# Patient Record
Sex: MALE | Race: WHITE | NOT HISPANIC OR LATINO
[De-identification: names, ages, dates, MRNs, and addresses within clinical notes are randomized per-mention and may not be internally consistent; named-entity substitution may affect disease eponyms.]

---

## 2020-02-28 ENCOUNTER — APPOINTMENT (OUTPATIENT)
Dept: NEUROLOGY | Facility: CLINIC | Age: 70
End: 2020-02-28
Payer: MEDICARE

## 2020-02-28 VITALS
SYSTOLIC BLOOD PRESSURE: 115 MMHG | DIASTOLIC BLOOD PRESSURE: 75 MMHG | BODY MASS INDEX: 35 KG/M2 | HEIGHT: 71 IN | HEART RATE: 81 BPM | WEIGHT: 250 LBS

## 2020-02-28 DIAGNOSIS — Z86.718 PERSONAL HISTORY OF OTHER VENOUS THROMBOSIS AND EMBOLISM: ICD-10-CM

## 2020-02-28 DIAGNOSIS — E11.9 TYPE 2 DIABETES MELLITUS W/OUT COMPLICATIONS: ICD-10-CM

## 2020-02-28 DIAGNOSIS — Z86.711 PERSONAL HISTORY OF PULMONARY EMBOLISM: ICD-10-CM

## 2020-02-28 DIAGNOSIS — E23.2 DIABETES INSIPIDUS: ICD-10-CM

## 2020-02-28 DIAGNOSIS — E03.9 HYPOTHYROIDISM, UNSPECIFIED: ICD-10-CM

## 2020-02-28 DIAGNOSIS — G47.30 SLEEP APNEA, UNSPECIFIED: ICD-10-CM

## 2020-02-28 DIAGNOSIS — Z78.9 OTHER SPECIFIED HEALTH STATUS: ICD-10-CM

## 2020-02-28 DIAGNOSIS — Z80.1 FAMILY HISTORY OF MALIGNANT NEOPLASM OF TRACHEA, BRONCHUS AND LUNG: ICD-10-CM

## 2020-02-28 DIAGNOSIS — E78.5 HYPERLIPIDEMIA, UNSPECIFIED: ICD-10-CM

## 2020-02-28 PROCEDURE — 99204 OFFICE O/P NEW MOD 45 MIN: CPT

## 2020-02-28 RX ORDER — CHLORHEXIDINE GLUCONATE 4 %
325 (65 FE) LIQUID (ML) TOPICAL
Refills: 0 | Status: ACTIVE | COMMUNITY

## 2020-02-28 RX ORDER — ASPIRIN 81 MG
81 TABLET, DELAYED RELEASE (ENTERIC COATED) ORAL
Refills: 0 | Status: ACTIVE | COMMUNITY

## 2020-02-28 RX ORDER — CHROMIUM 200 MCG
TABLET ORAL
Refills: 0 | Status: ACTIVE | COMMUNITY

## 2020-02-28 RX ORDER — TAMSULOSIN HYDROCHLORIDE 0.4 MG/1
0.4 CAPSULE ORAL
Refills: 0 | Status: ACTIVE | COMMUNITY

## 2020-02-28 RX ORDER — HYDROCORTISONE 5 MG/1
TABLET ORAL
Refills: 0 | Status: ACTIVE | COMMUNITY

## 2020-02-28 RX ORDER — DULAGLUTIDE 4.5 MG/.5ML
INJECTION, SOLUTION SUBCUTANEOUS
Refills: 0 | Status: ACTIVE | COMMUNITY

## 2020-02-28 RX ORDER — TESTOSTERONE 50 MG/5G
GEL TRANSDERMAL
Refills: 0 | Status: ACTIVE | COMMUNITY

## 2020-02-28 RX ORDER — ASCORBIC ACID 500 MG
TABLET ORAL
Refills: 0 | Status: ACTIVE | COMMUNITY

## 2020-02-28 RX ORDER — DESMOPRESSIN ACETATE 0.1 MG/ML
SPRAY NASAL
Refills: 0 | Status: ACTIVE | COMMUNITY

## 2020-02-28 RX ORDER — VENLAFAXINE HYDROCHLORIDE 150 MG/1
150 TABLET, EXTENDED RELEASE ORAL
Refills: 0 | Status: ACTIVE | COMMUNITY

## 2020-02-28 RX ORDER — B2/MAGNESIUM CIT,OXID/FEVERFEW 200-180-50
200-180-50 TABLET ORAL
Qty: 90 | Refills: 1 | Status: ACTIVE | COMMUNITY
Start: 2020-02-28 | End: 1900-01-01

## 2020-02-28 RX ORDER — RIVAROXABAN 20 MG/1
20 TABLET, FILM COATED ORAL
Refills: 0 | Status: ACTIVE | COMMUNITY

## 2020-02-28 RX ORDER — METFORMIN HYDROCHLORIDE 1000 MG/1
1000 TABLET, COATED ORAL
Refills: 0 | Status: ACTIVE | COMMUNITY

## 2020-02-28 RX ORDER — PANTOPRAZOLE 40 MG/1
40 TABLET, DELAYED RELEASE ORAL
Refills: 0 | Status: ACTIVE | COMMUNITY

## 2020-02-28 RX ORDER — ATORVASTATIN CALCIUM 40 MG/1
40 TABLET, FILM COATED ORAL
Refills: 0 | Status: ACTIVE | COMMUNITY

## 2020-02-28 NOTE — DATA REVIEWED
[de-identified] : 2/10/20: CT head: Status post right craniotomy with underlying due to of thickening without significant change from prior study, no evidence of acute intracranial hemorrhage

## 2020-02-28 NOTE — PHYSICAL EXAM
[General Appearance - Alert] : alert [General Appearance - In No Acute Distress] : in no acute distress [Oriented To Time, Place, And Person] : oriented to person, place, and time [Impaired Insight] : insight and judgment were intact [Affect] : the affect was normal [Person] : oriented to person [Place] : oriented to place [Time] : oriented to time [Concentration Intact] : normal concentrating ability [Visual Intact] : visual attention was ~T not ~L decreased [Repeating Phrases] : no difficulty repeating a phrase [Writing A Sentence] : no difficulty writing a sentence [Naming Objects] : no difficulty naming common objects [Fluency] : fluency intact [Comprehension] : comprehension intact [Past History] : adequate knowledge of personal past history [Reading] : reading intact [Cranial Nerves Optic (II)] : visual acuity intact bilaterally,  visual fields full to confrontation, pupils equal round and reactive to light [Cranial Nerves Oculomotor (III)] : extraocular motion intact [Cranial Nerves Trigeminal (V)] : facial sensation intact symmetrically [Cranial Nerves Facial (VII)] : face symmetrical [Cranial Nerves Vestibulocochlear (VIII)] : hearing was intact bilaterally [Cranial Nerves Hypoglossal (XII)] : there was no tongue deviation with protrusion [Cranial Nerves Accessory (XI - Cranial And Spinal)] : head turning and shoulder shrug symmetric [Cranial Nerves Glossopharyngeal (IX)] : tongue and palate midline [Motor Tone] : muscle tone was normal in all four extremities [Motor Strength] : muscle strength was normal in all four extremities [Proprioception] : proprioception was intact [No Muscle Atrophy] : normal bulk in all four extremities [Sensation Tactile Decrease] : light touch was intact [Vibration Decrease Leg / Foot Both Ankles] : decreased at both ankles [Vibration Decrease Leg / Foot Toes Both Feet] : decreased at the toes of both feet  [Abnormal Walk] : normal gait [Limited Balance] : the patient's balance was impaired [Tremor] : a tremor present [2+] : Patella left 2+ [1+] : Ankle jerk left 1+ [PERRL With Normal Accommodation] : pupils were equal in size, round, reactive to light, with normal accommodation [Extraocular Movements] : extraocular movements were intact [Full Visual Field] : full visual field [Neck Cervical Mass (___cm)] : no neck mass was observed [Auscultation Breath Sounds / Voice Sounds] : lungs were clear to auscultation bilaterally [Heart Rate And Rhythm] : heart rate was normal and rhythm regular [Heart Sounds] : normal S1 and S2 [No Spinal Tenderness] : no spinal tenderness [Musculoskeletal - Swelling] : no joint swelling seen [] : no rash [Short Term Intact] : short term memory impaired [Past-pointing] : there was no past-pointing [Dysdiadochokinesia Bilaterally] : not present [Coordination - Dysmetria Impaired Finger-to-Nose Bilateral] : not present [Plantar Reflex Right Only] : normal on the right [Plantar Reflex Left Only] : normal on the left [Papilledema Of Both Eyes] : no papilledema

## 2020-02-28 NOTE — HISTORY OF PRESENT ILLNESS
[FreeTextEntry1] : 69 year old RH male with PMHx of DM, HLD, hypothyroidism, DVT/PE, Craniopharyngioma. status-post craniotomy with resection in 2014, followed by recurrence with TS resection in 2015, follows up with neurosurgeon periodically at [facility] in [facility], has had number of hospital admission over 5 years for \par DI / metabolic syndrome; his last admission was in early Feb 2020 for PNA/Bronchitis. \par \par Patient started having headaches with visual blurring prior to diagnosis of Craniopharyngioma, after resection, he noted resolution of visual blurring but headaches persisted, he had had almost daily headaches since past 5 years, the headaches are frontal or temporal in location, usually left sided, lately right sided, moderate in intensity, occasionally severe, not associated with N, V, diplopia, tinnitus, vertigo, ataxia, paresthesias; occasionally associated with dizziness or visual blurring. Pt was evaluated and f/u with Dr. Padilla, the neurologist, has been on Topiramate 50 mg bid, he has not followed up with him in more than a year.

## 2020-02-28 NOTE — PHYSICAL EXAM
[General Appearance - Alert] : alert [General Appearance - In No Acute Distress] : in no acute distress [Oriented To Time, Place, And Person] : oriented to person, place, and time [Affect] : the affect was normal [Impaired Insight] : insight and judgment were intact [Person] : oriented to person [Place] : oriented to place [Time] : oriented to time [Concentration Intact] : normal concentrating ability [Visual Intact] : visual attention was ~T not ~L decreased [Writing A Sentence] : no difficulty writing a sentence [Naming Objects] : no difficulty naming common objects [Repeating Phrases] : no difficulty repeating a phrase [Comprehension] : comprehension intact [Fluency] : fluency intact [Reading] : reading intact [Past History] : adequate knowledge of personal past history [Cranial Nerves Optic (II)] : visual acuity intact bilaterally,  visual fields full to confrontation, pupils equal round and reactive to light [Cranial Nerves Oculomotor (III)] : extraocular motion intact [Cranial Nerves Trigeminal (V)] : facial sensation intact symmetrically [Cranial Nerves Vestibulocochlear (VIII)] : hearing was intact bilaterally [Cranial Nerves Facial (VII)] : face symmetrical [Cranial Nerves Hypoglossal (XII)] : there was no tongue deviation with protrusion [Cranial Nerves Glossopharyngeal (IX)] : tongue and palate midline [Cranial Nerves Accessory (XI - Cranial And Spinal)] : head turning and shoulder shrug symmetric [Motor Tone] : muscle tone was normal in all four extremities [Motor Strength] : muscle strength was normal in all four extremities [No Muscle Atrophy] : normal bulk in all four extremities [Proprioception] : proprioception was intact [Sensation Tactile Decrease] : light touch was intact [Vibration Decrease Leg / Foot Both Ankles] : decreased at both ankles [Vibration Decrease Leg / Foot Toes Both Feet] : decreased at the toes of both feet  [Abnormal Walk] : normal gait [Limited Balance] : the patient's balance was impaired [Tremor] : a tremor present [2+] : Patella left 2+ [1+] : Ankle jerk left 1+ [PERRL With Normal Accommodation] : pupils were equal in size, round, reactive to light, with normal accommodation [Extraocular Movements] : extraocular movements were intact [Full Visual Field] : full visual field [Neck Cervical Mass (___cm)] : no neck mass was observed [Auscultation Breath Sounds / Voice Sounds] : lungs were clear to auscultation bilaterally [Heart Rate And Rhythm] : heart rate was normal and rhythm regular [Heart Sounds] : normal S1 and S2 [No Spinal Tenderness] : no spinal tenderness [Musculoskeletal - Swelling] : no joint swelling seen [] : no rash [Past-pointing] : there was no past-pointing [Short Term Intact] : short term memory impaired [Dysdiadochokinesia Bilaterally] : not present [Coordination - Dysmetria Impaired Finger-to-Nose Bilateral] : not present [Plantar Reflex Right Only] : normal on the right [Plantar Reflex Left Only] : normal on the left [Papilledema Of Both Eyes] : no papilledema

## 2020-02-28 NOTE — DATA REVIEWED
[de-identified] : 2/10/20: CT head: Status post right craniotomy with underlying due to of thickening without significant change from prior study, no evidence of acute intracranial hemorrhage

## 2020-02-28 NOTE — HISTORY OF PRESENT ILLNESS
[FreeTextEntry1] : 69 year old RH male with PMHx of DM, HLD, hypothyroidism, DVT/PE, Craniopharyngioma. status-post craniotomy with resection in 2014, followed by recurrence with TS resection in 2015, follows up with neurosurgeon periodically at [facility] in [facility], has had number of hospital admission over 5 years for \par DI / metabolic syndrome; his last admission was in early Feb 2020 for PNA/Bronchitis. \par \par Patient started having headaches with visual blurring prior to diagnosis of Craniopharyngioma, after resection, he noted resolution of visual blurring but headaches persisted, he had had almost daily headaches since past 5 years, the headaches are frontal or temporal in location, usually left sided, lately right sided, moderate in intensity, occasionally severe, not associated with N, V, diplopia, tinnitus, vertigo, ataxia, paresthesias; occasionally associated with dizziness or visual blurring. Pt was evaluated and f/u with Dr. Padilla, the neurologist, has been on Topiramate 50 mg bid, he has not followed up with him in more than a year.

## 2020-02-28 NOTE — REVIEW OF SYSTEMS
[Feeling Tired] : feeling tired [Sleep Disturbances] : sleep disturbances [As Noted in HPI] : as noted in HPI [Memory Lapses or Loss] : memory loss [Decr. Concentrating Ability] : decreased concentrating ability [Poor Coordination] : poor coordination [Tension Headache] : tension-type headaches [Negative] : Heme/Lymph [FreeTextEntry7] : nausea

## 2020-02-28 NOTE — REASON FOR VISIT
[Consultation] : a consultation visit [Spouse] : spouse [FreeTextEntry1] : for evaluation of daily headaches

## 2020-02-28 NOTE — REVIEW OF SYSTEMS
[Feeling Tired] : feeling tired [As Noted in HPI] : as noted in HPI [Sleep Disturbances] : sleep disturbances [Memory Lapses or Loss] : memory loss [Decr. Concentrating Ability] : decreased concentrating ability [Poor Coordination] : poor coordination [Tension Headache] : tension-type headaches [Negative] : Heme/Lymph [FreeTextEntry7] : nausea

## 2020-02-28 NOTE — DISCUSSION/SUMMARY
[FreeTextEntry1] : 69 year old male with PMHx of DM, DI, HLD, DVT/PE, Craniopharyngioma. status-post crani with resection in 2014 and 2015; has had headaches with visual blurring prior to diagnosis of Craniopharyngioma, after resection, he noted resolution of visual blurring, the headaches however persisted, he has almost daily headaches since past 5 years, has been on Topiramate 50 mg bid.\par \par # Cephalgia; chronic daily Headaches, no signs of raised ICP\par \par - Have recommended continuing Topiramate 50 mg bid\par - Start Migrelief 1 capsule daily\par - Will consider changing prophylactic medication if no improvement with Topiramate in 10-12 weeks\par - F/U MRI brain results scheduled with NS

## 2020-05-22 ENCOUNTER — APPOINTMENT (OUTPATIENT)
Dept: NEUROLOGY | Facility: CLINIC | Age: 70
End: 2020-05-22
Payer: MEDICARE

## 2020-05-22 PROCEDURE — 99213 OFFICE O/P EST LOW 20 MIN: CPT | Mod: 95

## 2020-05-22 NOTE — HISTORY OF PRESENT ILLNESS
[Home] : at home, [unfilled] , at the time of the visit. [Medical Office: ([facility])___] : at the medical office located in  [Verbal consent obtained from patient] : the patient, [unfilled] [FreeTextEntry1] : Obtained consent for telehealth followup visit from the patient. Patient was last seen on 2/28/20.\par \par Patient reports he has noted remarkable improvement of daily headaches after starting Migrelief one capsule daily, in addition, he takes Topiramate 50 mg b.i.d. he reports in the last 2 months he has had only few headaches per month, these were mild. \par \par Patient denies visual blurring, worsening gait or balance, nausea, vomiting or sleep disturbunce. He is scheduled to followup with his neurosurgeon and followup MRI for craniopharyngioma in August 2020.\par \par Patient has no other complaints.

## 2020-05-22 NOTE — REVIEW OF SYSTEMS
[As Noted in HPI] : as noted in HPI [Memory Lapses or Loss] : memory loss [Decr. Concentrating Ability] : decreased concentrating ability [Poor Coordination] : poor coordination [Tension Headache] : tension-type headaches [Negative] : Heme/Lymph

## 2020-05-22 NOTE — DATA REVIEWED
[de-identified] : 2/10/20: CT head: Status post right craniotomy with underlying due to of thickening without significant change from prior study, no evidence of acute intracranial hemorrhage

## 2020-05-22 NOTE — DISCUSSION/SUMMARY
[FreeTextEntry1] : 70 year old male with PMHx of DM, DI, HLD, DVT/PE, Craniopharyngioma. status-post crani with resection in 2014 and 2015; has had headaches with visual blurring prior to diagnosis of Craniopharyngioma, after resection, he noted resolution of visual blurring, the headaches however persisted, he has almost daily headaches since past 5 years, has been on Topiramate 50 mg bid.\par \par # Cephalgia; chronic daily Headaches, remarkable improvement of HA from daily to few / month\par \par - continue Topiramate 50 mg bid\par - continue Migrelief 1 capsule daily\par - F/U with MRI brain results scheduled with NS in 8/2020

## 2020-05-22 NOTE — PHYSICAL EXAM
[General Appearance - Well-Appearing] : healthy appearing [General Appearance - Alert] : alert [Mood] : the mood was normal [Place] : oriented to place [Person] : oriented to person [Time] : oriented to time [Registration Intact] : recent registration memory intact [Repeating Phrases] : no difficulty repeating a phrase [Naming Objects] : no difficulty naming common objects [Fluency] : fluency intact [Comprehension] : comprehension intact [Cranial Nerves Optic (II)] : visual acuity intact bilaterally,  visual fields full to confrontation, pupils equal round and reactive to light [Cranial Nerves Oculomotor (III)] : extraocular motion intact [Cranial Nerves Hypoglossal (XII)] : there was no tongue deviation with protrusion [Cranial Nerves Facial (VII)] : face symmetrical [Short Term Intact] : short term memory impaired [FreeTextEntry6] : Limited virtual motor examination: no pronator drift, moves all 4 extremities antigravity, no finger-nose-finger ataxia [FreeTextEntry8] : Difficulty walking on heels and toes,

## 2020-11-27 ENCOUNTER — RX RENEWAL (OUTPATIENT)
Age: 70
End: 2020-11-27

## 2021-01-20 ENCOUNTER — INPATIENT (INPATIENT)
Facility: HOSPITAL | Age: 71
LOS: 3 days | Discharge: HOME HEALTH | DRG: 53 | End: 2021-01-24
Attending: FAMILY MEDICINE | Admitting: INTERNAL MEDICINE
Payer: COMMERCIAL

## 2021-01-20 VITALS
TEMPERATURE: 98 F | HEART RATE: 84 BPM | SYSTOLIC BLOOD PRESSURE: 131 MMHG | OXYGEN SATURATION: 99 % | WEIGHT: 268.08 LBS | RESPIRATION RATE: 16 BRPM | HEIGHT: 71 IN | DIASTOLIC BLOOD PRESSURE: 74 MMHG

## 2021-01-20 DIAGNOSIS — E23.2 DIABETES INSIPIDUS: ICD-10-CM

## 2021-01-20 DIAGNOSIS — E11.65 TYPE 2 DIABETES MELLITUS WITH HYPERGLYCEMIA: ICD-10-CM

## 2021-01-20 DIAGNOSIS — Z20.822 CONTACT WITH AND (SUSPECTED) EXPOSURE TO COVID-19: ICD-10-CM

## 2021-01-20 DIAGNOSIS — R55 SYNCOPE AND COLLAPSE: ICD-10-CM

## 2021-01-20 LAB
ADD ON TEST-SPECIMEN IN LAB: SIGNIFICANT CHANGE UP
ALBUMIN SERPL ELPH-MCNC: 3.3 G/DL — SIGNIFICANT CHANGE UP (ref 3.3–5)
ALP SERPL-CCNC: 99 U/L — SIGNIFICANT CHANGE UP (ref 40–120)
ALT FLD-CCNC: 76 U/L — SIGNIFICANT CHANGE UP (ref 12–78)
ANION GAP SERPL CALC-SCNC: 7 MMOL/L — SIGNIFICANT CHANGE UP (ref 5–17)
APPEARANCE UR: CLEAR — SIGNIFICANT CHANGE UP
APTT BLD: 41.4 SEC — HIGH (ref 27.5–35.5)
AST SERPL-CCNC: 29 U/L — SIGNIFICANT CHANGE UP (ref 15–37)
BASE EXCESS BLDV CALC-SCNC: -4.4 MMOL/L — LOW (ref -2–2)
BASOPHILS # BLD AUTO: 0.04 K/UL — SIGNIFICANT CHANGE UP (ref 0–0.2)
BASOPHILS NFR BLD AUTO: 0.7 % — SIGNIFICANT CHANGE UP (ref 0–2)
BILIRUB SERPL-MCNC: 0.4 MG/DL — SIGNIFICANT CHANGE UP (ref 0.2–1.2)
BILIRUB UR-MCNC: NEGATIVE — SIGNIFICANT CHANGE UP
BUN SERPL-MCNC: 19 MG/DL — SIGNIFICANT CHANGE UP (ref 7–23)
CALCIUM SERPL-MCNC: 8.7 MG/DL — SIGNIFICANT CHANGE UP (ref 8.5–10.1)
CHLORIDE SERPL-SCNC: 106 MMOL/L — SIGNIFICANT CHANGE UP (ref 96–108)
CO2 SERPL-SCNC: 24 MMOL/L — SIGNIFICANT CHANGE UP (ref 22–31)
COLOR SPEC: YELLOW — SIGNIFICANT CHANGE UP
CREAT SERPL-MCNC: 1.5 MG/DL — HIGH (ref 0.5–1.3)
DIFF PNL FLD: NEGATIVE — SIGNIFICANT CHANGE UP
EOSINOPHIL # BLD AUTO: 0.16 K/UL — SIGNIFICANT CHANGE UP (ref 0–0.5)
EOSINOPHIL NFR BLD AUTO: 2.9 % — SIGNIFICANT CHANGE UP (ref 0–6)
ETHANOL SERPL-MCNC: <10 MG/DL — SIGNIFICANT CHANGE UP (ref 0–10)
GLUCOSE SERPL-MCNC: 331 MG/DL — HIGH (ref 70–99)
GLUCOSE UR QL: 1000 MG/DL
HCO3 BLDV-SCNC: 23 MMOL/L — SIGNIFICANT CHANGE UP (ref 21–29)
HCT VFR BLD CALC: 42.2 % — SIGNIFICANT CHANGE UP (ref 39–50)
HGB BLD-MCNC: 14.4 G/DL — SIGNIFICANT CHANGE UP (ref 13–17)
IMM GRANULOCYTES NFR BLD AUTO: 1.5 % — SIGNIFICANT CHANGE UP (ref 0–1.5)
INR BLD: 1.04 RATIO — SIGNIFICANT CHANGE UP (ref 0.88–1.16)
KETONES UR-MCNC: ABNORMAL
LACTATE SERPL-SCNC: 3.5 MMOL/L — HIGH (ref 0.7–2)
LEUKOCYTE ESTERASE UR-ACNC: NEGATIVE — SIGNIFICANT CHANGE UP
LIDOCAIN IGE QN: 147 U/L — SIGNIFICANT CHANGE UP (ref 73–393)
LYMPHOCYTES # BLD AUTO: 1.49 K/UL — SIGNIFICANT CHANGE UP (ref 1–3.3)
LYMPHOCYTES # BLD AUTO: 27.1 % — SIGNIFICANT CHANGE UP (ref 13–44)
MCHC RBC-ENTMCNC: 33 PG — SIGNIFICANT CHANGE UP (ref 27–34)
MCHC RBC-ENTMCNC: 34.1 GM/DL — SIGNIFICANT CHANGE UP (ref 32–36)
MCV RBC AUTO: 96.6 FL — SIGNIFICANT CHANGE UP (ref 80–100)
MONOCYTES # BLD AUTO: 0.45 K/UL — SIGNIFICANT CHANGE UP (ref 0–0.9)
MONOCYTES NFR BLD AUTO: 8.2 % — SIGNIFICANT CHANGE UP (ref 2–14)
NEUTROPHILS # BLD AUTO: 3.28 K/UL — SIGNIFICANT CHANGE UP (ref 1.8–7.4)
NEUTROPHILS NFR BLD AUTO: 59.6 % — SIGNIFICANT CHANGE UP (ref 43–77)
NITRITE UR-MCNC: NEGATIVE — SIGNIFICANT CHANGE UP
PCO2 BLDV: 51 MMHG — HIGH (ref 35–50)
PH BLDV: 7.27 — LOW (ref 7.35–7.45)
PH UR: 6 — SIGNIFICANT CHANGE UP (ref 5–8)
PLATELET # BLD AUTO: 156 K/UL — SIGNIFICANT CHANGE UP (ref 150–400)
PO2 BLDV: 38 MMHG — SIGNIFICANT CHANGE UP (ref 25–45)
POTASSIUM SERPL-MCNC: 4.8 MMOL/L — SIGNIFICANT CHANGE UP (ref 3.5–5.3)
POTASSIUM SERPL-SCNC: 4.8 MMOL/L — SIGNIFICANT CHANGE UP (ref 3.5–5.3)
PROT SERPL-MCNC: 6.7 GM/DL — SIGNIFICANT CHANGE UP (ref 6–8.3)
PROT UR-MCNC: 15 MG/DL
PROTHROM AB SERPL-ACNC: 12 SEC — SIGNIFICANT CHANGE UP (ref 10.6–13.6)
RBC # BLD: 4.37 M/UL — SIGNIFICANT CHANGE UP (ref 4.2–5.8)
RBC # FLD: 13 % — SIGNIFICANT CHANGE UP (ref 10.3–14.5)
SAO2 % BLDV: 65 % — LOW (ref 67–88)
SARS-COV-2 RNA SPEC QL NAA+PROBE: SIGNIFICANT CHANGE UP
SODIUM SERPL-SCNC: 137 MMOL/L — SIGNIFICANT CHANGE UP (ref 135–145)
SP GR SPEC: 1.01 — SIGNIFICANT CHANGE UP (ref 1.01–1.02)
TROPONIN I SERPL-MCNC: <0.015 NG/ML — SIGNIFICANT CHANGE UP (ref 0.01–0.04)
TROPONIN I SERPL-MCNC: <0.015 NG/ML — SIGNIFICANT CHANGE UP (ref 0.01–0.04)
UROBILINOGEN FLD QL: NEGATIVE MG/DL — SIGNIFICANT CHANGE UP
WBC # BLD: 5.5 K/UL — SIGNIFICANT CHANGE UP (ref 3.8–10.5)
WBC # FLD AUTO: 5.5 K/UL — SIGNIFICANT CHANGE UP (ref 3.8–10.5)

## 2021-01-20 PROCEDURE — 70450 CT HEAD/BRAIN W/O DYE: CPT | Mod: 26,77

## 2021-01-20 PROCEDURE — 83605 ASSAY OF LACTIC ACID: CPT

## 2021-01-20 PROCEDURE — 86803 HEPATITIS C AB TEST: CPT

## 2021-01-20 PROCEDURE — 82962 GLUCOSE BLOOD TEST: CPT

## 2021-01-20 PROCEDURE — 97116 GAIT TRAINING THERAPY: CPT | Mod: GP

## 2021-01-20 PROCEDURE — 36415 COLL VENOUS BLD VENIPUNCTURE: CPT

## 2021-01-20 PROCEDURE — 84484 ASSAY OF TROPONIN QUANT: CPT

## 2021-01-20 PROCEDURE — 83036 HEMOGLOBIN GLYCOSYLATED A1C: CPT

## 2021-01-20 PROCEDURE — 70450 CT HEAD/BRAIN W/O DYE: CPT | Mod: 26

## 2021-01-20 PROCEDURE — 99242 OFF/OP CONSLTJ NEW/EST SF 20: CPT

## 2021-01-20 PROCEDURE — 97162 PT EVAL MOD COMPLEX 30 MIN: CPT | Mod: GP

## 2021-01-20 PROCEDURE — 93306 TTE W/DOPPLER COMPLETE: CPT

## 2021-01-20 PROCEDURE — 95700 EEG CONT REC W/VID EEG TECH: CPT

## 2021-01-20 PROCEDURE — 99223 1ST HOSP IP/OBS HIGH 75: CPT

## 2021-01-20 PROCEDURE — 73610 X-RAY EXAM OF ANKLE: CPT | Mod: 26,LT

## 2021-01-20 PROCEDURE — 80048 BASIC METABOLIC PNL TOTAL CA: CPT

## 2021-01-20 PROCEDURE — 93010 ELECTROCARDIOGRAM REPORT: CPT

## 2021-01-20 PROCEDURE — 84439 ASSAY OF FREE THYROXINE: CPT

## 2021-01-20 PROCEDURE — 97530 THERAPEUTIC ACTIVITIES: CPT | Mod: GP

## 2021-01-20 PROCEDURE — 76376 3D RENDER W/INTRP POSTPROCES: CPT | Mod: 26

## 2021-01-20 PROCEDURE — 86769 SARS-COV-2 COVID-19 ANTIBODY: CPT

## 2021-01-20 PROCEDURE — 71260 CT THORAX DX C+: CPT | Mod: 26

## 2021-01-20 PROCEDURE — 72125 CT NECK SPINE W/O DYE: CPT | Mod: 26

## 2021-01-20 PROCEDURE — 73562 X-RAY EXAM OF KNEE 3: CPT | Mod: 26,LT

## 2021-01-20 PROCEDURE — 74177 CT ABD & PELVIS W/CONTRAST: CPT | Mod: 26

## 2021-01-20 PROCEDURE — 85027 COMPLETE CBC AUTOMATED: CPT

## 2021-01-20 PROCEDURE — 70486 CT MAXILLOFACIAL W/O DYE: CPT | Mod: 26

## 2021-01-20 PROCEDURE — 84480 ASSAY TRIIODOTHYRONINE (T3): CPT

## 2021-01-20 PROCEDURE — 84443 ASSAY THYROID STIM HORMONE: CPT

## 2021-01-20 PROCEDURE — 73630 X-RAY EXAM OF FOOT: CPT | Mod: LT

## 2021-01-20 PROCEDURE — 95714 VEEG EA 12-26 HR UNMNTR: CPT

## 2021-01-20 PROCEDURE — 70450 CT HEAD/BRAIN W/O DYE: CPT

## 2021-01-20 RX ORDER — INSULIN GLARGINE 100 [IU]/ML
10 INJECTION, SOLUTION SUBCUTANEOUS AT BEDTIME
Refills: 0 | Status: DISCONTINUED | OUTPATIENT
Start: 2021-01-20 | End: 2021-01-23

## 2021-01-20 RX ORDER — METOCLOPRAMIDE HCL 10 MG
10 TABLET ORAL DAILY
Refills: 0 | Status: DISCONTINUED | OUTPATIENT
Start: 2021-01-20 | End: 2021-01-24

## 2021-01-20 RX ORDER — DEXTROSE 50 % IN WATER 50 %
15 SYRINGE (ML) INTRAVENOUS ONCE
Refills: 0 | Status: DISCONTINUED | OUTPATIENT
Start: 2021-01-20 | End: 2021-01-24

## 2021-01-20 RX ORDER — DESMOPRESSIN ACETATE 0.1 MG/1
1 TABLET ORAL
Qty: 0 | Refills: 0 | DISCHARGE

## 2021-01-20 RX ORDER — ATORVASTATIN CALCIUM 80 MG/1
40 TABLET, FILM COATED ORAL AT BEDTIME
Refills: 0 | Status: DISCONTINUED | OUTPATIENT
Start: 2021-01-20 | End: 2021-01-24

## 2021-01-20 RX ORDER — SODIUM CHLORIDE 9 MG/ML
1000 INJECTION, SOLUTION INTRAVENOUS
Refills: 0 | Status: DISCONTINUED | OUTPATIENT
Start: 2021-01-20 | End: 2021-01-24

## 2021-01-20 RX ORDER — DEXTROSE 50 % IN WATER 50 %
25 SYRINGE (ML) INTRAVENOUS ONCE
Refills: 0 | Status: DISCONTINUED | OUTPATIENT
Start: 2021-01-20 | End: 2021-01-24

## 2021-01-20 RX ORDER — TETANUS TOXOID, REDUCED DIPHTHERIA TOXOID AND ACELLULAR PERTUSSIS VACCINE, ADSORBED 5; 2.5; 8; 8; 2.5 [IU]/.5ML; [IU]/.5ML; UG/.5ML; UG/.5ML; UG/.5ML
0.5 SUSPENSION INTRAMUSCULAR ONCE
Refills: 0 | Status: COMPLETED | OUTPATIENT
Start: 2021-01-20 | End: 2021-01-20

## 2021-01-20 RX ORDER — SODIUM CHLORIDE 9 MG/ML
2000 INJECTION INTRAMUSCULAR; INTRAVENOUS; SUBCUTANEOUS ONCE
Refills: 0 | Status: COMPLETED | OUTPATIENT
Start: 2021-01-20 | End: 2021-01-20

## 2021-01-20 RX ORDER — DEXTROSE 50 % IN WATER 50 %
12.5 SYRINGE (ML) INTRAVENOUS ONCE
Refills: 0 | Status: DISCONTINUED | OUTPATIENT
Start: 2021-01-20 | End: 2021-01-24

## 2021-01-20 RX ORDER — RIVAROXABAN 15 MG-20MG
10 KIT ORAL
Refills: 0 | Status: DISCONTINUED | OUTPATIENT
Start: 2021-01-20 | End: 2021-01-24

## 2021-01-20 RX ORDER — DESMOPRESSIN ACETATE 0.1 MG/1
0.1 TABLET ORAL DAILY
Refills: 0 | Status: DISCONTINUED | OUTPATIENT
Start: 2021-01-20 | End: 2021-01-20

## 2021-01-20 RX ORDER — LEVOTHYROXINE SODIUM 125 MCG
137 TABLET ORAL DAILY
Refills: 0 | Status: DISCONTINUED | OUTPATIENT
Start: 2021-01-20 | End: 2021-01-24

## 2021-01-20 RX ORDER — TOPIRAMATE 25 MG
100 TABLET ORAL AT BEDTIME
Refills: 0 | Status: DISCONTINUED | OUTPATIENT
Start: 2021-01-20 | End: 2021-01-24

## 2021-01-20 RX ORDER — LEVETIRACETAM 250 MG/1
500 TABLET, FILM COATED ORAL AT BEDTIME
Refills: 0 | Status: DISCONTINUED | OUTPATIENT
Start: 2021-01-20 | End: 2021-01-24

## 2021-01-20 RX ORDER — ASPIRIN/CALCIUM CARB/MAGNESIUM 324 MG
81 TABLET ORAL DAILY
Refills: 0 | Status: DISCONTINUED | OUTPATIENT
Start: 2021-01-20 | End: 2021-01-24

## 2021-01-20 RX ORDER — HYDROCORTISONE 20 MG
5 TABLET ORAL AT BEDTIME
Refills: 0 | Status: DISCONTINUED | OUTPATIENT
Start: 2021-01-20 | End: 2021-01-24

## 2021-01-20 RX ORDER — ACETAMINOPHEN 500 MG
650 TABLET ORAL ONCE
Refills: 0 | Status: COMPLETED | OUTPATIENT
Start: 2021-01-20 | End: 2021-01-20

## 2021-01-20 RX ORDER — GLUCAGON INJECTION, SOLUTION 0.5 MG/.1ML
1 INJECTION, SOLUTION SUBCUTANEOUS ONCE
Refills: 0 | Status: DISCONTINUED | OUTPATIENT
Start: 2021-01-20 | End: 2021-01-24

## 2021-01-20 RX ORDER — INSULIN LISPRO 100/ML
VIAL (ML) SUBCUTANEOUS
Refills: 0 | Status: DISCONTINUED | OUTPATIENT
Start: 2021-01-20 | End: 2021-01-24

## 2021-01-20 RX ORDER — FLUORESCEIN SODIUM 9 MG
1 STRIP OPHTHALMIC (EYE) ONCE
Refills: 0 | Status: COMPLETED | OUTPATIENT
Start: 2021-01-20 | End: 2021-01-20

## 2021-01-20 RX ORDER — HYDROCORTISONE 20 MG
15 TABLET ORAL DAILY
Refills: 0 | Status: DISCONTINUED | OUTPATIENT
Start: 2021-01-20 | End: 2021-01-24

## 2021-01-20 RX ORDER — DESMOPRESSIN ACETATE 0.1 MG/1
0.1 TABLET ORAL AT BEDTIME
Refills: 0 | Status: DISCONTINUED | OUTPATIENT
Start: 2021-01-20 | End: 2021-01-24

## 2021-01-20 RX ORDER — TOPIRAMATE 25 MG
50 TABLET ORAL DAILY
Refills: 0 | Status: DISCONTINUED | OUTPATIENT
Start: 2021-01-20 | End: 2021-01-24

## 2021-01-20 RX ORDER — VENLAFAXINE HCL 75 MG
150 CAPSULE, EXT RELEASE 24 HR ORAL DAILY
Refills: 0 | Status: DISCONTINUED | OUTPATIENT
Start: 2021-01-20 | End: 2021-01-24

## 2021-01-20 RX ORDER — TAMSULOSIN HYDROCHLORIDE 0.4 MG/1
0.8 CAPSULE ORAL AT BEDTIME
Refills: 0 | Status: DISCONTINUED | OUTPATIENT
Start: 2021-01-20 | End: 2021-01-24

## 2021-01-20 RX ADMIN — Medication 100 MILLIGRAM(S): at 22:10

## 2021-01-20 RX ADMIN — Medication 1: at 19:10

## 2021-01-20 RX ADMIN — SODIUM CHLORIDE 2000 MILLILITER(S): 9 INJECTION INTRAMUSCULAR; INTRAVENOUS; SUBCUTANEOUS at 15:00

## 2021-01-20 RX ADMIN — TETANUS TOXOID, REDUCED DIPHTHERIA TOXOID AND ACELLULAR PERTUSSIS VACCINE, ADSORBED 0.5 MILLILITER(S): 5; 2.5; 8; 8; 2.5 SUSPENSION INTRAMUSCULAR at 15:25

## 2021-01-20 RX ADMIN — INSULIN GLARGINE 10 UNIT(S): 100 INJECTION, SOLUTION SUBCUTANEOUS at 22:46

## 2021-01-20 RX ADMIN — ATORVASTATIN CALCIUM 40 MILLIGRAM(S): 80 TABLET, FILM COATED ORAL at 22:05

## 2021-01-20 RX ADMIN — DESMOPRESSIN ACETATE 0.1 MILLIGRAM(S): 0.1 TABLET ORAL at 22:06

## 2021-01-20 RX ADMIN — Medication 1 DROP(S): at 15:56

## 2021-01-20 RX ADMIN — Medication 650 MILLIGRAM(S): at 15:25

## 2021-01-20 RX ADMIN — Medication 5 MILLIGRAM(S): at 22:05

## 2021-01-20 RX ADMIN — Medication 1 APPLICATION(S): at 15:56

## 2021-01-20 RX ADMIN — LEVETIRACETAM 500 MILLIGRAM(S): 250 TABLET, FILM COATED ORAL at 22:06

## 2021-01-20 RX ADMIN — TAMSULOSIN HYDROCHLORIDE 0.8 MILLIGRAM(S): 0.4 CAPSULE ORAL at 22:05
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
## 2021-01-20 NOTE — H&P ADULT - NSHPPHYSICALEXAM_GEN_ALL_CORE
· CONSTITUTIONAL: Well appearing, awake, alert, oriented to person, place, time/situation and in no apparent distress.  · ENMT: Airway patent, Nasal mucosa clear. Mouth with normal mucosa. Throat has no vesicles, no oropharyngeal exudates and uvula is midline.  · EYES: Clear bilaterally, pupils equal, round and reactive to light.  · CARDIAC: Normal rate, regular rhythm.  Heart sounds S1, S2.  No murmurs, rubs or gallops.  · RESPIRATORY: Breath sounds clear and equal bilaterally.  · GASTROINTESTINAL: Abdomen soft, non-tender, no guarding.  · MUSCULOSKELETAL: Spine appears normal, range of motion is not limited, no muscle or joint tenderness  · NEUROLOGICAL: Alert and oriented, no focal deficits, no motor or sensory deficits.  · SKIN: +L cheek laceration

## 2021-01-20 NOTE — H&P ADULT - NSHPLABSRESULTS_GEN_ALL_CORE
14.4   5.50  )-----------( 156      ( 20 Jan 2021 14:08 )             42.2   01-20    137  |  106  |  19  ----------------------------<  331<H>  4.8   |  24  |  1.50<H>    Ca    8.7      20 Jan 2021 14:08    TPro  6.7  /  Alb  3.3  /  TBili  0.4  /  DBili  x   /  AST  29  /  ALT  76  /  AlkPhos  99  01-20 14.4   5.50  )-----------( 156      ( 20 Jan 2021 14:08 )             42.2   01-20    137  |  106  |  19  ----------------------------<  331<H>  4.8   |  24  |  1.50<H>    Ca    8.7      20 Jan 2021 14:08    TPro  6.7  /  Alb  3.3  /  TBili  0.4  /  DBili  x   /  AST  29  /  ALT  76  /  AlkPhos  99  01-20    CTB:  There is no evidence of skull fracture, mass effect or acute lobar infarction.  Hyperdensity subjacent to the right frontoparietal and temporal craniotomy, may represent dural thickening however a small subdural hematoma cannot be entirely excluded. Also note that CT maxillofacial was obtained after intravenous contrast administration for CT of the chest and abdomen/pelvis and the hyperdensity appears to be uniformly enhancing, felt to be dural thickening rather than hematoma. However, attention on follow-up head CT is recommended.    There is no facial bone fracture. Foci of air and soft tissue stranding in the left premalar/ buccal region may represent the site of injury. Hyperdense focus deep in the buccal soft tissues may represent foreign object. Direct inspection is recommended.
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
## 2021-01-20 NOTE — ED PROVIDER NOTE - PMH
Brain tumor    Diabetes insipidus    DM (diabetes mellitus)    DVT (deep venous thrombosis)    HLD (hyperlipidemia)    Hypothyroid    Intractable tension-type headache    THOAMS (obstructive sleep apnea)    PE (pulmonary thromboembolism)

## 2021-01-21 LAB
A1C WITH ESTIMATED AVERAGE GLUCOSE RESULT: 8.5 % — HIGH (ref 4–5.6)
ANION GAP SERPL CALC-SCNC: 4 MMOL/L — LOW (ref 5–17)
BUN SERPL-MCNC: 19 MG/DL — SIGNIFICANT CHANGE UP (ref 7–23)
CALCIUM SERPL-MCNC: 8.7 MG/DL — SIGNIFICANT CHANGE UP (ref 8.5–10.1)
CHLORIDE SERPL-SCNC: 111 MMOL/L — HIGH (ref 96–108)
CO2 SERPL-SCNC: 26 MMOL/L — SIGNIFICANT CHANGE UP (ref 22–31)
CREAT SERPL-MCNC: 1.33 MG/DL — HIGH (ref 0.5–1.3)
ESTIMATED AVERAGE GLUCOSE: 197 MG/DL — HIGH (ref 68–114)
GLUCOSE SERPL-MCNC: 142 MG/DL — HIGH (ref 70–99)
HCT VFR BLD CALC: 41.4 % — SIGNIFICANT CHANGE UP (ref 39–50)
HCV AB S/CO SERPL IA: 0.06 S/CO — SIGNIFICANT CHANGE UP (ref 0–0.99)
HCV AB SERPL-IMP: SIGNIFICANT CHANGE UP
HGB BLD-MCNC: 13.8 G/DL — SIGNIFICANT CHANGE UP (ref 13–17)
MCHC RBC-ENTMCNC: 32.3 PG — SIGNIFICANT CHANGE UP (ref 27–34)
MCHC RBC-ENTMCNC: 33.3 GM/DL — SIGNIFICANT CHANGE UP (ref 32–36)
MCV RBC AUTO: 97 FL — SIGNIFICANT CHANGE UP (ref 80–100)
PLATELET # BLD AUTO: 133 K/UL — LOW (ref 150–400)
POTASSIUM SERPL-MCNC: 4.1 MMOL/L — SIGNIFICANT CHANGE UP (ref 3.5–5.3)
POTASSIUM SERPL-SCNC: 4.1 MMOL/L — SIGNIFICANT CHANGE UP (ref 3.5–5.3)
RBC # BLD: 4.27 M/UL — SIGNIFICANT CHANGE UP (ref 4.2–5.8)
RBC # FLD: 13 % — SIGNIFICANT CHANGE UP (ref 10.3–14.5)
SARS-COV-2 IGG SERPL QL IA: NEGATIVE — SIGNIFICANT CHANGE UP
SARS-COV-2 IGM SERPL IA-ACNC: 0.08 INDEX — SIGNIFICANT CHANGE UP
SODIUM SERPL-SCNC: 141 MMOL/L — SIGNIFICANT CHANGE UP (ref 135–145)
TROPONIN I SERPL-MCNC: <0.015 NG/ML — SIGNIFICANT CHANGE UP (ref 0.01–0.04)
WBC # BLD: 5.98 K/UL — SIGNIFICANT CHANGE UP (ref 3.8–10.5)
WBC # FLD AUTO: 5.98 K/UL — SIGNIFICANT CHANGE UP (ref 3.8–10.5)

## 2021-01-21 PROCEDURE — 73630 X-RAY EXAM OF FOOT: CPT | Mod: 26,LT

## 2021-01-21 PROCEDURE — 99223 1ST HOSP IP/OBS HIGH 75: CPT

## 2021-01-21 PROCEDURE — 99233 SBSQ HOSP IP/OBS HIGH 50: CPT

## 2021-01-21 PROCEDURE — 99231 SBSQ HOSP IP/OBS SF/LOW 25: CPT

## 2021-01-21 RX ORDER — INFLUENZA VIRUS VACCINE 15; 15; 15; 15 UG/.5ML; UG/.5ML; UG/.5ML; UG/.5ML
0.5 SUSPENSION INTRAMUSCULAR ONCE
Refills: 0 | Status: DISCONTINUED | OUTPATIENT
Start: 2021-01-21 | End: 2021-01-24

## 2021-01-21 RX ORDER — ACETAMINOPHEN 500 MG
650 TABLET ORAL EVERY 6 HOURS
Refills: 0 | Status: DISCONTINUED | OUTPATIENT
Start: 2021-01-21 | End: 2021-01-24

## 2021-01-21 RX ADMIN — Medication 10 MILLIGRAM(S): at 11:05

## 2021-01-21 RX ADMIN — Medication 150 MILLIGRAM(S): at 11:05

## 2021-01-21 RX ADMIN — Medication 5 MILLIGRAM(S): at 21:16

## 2021-01-21 RX ADMIN — LEVETIRACETAM 500 MILLIGRAM(S): 250 TABLET, FILM COATED ORAL at 21:15

## 2021-01-21 RX ADMIN — Medication 137 MICROGRAM(S): at 04:34

## 2021-01-21 RX ADMIN — DESMOPRESSIN ACETATE 0.1 MILLIGRAM(S): 0.1 TABLET ORAL at 21:16

## 2021-01-21 RX ADMIN — Medication 1: at 13:48

## 2021-01-21 RX ADMIN — INSULIN GLARGINE 10 UNIT(S): 100 INJECTION, SOLUTION SUBCUTANEOUS at 21:28

## 2021-01-21 RX ADMIN — Medication 3: at 18:23

## 2021-01-21 RX ADMIN — Medication 650 MILLIGRAM(S): at 04:34

## 2021-01-21 RX ADMIN — ATORVASTATIN CALCIUM 40 MILLIGRAM(S): 80 TABLET, FILM COATED ORAL at 21:15

## 2021-01-21 RX ADMIN — Medication 100 MILLIGRAM(S): at 21:16

## 2021-01-21 RX ADMIN — RIVAROXABAN 10 MILLIGRAM(S): KIT at 18:25

## 2021-01-21 RX ADMIN — Medication 650 MILLIGRAM(S): at 18:25

## 2021-01-21 RX ADMIN — TAMSULOSIN HYDROCHLORIDE 0.8 MILLIGRAM(S): 0.4 CAPSULE ORAL at 21:16

## 2021-01-21 RX ADMIN — Medication 15 MILLIGRAM(S): at 11:04

## 2021-01-21 RX ADMIN — Medication 81 MILLIGRAM(S): at 11:05

## 2021-01-21 RX ADMIN — Medication 50 MILLIGRAM(S): at 11:04
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
## 2021-01-21 NOTE — PHYSICAL THERAPY INITIAL EVALUATION ADULT - PHYSICAL ASSIST/NONPHYSICAL ASSIST: SIT/SUPINE, REHAB EVAL
Controlled.   Continue Dialudid PCA 0.5 mg/h, 0.5 mg q 15' DD, and 1 mg q 1 CB  Holistic Nurse referral done verbal cues/1 person assist
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
## 2021-01-21 NOTE — PHYSICAL THERAPY INITIAL EVALUATION ADULT - WEIGHT-BEARING RESTRICTIONS: SIT/STAND, REHAB EVAL
Left foot pain on wt bearing so pt instructed to offload wt on left forefoot/weight-bearing as tolerated
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
## 2021-01-21 NOTE — PHYSICAL THERAPY INITIAL EVALUATION ADULT - GENERAL OBSERVATIONS, REHAB EVAL
Pt was found lying in bed with tele on, Pt on COVID precs as PUI, Pt c/o left forefoot pain, pt is willing to participate in PT
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
## 2021-01-22 LAB
ANION GAP SERPL CALC-SCNC: 4 MMOL/L — LOW (ref 5–17)
BUN SERPL-MCNC: 19 MG/DL — SIGNIFICANT CHANGE UP (ref 7–23)
CALCIUM SERPL-MCNC: 8.2 MG/DL — LOW (ref 8.5–10.1)
CHLORIDE SERPL-SCNC: 108 MMOL/L — SIGNIFICANT CHANGE UP (ref 96–108)
CO2 SERPL-SCNC: 25 MMOL/L — SIGNIFICANT CHANGE UP (ref 22–31)
CREAT SERPL-MCNC: 1.4 MG/DL — HIGH (ref 0.5–1.3)
GLUCOSE SERPL-MCNC: 281 MG/DL — HIGH (ref 70–99)
HCT VFR BLD CALC: 39.7 % — SIGNIFICANT CHANGE UP (ref 39–50)
HGB BLD-MCNC: 13.7 G/DL — SIGNIFICANT CHANGE UP (ref 13–17)
MCHC RBC-ENTMCNC: 32.5 PG — SIGNIFICANT CHANGE UP (ref 27–34)
MCHC RBC-ENTMCNC: 34.5 GM/DL — SIGNIFICANT CHANGE UP (ref 32–36)
MCV RBC AUTO: 94.3 FL — SIGNIFICANT CHANGE UP (ref 80–100)
PLATELET # BLD AUTO: 143 K/UL — LOW (ref 150–400)
POTASSIUM SERPL-MCNC: 3.6 MMOL/L — SIGNIFICANT CHANGE UP (ref 3.5–5.3)
POTASSIUM SERPL-SCNC: 3.6 MMOL/L — SIGNIFICANT CHANGE UP (ref 3.5–5.3)
RBC # BLD: 4.21 M/UL — SIGNIFICANT CHANGE UP (ref 4.2–5.8)
RBC # FLD: 12.9 % — SIGNIFICANT CHANGE UP (ref 10.3–14.5)
SODIUM SERPL-SCNC: 137 MMOL/L — SIGNIFICANT CHANGE UP (ref 135–145)
TSH SERPL-MCNC: <0.01 UU/ML — LOW (ref 0.34–4.82)
WBC # BLD: 6.08 K/UL — SIGNIFICANT CHANGE UP (ref 3.8–10.5)
WBC # FLD AUTO: 6.08 K/UL — SIGNIFICANT CHANGE UP (ref 3.8–10.5)

## 2021-01-22 PROCEDURE — 99232 SBSQ HOSP IP/OBS MODERATE 35: CPT

## 2021-01-22 PROCEDURE — 95720 EEG PHY/QHP EA INCR W/VEEG: CPT

## 2021-01-22 RX ORDER — INSULIN LISPRO 100/ML
VIAL (ML) SUBCUTANEOUS AT BEDTIME
Refills: 0 | Status: DISCONTINUED | OUTPATIENT
Start: 2021-01-22 | End: 2021-01-24

## 2021-01-22 RX ADMIN — ATORVASTATIN CALCIUM 40 MILLIGRAM(S): 80 TABLET, FILM COATED ORAL at 20:30

## 2021-01-22 RX ADMIN — INSULIN GLARGINE 10 UNIT(S): 100 INJECTION, SOLUTION SUBCUTANEOUS at 20:31

## 2021-01-22 RX ADMIN — Medication 4: at 20:39

## 2021-01-22 RX ADMIN — Medication 3: at 17:09

## 2021-01-22 RX ADMIN — Medication 10 MILLIGRAM(S): at 08:32

## 2021-01-22 RX ADMIN — Medication 2: at 08:30

## 2021-01-22 RX ADMIN — Medication 5 MILLIGRAM(S): at 20:30

## 2021-01-22 RX ADMIN — LEVETIRACETAM 500 MILLIGRAM(S): 250 TABLET, FILM COATED ORAL at 20:31

## 2021-01-22 RX ADMIN — Medication 50 MILLIGRAM(S): at 08:32

## 2021-01-22 RX ADMIN — Medication 15 MILLIGRAM(S): at 08:31

## 2021-01-22 RX ADMIN — Medication 650 MILLIGRAM(S): at 20:32

## 2021-01-22 RX ADMIN — Medication 3: at 12:20

## 2021-01-22 RX ADMIN — Medication 100 MILLIGRAM(S): at 20:32

## 2021-01-22 RX ADMIN — RIVAROXABAN 10 MILLIGRAM(S): KIT at 17:05

## 2021-01-22 RX ADMIN — TAMSULOSIN HYDROCHLORIDE 0.8 MILLIGRAM(S): 0.4 CAPSULE ORAL at 20:32

## 2021-01-22 RX ADMIN — Medication 150 MILLIGRAM(S): at 08:32

## 2021-01-22 RX ADMIN — Medication 81 MILLIGRAM(S): at 08:31

## 2021-01-22 RX ADMIN — DESMOPRESSIN ACETATE 0.1 MILLIGRAM(S): 0.1 TABLET ORAL at 20:30

## 2021-01-22 RX ADMIN — Medication 137 MICROGRAM(S): at 05:12

## 2021-01-22 NOTE — PROVIDER CONTACT NOTE (OTHER) - SITUATION
Consult left with Dr. Peñaloza's answering service.
Spoke with Rosa
LEFT MESSAGE WITH HITESH FROM DR. CORBIN'S ANSWERING SERVICE.
Office made aware of consult.
Spoke with Jennifer
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
## 2021-01-23 DIAGNOSIS — Z98.890 OTHER SPECIFIED POSTPROCEDURAL STATES: Chronic | ICD-10-CM

## 2021-01-23 DIAGNOSIS — Z95.828 PRESENCE OF OTHER VASCULAR IMPLANTS AND GRAFTS: Chronic | ICD-10-CM

## 2021-01-23 LAB
ANION GAP SERPL CALC-SCNC: 5 MMOL/L — SIGNIFICANT CHANGE UP (ref 5–17)
BUN SERPL-MCNC: 17 MG/DL — SIGNIFICANT CHANGE UP (ref 7–23)
CALCIUM SERPL-MCNC: 8.4 MG/DL — LOW (ref 8.5–10.1)
CHLORIDE SERPL-SCNC: 108 MMOL/L — SIGNIFICANT CHANGE UP (ref 96–108)
CO2 SERPL-SCNC: 25 MMOL/L — SIGNIFICANT CHANGE UP (ref 22–31)
CREAT SERPL-MCNC: 1.33 MG/DL — HIGH (ref 0.5–1.3)
GLUCOSE SERPL-MCNC: 259 MG/DL — HIGH (ref 70–99)
HCT VFR BLD CALC: 39.3 % — SIGNIFICANT CHANGE UP (ref 39–50)
HGB BLD-MCNC: 13.7 G/DL — SIGNIFICANT CHANGE UP (ref 13–17)
MCHC RBC-ENTMCNC: 32.5 PG — SIGNIFICANT CHANGE UP (ref 27–34)
MCHC RBC-ENTMCNC: 34.9 GM/DL — SIGNIFICANT CHANGE UP (ref 32–36)
MCV RBC AUTO: 93.3 FL — SIGNIFICANT CHANGE UP (ref 80–100)
PLATELET # BLD AUTO: 135 K/UL — LOW (ref 150–400)
POTASSIUM SERPL-MCNC: 4.2 MMOL/L — SIGNIFICANT CHANGE UP (ref 3.5–5.3)
POTASSIUM SERPL-SCNC: 4.2 MMOL/L — SIGNIFICANT CHANGE UP (ref 3.5–5.3)
RBC # BLD: 4.21 M/UL — SIGNIFICANT CHANGE UP (ref 4.2–5.8)
RBC # FLD: 12.9 % — SIGNIFICANT CHANGE UP (ref 10.3–14.5)
SODIUM SERPL-SCNC: 138 MMOL/L — SIGNIFICANT CHANGE UP (ref 135–145)
T3 SERPL-MCNC: 81 NG/DL — SIGNIFICANT CHANGE UP (ref 80–200)
T4 FREE SERPL-MCNC: 1.1 NG/DL — SIGNIFICANT CHANGE UP (ref 0.76–1.46)
WBC # BLD: 5.19 K/UL — SIGNIFICANT CHANGE UP (ref 3.8–10.5)
WBC # FLD AUTO: 5.19 K/UL — SIGNIFICANT CHANGE UP (ref 3.8–10.5)

## 2021-01-23 PROCEDURE — 99233 SBSQ HOSP IP/OBS HIGH 50: CPT

## 2021-01-23 PROCEDURE — 99232 SBSQ HOSP IP/OBS MODERATE 35: CPT

## 2021-01-23 PROCEDURE — 93306 TTE W/DOPPLER COMPLETE: CPT | Mod: 26

## 2021-01-23 RX ORDER — INSULIN GLARGINE 100 [IU]/ML
14 INJECTION, SOLUTION SUBCUTANEOUS AT BEDTIME
Refills: 0 | Status: DISCONTINUED | OUTPATIENT
Start: 2021-01-23 | End: 2021-01-24

## 2021-01-23 RX ADMIN — Medication 150 MILLIGRAM(S): at 09:22

## 2021-01-23 RX ADMIN — Medication 81 MILLIGRAM(S): at 09:22

## 2021-01-23 RX ADMIN — INSULIN GLARGINE 14 UNIT(S): 100 INJECTION, SOLUTION SUBCUTANEOUS at 20:38

## 2021-01-23 RX ADMIN — Medication 5 MILLIGRAM(S): at 20:38

## 2021-01-23 RX ADMIN — Medication 50 MILLIGRAM(S): at 09:22

## 2021-01-23 RX ADMIN — Medication 3: at 18:15

## 2021-01-23 RX ADMIN — DESMOPRESSIN ACETATE 0.1 MILLIGRAM(S): 0.1 TABLET ORAL at 20:37

## 2021-01-23 RX ADMIN — ATORVASTATIN CALCIUM 40 MILLIGRAM(S): 80 TABLET, FILM COATED ORAL at 20:37

## 2021-01-23 RX ADMIN — Medication 1: at 09:26

## 2021-01-23 RX ADMIN — Medication 3: at 14:17

## 2021-01-23 RX ADMIN — RIVAROXABAN 10 MILLIGRAM(S): KIT at 18:15

## 2021-01-23 RX ADMIN — Medication 650 MILLIGRAM(S): at 05:42

## 2021-01-23 RX ADMIN — Medication 2: at 20:39

## 2021-01-23 RX ADMIN — Medication 100 MILLIGRAM(S): at 20:38

## 2021-01-23 RX ADMIN — LEVETIRACETAM 500 MILLIGRAM(S): 250 TABLET, FILM COATED ORAL at 20:39

## 2021-01-23 RX ADMIN — Medication 15 MILLIGRAM(S): at 09:24

## 2021-01-23 RX ADMIN — Medication 137 MICROGRAM(S): at 05:36

## 2021-01-23 RX ADMIN — TAMSULOSIN HYDROCHLORIDE 0.8 MILLIGRAM(S): 0.4 CAPSULE ORAL at 20:38
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
## 2021-01-23 NOTE — PROGRESS NOTE ADULT - ASSESSMENT
70 Y old male with syncope, possible seizure, S/P repair of left facial laceration no internal  FB appreciated on exam, laceration repair ed by ED , no external FB felt after washout, will remove stitches  in 1 week. FB may be deep. CTH stable. Left foot and ankle pain left 5th metatarsal fracture. No FB  felt on bimanual exa, will reassess in office in 1 week.     pain control  S/P splinting by podiatry [facility] LLE.  IS  F/U EEG  DVt /GI prophylaxis  PT   Neurology following abnormal EEG  Stitches removal in 1 week, will follow in office.  Stable from trauma stand point.  Medical care per medical service.
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
## 2021-01-23 NOTE — PROGRESS NOTE ADULT - SUBJECTIVE AND OBJECTIVE BOX
Interval History:  1/23/20: Patient has no new complaints today.    MEDICATIONS  (STANDING):  aspirin enteric coated 81 milliGRAM(s) Oral daily  atorvastatin 40 milliGRAM(s) Oral at bedtime  desmopressin 0.1 milliGRAM(s) Oral at bedtime  dextrose 40% Gel 15 Gram(s) Oral once  dextrose 5%. 1000 milliLiter(s) (50 mL/Hr) IV Continuous <Continuous>  dextrose 5%. 1000 milliLiter(s) (100 mL/Hr) IV Continuous <Continuous>  dextrose 50% Injectable 25 Gram(s) IV Push once  dextrose 50% Injectable 12.5 Gram(s) IV Push once  dextrose 50% Injectable 25 Gram(s) IV Push once  glucagon  Injectable 1 milliGRAM(s) IntraMuscular once  hydrocortisone 15 milliGRAM(s) Oral daily  hydrocortisone 5 milliGRAM(s) Oral at bedtime  influenza   Vaccine 0.5 milliLiter(s) IntraMuscular once  insulin glargine Injectable (LANTUS) 10 Unit(s) SubCutaneous at bedtime  insulin lispro (ADMELOG) corrective regimen sliding scale   SubCutaneous at bedtime  insulin lispro (ADMELOG) corrective regimen sliding scale   SubCutaneous three times a day before meals  levETIRAcetam  milliGRAM(s) Oral at bedtime  levothyroxine 137 MICROGram(s) Oral daily  metoclopramide 10 milliGRAM(s) Oral daily  rivaroxaban 10 milliGRAM(s) Oral with dinner  tamsulosin 0.8 milliGRAM(s) Oral at bedtime  topiramate 50 milliGRAM(s) Oral daily  topiramate 100 milliGRAM(s) Oral at bedtime  venlafaxine XR. 150 milliGRAM(s) Oral daily    MEDICATIONS  (PRN):  acetaminophen   Tablet .. 650 milliGRAM(s) Oral every 6 hours PRN Mild Pain (1 - 3), Moderate Pain (4 - 6)      Allergies    No Known Allergies    Intolerances        PHYSICAL EXAM:  Vital Signs Last 24 Hrs  T(F): 97.6 (01-23-21 @ 08:49)  HR: 77 (01-23-21 @ 08:49)  BP: 118/56 (01-23-21 @ 08:49)  RR: 17 (01-23-21 @ 08:49)    GENERAL: NAD, well-groomed, well-developed  HEAD:  Atraumatic, Normocephalic  Neuro:  Awake, alert, no aphasia  CN: PERRL, EOMI, no nystagmus, no facial weakness, tongue protrudes in the midline  motor: normal tone, no pronator drift, full strength in upper extremities and RLE, left leg in cast, able to lift against gravity.  sensory: intact to light touch  coordination: finger to nose intact bilaterally  DTRs: symmetric, plantar responses flexor bilaterally    LABS:                        13.7   6.08  )-----------( 143      ( 22 Jan 2021 12:04 )             39.7     01-22    137  |  108  |  19  ----------------------------<  281<H>  3.6   |  25  |  1.40<H>    Ca    8.2<L>      22 Jan 2021 12:04            RADIOLOGY & ADDITIONAL STUDIES:    CT head/CT maxillofacial/CT cervical spine 1/20/21 at 14:40    There is no evidence of skull fracture, mass effect or acute lobar infarction.    Hyperdensity subjacent to the right frontoparietal and temporal craniotomy, may represent dural thickening however a small subdural hematoma cannot be entirely excluded. Also note that CT maxillofacial was obtained after intravenous contrast administration for CT of the chest and abdomen/pelvis and the hyperdensity appears to be uniformly enhancing, felt to be dural thickening rather than hematoma. However, attention on follow-up head CT is recommended.    There is no facial bone fracture. Foci of air and soft tissue stranding in the left premalar/buccal region may represent the site of injury. Hyperdense focus deep in the buccal soft tissues may represent foreign object. Direct inspection is recommended.    There is no cervical spine fracture.    CT head no contrast 1/20/21 at 18:45  1.  Right pterional craniotomy. Stable subjacent extra-axial hyperdensity measuring 4 mm suggesting dural thickening versus subdural hematoma.  2.  Redemonstration of left facial soft tissue swelling and hematoma with a 3 mm linear radiopaque object suggesting a foreign body.    24 hour EEG 1/21/21-1/22/21:  -Mild generalized slowing  -Right parietal and temporal focal slowing
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
## 2021-01-24 ENCOUNTER — TRANSCRIPTION ENCOUNTER (OUTPATIENT)
Age: 71
End: 2021-01-24

## 2021-01-24 VITALS
HEART RATE: 92 BPM | SYSTOLIC BLOOD PRESSURE: 120 MMHG | OXYGEN SATURATION: 92 % | DIASTOLIC BLOOD PRESSURE: 78 MMHG | TEMPERATURE: 98 F

## 2021-01-24 LAB
ANION GAP SERPL CALC-SCNC: 4 MMOL/L — LOW (ref 5–17)
BUN SERPL-MCNC: 17 MG/DL — SIGNIFICANT CHANGE UP (ref 7–23)
CALCIUM SERPL-MCNC: 9 MG/DL — SIGNIFICANT CHANGE UP (ref 8.5–10.1)
CHLORIDE SERPL-SCNC: 113 MMOL/L — HIGH (ref 96–108)
CO2 SERPL-SCNC: 26 MMOL/L — SIGNIFICANT CHANGE UP (ref 22–31)
CREAT SERPL-MCNC: 1.29 MG/DL — SIGNIFICANT CHANGE UP (ref 0.5–1.3)
GLUCOSE SERPL-MCNC: 206 MG/DL — HIGH (ref 70–99)
HCT VFR BLD CALC: 42.5 % — SIGNIFICANT CHANGE UP (ref 39–50)
HGB BLD-MCNC: 14.6 G/DL — SIGNIFICANT CHANGE UP (ref 13–17)
MCHC RBC-ENTMCNC: 33 PG — SIGNIFICANT CHANGE UP (ref 27–34)
MCHC RBC-ENTMCNC: 34.4 GM/DL — SIGNIFICANT CHANGE UP (ref 32–36)
MCV RBC AUTO: 96.2 FL — SIGNIFICANT CHANGE UP (ref 80–100)
PLATELET # BLD AUTO: 140 K/UL — LOW (ref 150–400)
POTASSIUM SERPL-MCNC: 4.4 MMOL/L — SIGNIFICANT CHANGE UP (ref 3.5–5.3)
POTASSIUM SERPL-SCNC: 4.4 MMOL/L — SIGNIFICANT CHANGE UP (ref 3.5–5.3)
RBC # BLD: 4.42 M/UL — SIGNIFICANT CHANGE UP (ref 4.2–5.8)
RBC # FLD: 13.2 % — SIGNIFICANT CHANGE UP (ref 10.3–14.5)
SODIUM SERPL-SCNC: 143 MMOL/L — SIGNIFICANT CHANGE UP (ref 135–145)
TSH SERPL-MCNC: <0.01 UU/ML — LOW (ref 0.34–4.82)
WBC # BLD: 5.12 K/UL — SIGNIFICANT CHANGE UP (ref 3.8–10.5)
WBC # FLD AUTO: 5.12 K/UL — SIGNIFICANT CHANGE UP (ref 3.8–10.5)

## 2021-01-24 PROCEDURE — 99232 SBSQ HOSP IP/OBS MODERATE 35: CPT

## 2021-01-24 PROCEDURE — 99239 HOSP IP/OBS DSCHRG MGMT >30: CPT

## 2021-01-24 RX ORDER — LEVOTHYROXINE SODIUM 125 MCG
1 TABLET ORAL
Qty: 0 | Refills: 0 | DISCHARGE

## 2021-01-24 RX ORDER — TRAMADOL HYDROCHLORIDE 50 MG/1
1 TABLET ORAL
Qty: 15 | Refills: 0
Start: 2021-01-24 | End: 2021-01-28

## 2021-01-24 RX ORDER — LEVOTHYROXINE SODIUM 125 MCG
1 TABLET ORAL
Qty: 30 | Refills: 0
Start: 2021-01-24 | End: 2021-02-22

## 2021-01-24 RX ORDER — TOPIRAMATE 25 MG
1 TABLET ORAL
Qty: 0 | Refills: 0 | DISCHARGE

## 2021-01-24 RX ORDER — ACETAMINOPHEN 500 MG
2 TABLET ORAL
Qty: 0 | Refills: 0 | DISCHARGE
Start: 2021-01-24

## 2021-01-24 RX ORDER — LEVOTHYROXINE SODIUM 125 MCG
1 TABLET ORAL
Qty: 0 | Refills: 0 | DISCHARGE
Start: 2021-01-24

## 2021-01-24 RX ADMIN — Medication 15 MILLIGRAM(S): at 08:44

## 2021-01-24 RX ADMIN — Medication 150 MILLIGRAM(S): at 08:46

## 2021-01-24 RX ADMIN — Medication 3: at 12:23

## 2021-01-24 RX ADMIN — Medication 650 MILLIGRAM(S): at 04:59

## 2021-01-24 RX ADMIN — Medication 10 MILLIGRAM(S): at 08:43

## 2021-01-24 RX ADMIN — Medication 50 MILLIGRAM(S): at 12:24

## 2021-01-24 RX ADMIN — Medication 1: at 08:36

## 2021-01-24 RX ADMIN — Medication 650 MILLIGRAM(S): at 12:24

## 2021-01-24 RX ADMIN — Medication 81 MILLIGRAM(S): at 08:37

## 2021-01-24 RX ADMIN — Medication 137 MICROGRAM(S): at 04:54

## 2021-01-24 NOTE — DISCHARGE NOTE PROVIDER - CARE PROVIDER_API CALL
Fariha Wilhelm  INTERNAL MEDICINE  [address] [place] [address]  Phone: [phone]  Fax: [phone]  Follow Up Time: 2 weeks    David Walton)  Neurology  [address] [place] [address], [address] [place] [address]  Phone: [phone]  Fax: [phone]  Follow Up Time: 1 week    PCP,   Phone: (   )    -  Fax: (   )    -  Follow Up Time: 1 week   Fariha Wilhelm  INTERNAL MEDICINE  [address] [place] [address]  Phone: [phone]  Fax: [phone]  Follow Up Time: 2 weeks    David Walton)  Neurology  [address] [place] [address], [address] [place] [address]  Phone: [phone]  Fax: [phone]  Follow Up Time: 1 week    PCP,   Phone: (   )    -  Fax: (   )    -  Follow Up Time: 1 week    Myrtle Tobias (DPM)  Surgery Orthopaedic Surgery  [address] [place] [address], [address] [place] [address]  Phone: [phone]  Fax: [phone]  Follow Up Time: 1 week

## 2021-01-24 NOTE — DISCHARGE NOTE NURSING/CASE MANAGEMENT/SOCIAL WORK - PATIENT PORTAL LINK FT
You can access the FollowMyHealth Patient Portal offered by [facility] by registering at the following website: [email or web address]. By joining [employer]’s FollowMyHealth portal, you will also be able to view your health information using other applications (apps) compatible with our system.
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
## 2021-01-24 NOTE — DISCHARGE NOTE PROVIDER - NSDCMRMEDTOKEN_GEN_ALL_CORE_FT
Aspirin Enteric Coated 81 mg oral delayed release tablet: 1 tab(s) orally once a day  atorvastatin 40 mg oral tablet: 1 tab(s) orally once a day  desmopressin 0.1 mg oral tablet: 1 tab(s) orally once a day (in the evening)  ferrous sulfate 325 mg (65 mg elemental iron) oral tablet: 1 tab(s) orally once a day  furosemide 20 mg oral tablet: 1 tab(s) orally once a day  hydrocortisone 5 mg oral tablet: 3 tab(s) orally once a day (in the morning)  hydrocortisone 5 mg oral tablet: 1 tab(s) orally once a day (in the evening)  levETIRAcetam 500 mg oral tablet, extended release: 1 tab(s) orally once a day (at bedtime)  levothyroxine 137 mcg (0.137 mg) oral tablet: 1 tab(s) orally once a day  metFORMIN 1000 mg oral tablet: 1 tab(s) orally 2 times a day  metoclopramide 10 mg oral tablet: 1 tab(s) orally once a day  pantoprazole 40 mg oral delayed release tablet: 1 tab(s) orally once a day  potassium chloride 10 mEq oral capsule, extended release: 1 cap(s) orally once a day  tamsulosin 0.4 mg oral capsule: 2 cap(s) orally once a day (at bedtime)  topiramate 50 mg oral tablet: 1 tab(s) orally once a day (in the morning)  topiramate 50 mg oral tablet: 2 tab(s) orally once a day (at bedtime)  Trulicity Pen 1.5 mg/0.5 mL subcutaneous solution: Inject 1.5mg subcutaneously once a week on Mon  venlafaxine 150 mg oral capsule, extended release: 1 cap(s) orally once a day  Vitamin D3 5000 intl units (125 mcg) oral tablet: 1 tab(s) orally once a day  Xarelto 20 mg oral tablet: 1 tab(s) orally once a day (in the evening)  Xyosted 75 mg/0.5 mL subcutaneous solution: Inject 75mg subcutaneously once a week on Thursday   acetaminophen 325 mg oral tablet: 2 tab(s) orally every 6 hours, As needed, Mild Pain (1 - 3)  Aspirin Enteric Coated 81 mg oral delayed release tablet: 1 tab(s) orally once a day  atorvastatin 40 mg oral tablet: 1 tab(s) orally once a day  desmopressin 0.1 mg oral tablet: 1 tab(s) orally once a day (in the evening)  ferrous sulfate 325 mg (65 mg elemental iron) oral tablet: 1 tab(s) orally once a day  furosemide 20 mg oral tablet: 1 tab(s) orally once a day  hydrocortisone 5 mg oral tablet: 3 tab(s) orally once a day (in the morning)  hydrocortisone 5 mg oral tablet: 1 tab(s) orally once a day (in the evening)  levETIRAcetam 500 mg oral tablet, extended release: 1 tab(s) orally once a day (at bedtime)  metFORMIN 1000 mg oral tablet: 1 tab(s) orally 2 times a day  metoclopramide 10 mg oral tablet: 1 tab(s) orally once a day  pantoprazole 40 mg oral delayed release tablet: 1 tab(s) orally once a day  potassium chloride 10 mEq oral capsule, extended release: 1 cap(s) orally once a day  Synthroid 112 mcg (0.112 mg) oral tablet: 1 tab(s) orally once a day  tamsulosin 0.4 mg oral capsule: 2 cap(s) orally once a day (at bedtime)  topiramate 50 mg oral tablet: 2 tab(s) orally once a day (at bedtime)  topiramate 50 mg oral tablet: 1 tab(s) orally once a day (in the morning) Need to follow up with neuro in 1 week to discuss if need to increase am dose to 2 tabs   traMADol 50 mg oral tablet: 1 tab(s) orally every 8 hours, As Needed -for moderate pain MDD:3 tabs   Trulicity Pen 1.5 mg/0.5 mL subcutaneous solution: Inject 1.5mg subcutaneously once a week on Mon  venlafaxine 150 mg oral capsule, extended release: 1 cap(s) orally once a day  Vitamin D3 5000 intl units (125 mcg) oral tablet: 1 tab(s) orally once a day  Xarelto 20 mg oral tablet: 1 tab(s) orally once a day (in the evening)  Xyosted 75 mg/0.5 mL subcutaneous solution: Inject 75mg subcutaneously once a week on Thursday   acetaminophen 325 mg oral tablet: 2 tab(s) orally every 6 hours, As needed, Mild Pain (1 - 3)  Aspirin Enteric Coated 81 mg oral delayed release tablet: 1 tab(s) orally once a day  atorvastatin 40 mg oral tablet: 1 tab(s) orally once a day  desmopressin 0.1 mg oral tablet: 1 tab(s) orally once a day (in the evening)  ferrous sulfate 325 mg (65 mg elemental iron) oral tablet: 1 tab(s) orally once a day  furosemide 20 mg oral tablet: 1 tab(s) orally once a day  hydrocortisone 5 mg oral tablet: 3 tab(s) orally once a day (in the morning)  hydrocortisone 5 mg oral tablet: 1 tab(s) orally once a day (in the evening)  levETIRAcetam 500 mg oral tablet, extended release: 1 tab(s) orally once a day (at bedtime)  levothyroxine 137 mcg (0.137 mg) oral tablet: 1 tab(s) orally once a day  metFORMIN 1000 mg oral tablet: 1 tab(s) orally 2 times a day  metoclopramide 10 mg oral tablet: 1 tab(s) orally once a day  pantoprazole 40 mg oral delayed release tablet: 1 tab(s) orally once a day  potassium chloride 10 mEq oral capsule, extended release: 1 cap(s) orally once a day  tamsulosin 0.4 mg oral capsule: 2 cap(s) orally once a day (at bedtime)  topiramate 50 mg oral tablet: 2 tab(s) orally once a day (at bedtime)  topiramate 50 mg oral tablet: 1 tab(s) orally once a day (in the morning) Need to follow up with neuro in 1 week to discuss if need to increase am dose to 2 tabs   traMADol 50 mg oral tablet: 1 tab(s) orally every 8 hours, As Needed -for moderate pain MDD:3 tabs   Trulicity Pen 1.5 mg/0.5 mL subcutaneous solution: Inject 1.5mg subcutaneously once a week on Mon  venlafaxine 150 mg oral capsule, extended release: 1 cap(s) orally once a day  Vitamin D3 5000 intl units (125 mcg) oral tablet: 1 tab(s) orally once a day  Xarelto 20 mg oral tablet: 1 tab(s) orally once a day (in the evening)  Xyosted 75 mg/0.5 mL subcutaneous solution: Inject 75mg subcutaneously once a week on Thursday
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
## 2021-01-24 NOTE — PROGRESS NOTE ADULT - SUBJECTIVE AND OBJECTIVE BOX
Interval History:  1/24/21: Has discomfort in left leg, otherwise no complaints.    MEDICATIONS  (STANDING):  aspirin enteric coated 81 milliGRAM(s) Oral daily  atorvastatin 40 milliGRAM(s) Oral at bedtime  desmopressin 0.1 milliGRAM(s) Oral at bedtime  dextrose 40% Gel 15 Gram(s) Oral once  dextrose 5%. 1000 milliLiter(s) (50 mL/Hr) IV Continuous <Continuous>  dextrose 5%. 1000 milliLiter(s) (100 mL/Hr) IV Continuous <Continuous>  dextrose 50% Injectable 25 Gram(s) IV Push once  dextrose 50% Injectable 12.5 Gram(s) IV Push once  dextrose 50% Injectable 25 Gram(s) IV Push once  glucagon  Injectable 1 milliGRAM(s) IntraMuscular once  hydrocortisone 15 milliGRAM(s) Oral daily  hydrocortisone 5 milliGRAM(s) Oral at bedtime  influenza   Vaccine 0.5 milliLiter(s) IntraMuscular once  insulin glargine Injectable (LANTUS) 14 Unit(s) SubCutaneous at bedtime  insulin lispro (ADMELOG) corrective regimen sliding scale   SubCutaneous three times a day before meals  insulin lispro (ADMELOG) corrective regimen sliding scale   SubCutaneous at bedtime  levETIRAcetam  milliGRAM(s) Oral at bedtime  levothyroxine 137 MICROGram(s) Oral daily  metoclopramide 10 milliGRAM(s) Oral daily  rivaroxaban 10 milliGRAM(s) Oral with dinner  tamsulosin 0.8 milliGRAM(s) Oral at bedtime  topiramate 50 milliGRAM(s) Oral daily  topiramate 100 milliGRAM(s) Oral at bedtime  venlafaxine XR. 150 milliGRAM(s) Oral daily    MEDICATIONS  (PRN):  acetaminophen   Tablet .. 650 milliGRAM(s) Oral every 6 hours PRN Mild Pain (1 - 3), Moderate Pain (4 - 6)      Allergies    No Known Allergies    Intolerances        PHYSICAL EXAM:  Vital Signs Last 24 Hrs  T(F): 97.6 (01-24-21 @ 09:23)  HR: 92 (01-24-21 @ 09:23)  BP: 120/78 (01-24-21 @ 09:23)  RR: 17 (01-23-21 @ 16:21)    GENERAL: NAD, well-groomed, well-developed  HEAD:  Atraumatic, Normocephalic  Neuro:  Awake, alert, no aphasia  CN: PERRL, EOMI, no nystagmus, no facial weakness, tongue protrudes in the midline  motor: normal tone, no pronator drift, full strength in b/l upper extremities and right lower extremity. Left leg in cast but able to lift against gravity.  sensory: intact to light touch  coordination: finger to nose intact bilaterally  DTRs: symmetric    LABS:                        14.6   5.12  )-----------( 140      ( 24 Jan 2021 07:02 )             42.5     01-24    143  |  113<H>  |  17  ----------------------------<  206<H>  4.4   |  26  |  1.29    Ca    9.0      24 Jan 2021 07:02            RADIOLOGY & ADDITIONAL STUDIES:        CT head/CT maxillofacial/CT cervical spine 1/20/21 at 14:40    There is no evidence of skull fracture, mass effect or acute lobar infarction.    Hyperdensity subjacent to the right frontoparietal and temporal craniotomy, may represent dural thickening however a small subdural hematoma cannot be entirely excluded. Also note that CT maxillofacial was obtained after intravenous contrast administration for CT of the chest and abdomen/pelvis and the hyperdensity appears to be uniformly enhancing, felt to be dural thickening rather than hematoma. However, attention on follow-up head CT is recommended.    There is no facial bone fracture. Foci of air and soft tissue stranding in the left premalar/buccal region may represent the site of injury. Hyperdense focus deep in the buccal soft tissues may represent foreign object. Direct inspection is recommended.    There is no cervical spine fracture.    CT head no contrast 1/20/21 at 18:45  1.  Right pterional craniotomy. Stable subjacent extra-axial hyperdensity measuring 4 mm suggesting dural thickening versus subdural hematoma.  2.  Redemonstration of left facial soft tissue swelling and hematoma with a 3 mm linear radiopaque object suggesting a foreign body.    24 hour EEG 1/21/21-1/22/21:  -Mild generalized slowing  -Right parietal and temporal focal slowing

## 2021-01-24 NOTE — DISCHARGE NOTE PROVIDER - CARE PROVIDERS DIRECT ADDRESSES
,DirectAddress_Unknown,[email or web address],DirectAddress_Unknown ,DirectAddress_Unknown,[email or web address],DirectAddress_Unknown,DirectAddress_Unknown

## 2021-01-24 NOTE — DISCHARGE NOTE PROVIDER - PROVIDER TOKENS
PROVIDER:[TOKEN:[[id number]],FOLLOWUP:[2 weeks]],PROVIDER:[TOKEN:[[id number]],FOLLOWUP:[1 week]],FREE:[LAST:[PCP],PHONE:[(   )    -],FAX:[(   )    -],FOLLOWUP:[1 week]] PROVIDER:[TOKEN:[[id number]],FOLLOWUP:[2 weeks]],PROVIDER:[TOKEN:[[id number]],FOLLOWUP:[1 week]],FREE:[LAST:[PCP],PHONE:[(   )    -],FAX:[(   )    -],FOLLOWUP:[1 week]],PROVIDER:[TOKEN:[[id number]],FOLLOWUP:[1 week]]
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
## 2021-01-24 NOTE — PROGRESS NOTE ADULT - PROVIDER SPECIALTY LIST ADULT
Hospitalist
Neurology
Neurosurgery
Neurology
Neurology
Trauma Surgery
Hospitalist
Trauma Surgery
Hospitalist

## 2021-01-26 PROBLEM — D49.6 NEOPLASM OF UNSPECIFIED BEHAVIOR OF BRAIN: Chronic | Status: ACTIVE | Noted: 2021-01-23

## 2021-01-26 PROBLEM — I82.409 ACUTE EMBOLISM AND THROMBOSIS OF UNSPECIFIED DEEP VEINS OF UNSPECIFIED LOWER EXTREMITY: Chronic | Status: ACTIVE | Noted: 2021-01-23

## 2021-01-26 PROBLEM — E03.9 HYPOTHYROIDISM, UNSPECIFIED: Chronic | Status: ACTIVE | Noted: 2021-01-23

## 2021-01-26 PROBLEM — G47.33 OBSTRUCTIVE SLEEP APNEA (ADULT) (PEDIATRIC): Chronic | Status: ACTIVE | Noted: 2021-01-23

## 2021-01-26 PROBLEM — G44.201: Chronic | Status: ACTIVE | Noted: 2021-01-23

## 2021-01-26 PROBLEM — E11.9 TYPE 2 DIABETES MELLITUS WITHOUT COMPLICATIONS: Chronic | Status: ACTIVE | Noted: 2021-01-23

## 2021-01-26 PROBLEM — I26.99 OTHER PULMONARY EMBOLISM WITHOUT ACUTE COR PULMONALE: Chronic | Status: ACTIVE | Noted: 2021-01-23

## 2021-01-26 PROBLEM — E78.5 HYPERLIPIDEMIA, UNSPECIFIED: Chronic | Status: ACTIVE | Noted: 2021-01-23
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
## 2021-02-02 DIAGNOSIS — E03.9 HYPOTHYROIDISM, UNSPECIFIED: ICD-10-CM

## 2021-02-02 DIAGNOSIS — Z92.3 PERSONAL HISTORY OF IRRADIATION: ICD-10-CM

## 2021-02-02 DIAGNOSIS — R93.0 ABNORMAL FINDINGS ON DIAGNOSTIC IMAGING OF SKULL AND HEAD, NOT ELSEWHERE CLASSIFIED: ICD-10-CM

## 2021-02-02 DIAGNOSIS — E78.5 HYPERLIPIDEMIA, UNSPECIFIED: ICD-10-CM

## 2021-02-02 DIAGNOSIS — S01.412A LACERATION WITHOUT FOREIGN BODY OF LEFT CHEEK AND TEMPOROMANDIBULAR AREA, INITIAL ENCOUNTER: ICD-10-CM

## 2021-02-02 DIAGNOSIS — Z79.01 LONG TERM (CURRENT) USE OF ANTICOAGULANTS: ICD-10-CM

## 2021-02-02 DIAGNOSIS — E89.3 POSTPROCEDURAL HYPOPITUITARISM: ICD-10-CM

## 2021-02-02 DIAGNOSIS — M25.572 PAIN IN LEFT ANKLE AND JOINTS OF LEFT FOOT: ICD-10-CM

## 2021-02-02 DIAGNOSIS — G47.33 OBSTRUCTIVE SLEEP APNEA (ADULT) (PEDIATRIC): ICD-10-CM

## 2021-02-02 DIAGNOSIS — S27.321A CONTUSION OF LUNG, UNILATERAL, INITIAL ENCOUNTER: ICD-10-CM

## 2021-02-02 DIAGNOSIS — Z86.718 PERSONAL HISTORY OF OTHER VENOUS THROMBOSIS AND EMBOLISM: ICD-10-CM

## 2021-02-02 DIAGNOSIS — E11.22 TYPE 2 DIABETES MELLITUS WITH DIABETIC CHRONIC KIDNEY DISEASE: ICD-10-CM

## 2021-02-02 DIAGNOSIS — Z86.711 PERSONAL HISTORY OF PULMONARY EMBOLISM: ICD-10-CM

## 2021-02-02 DIAGNOSIS — N18.2 CHRONIC KIDNEY DISEASE, STAGE 2 (MILD): ICD-10-CM
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
## 2021-02-02 DIAGNOSIS — S92.352A DISPLACED FRACTURE OF FIFTH METATARSAL BONE, LEFT FOOT, INITIAL ENCOUNTER FOR CLOSED FRACTURE: ICD-10-CM

## 2021-02-02 DIAGNOSIS — M25.562 PAIN IN LEFT KNEE: ICD-10-CM
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
## 2021-02-02 DIAGNOSIS — E86.0 DEHYDRATION: ICD-10-CM

## 2021-02-02 DIAGNOSIS — R56.9 UNSPECIFIED CONVULSIONS: ICD-10-CM

## 2021-02-10 ENCOUNTER — APPOINTMENT (OUTPATIENT)
Dept: NEUROLOGY | Facility: CLINIC | Age: 71
End: 2021-02-10
Payer: COMMERCIAL

## 2021-02-10 VITALS
SYSTOLIC BLOOD PRESSURE: 102 MMHG | HEIGHT: 71 IN | BODY MASS INDEX: 37.52 KG/M2 | DIASTOLIC BLOOD PRESSURE: 60 MMHG | TEMPERATURE: 97.5 F | WEIGHT: 268 LBS | HEART RATE: 80 BPM
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
## 2021-02-10 PROCEDURE — 99072 ADDL SUPL MATRL&STAF TM PHE: CPT

## 2021-02-10 PROCEDURE — 99215 OFFICE O/P EST HI 40 MIN: CPT

## 2021-02-10 RX ORDER — GLIMEPIRIDE 2 MG/1
2 TABLET ORAL
Refills: 0 | Status: DISCONTINUED | COMMUNITY
End: 2021-02-10

## 2021-02-10 RX ORDER — TOPIRAMATE 50 MG/1
50 TABLET, FILM COATED ORAL
Refills: 0 | Status: DISCONTINUED | COMMUNITY
End: 2021-02-10

## 2021-02-10 RX ORDER — TOPIRAMATE 50 MG/1
50 TABLET, FILM COATED ORAL TWICE DAILY
Qty: 180 | Refills: 0 | Status: DISCONTINUED | COMMUNITY
Start: 2020-02-28 | End: 2021-02-10

## 2021-02-10 NOTE — REVIEW OF SYSTEMS
[As Noted in HPI] : as noted in HPI [Memory Lapses or Loss] : memory loss [Poor Coordination] : poor coordination [Decr. Concentrating Ability] : decreased concentrating ability [Tension Headache] : tension-type headaches [Negative] : Heme/Lymph [Seizures] : convulsions [Migraine Headache] : migraine headaches

## 2021-02-10 NOTE — DATA REVIEWED
[de-identified] : 2/10/20: CT head: Status post right craniotomy with underlying due to of thickening without significant change from prior study, no evidence of acute intracranial hemorrhage

## 2021-02-10 NOTE — PHYSICAL EXAM
[General Appearance - Alert] : alert [General Appearance - In No Acute Distress] : in no acute distress [Oriented To Time, Place, And Person] : oriented to person, place, and time [Impaired Insight] : insight and judgment were intact [Affect] : the affect was normal [Person] : oriented to person [Place] : oriented to place [Time] : oriented to time [Concentration Intact] : normal concentrating ability [Visual Intact] : visual attention was ~T not ~L decreased [Naming Objects] : no difficulty naming common objects [Repeating Phrases] : no difficulty repeating a phrase [Writing A Sentence] : no difficulty writing a sentence [Fluency] : fluency intact [Comprehension] : comprehension intact [Past History] : adequate knowledge of personal past history [Reading] : reading intact [Cranial Nerves Optic (II)] : visual acuity intact bilaterally,  visual fields full to confrontation, pupils equal round and reactive to light [Cranial Nerves Oculomotor (III)] : extraocular motion intact [Cranial Nerves Trigeminal (V)] : facial sensation intact symmetrically [Cranial Nerves Facial (VII)] : face symmetrical [Cranial Nerves Vestibulocochlear (VIII)] : hearing was intact bilaterally [Cranial Nerves Glossopharyngeal (IX)] : tongue and palate midline [Cranial Nerves Accessory (XI - Cranial And Spinal)] : head turning and shoulder shrug symmetric [Cranial Nerves Hypoglossal (XII)] : there was no tongue deviation with protrusion [Motor Tone] : muscle tone was normal in all four extremities [Motor Strength] : muscle strength was normal in all four extremities [No Muscle Atrophy] : normal bulk in all four extremities [Sensation Tactile Decrease] : light touch was intact [Vibration Decrease Leg / Foot Both Ankles] : decreased at both ankles [Vibration Decrease Leg / Foot Toes Both Feet] : decreased at the toes of both feet  [Limited Balance] : the patient's balance was impaired [Tremor] : a tremor present [2+] : Patella left 2+ [1+] : Ankle jerk right 1+ [PERRL With Normal Accommodation] : pupils were equal in size, round, reactive to light, with normal accommodation [Extraocular Movements] : extraocular movements were intact [Full Visual Field] : full visual field [Neck Cervical Mass (___cm)] : no neck mass was observed [] : no respiratory distress [Auscultation Breath Sounds / Voice Sounds] : lungs were clear to auscultation bilaterally [Heart Rate And Rhythm] : heart rate was normal and rhythm regular [Heart Sounds] : normal S1 and S2 [Short Term Intact] : short term memory impaired [Past-pointing] : there was no past-pointing [Dysdiadochokinesia Bilaterally] : not present [Coordination - Dysmetria Impaired Finger-to-Nose Bilateral] : not present [Plantar Reflex Right Only] : normal on the right [Plantar Reflex Left Only] : normal on the left [FreeTextEntry6] : Left foot in splint [Papilledema Of Both Eyes] : no papilledema
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
## 2021-02-10 NOTE — REASON FOR VISIT
[Spouse] : spouse [Post Hospitalization] : a post hospitalization visit [FreeTextEntry1] : for migraine HA's / seizure

## 2021-02-24 ENCOUNTER — APPOINTMENT (OUTPATIENT)
Dept: NEUROLOGY | Facility: CLINIC | Age: 71
End: 2021-02-24
Payer: COMMERCIAL

## 2021-02-24 PROCEDURE — 95816 EEG AWAKE AND DROWSY: CPT

## 2021-02-24 PROCEDURE — 99072 ADDL SUPL MATRL&STAF TM PHE: CPT

## 2021-02-26 ENCOUNTER — NON-APPOINTMENT (OUTPATIENT)
Age: 71
End: 2021-02-26

## 2021-03-17 ENCOUNTER — APPOINTMENT (OUTPATIENT)
Dept: NEUROLOGY | Facility: CLINIC | Age: 71
End: 2021-03-17
Payer: MEDICARE

## 2021-03-17 PROCEDURE — 99214 OFFICE O/P EST MOD 30 MIN: CPT | Mod: 95

## 2021-03-17 NOTE — DATA REVIEWED
[de-identified] : 2/24/21: EEG shows focal slowing in the right temporal and parietal region superimposed on generalized slowing, Potentially structural in nature, no epileptiform discharges or seizures.. [de-identified] : 2/10/20: CT head: Status post right craniotomy with underlying due to of thickening without significant change from prior study, no evidence of acute intracranial hemorrhage

## 2021-03-17 NOTE — PHYSICAL EXAM
[General Appearance - Alert] : alert [General Appearance - In No Acute Distress] : in no acute distress [Oriented To Time, Place, And Person] : oriented to person, place, and time [Impaired Insight] : insight and judgment were intact [Affect] : the affect was normal [Person] : oriented to person [Place] : oriented to place [Time] : oriented to time [Short Term Intact] : short term memory impaired [Registration Intact] : recent registration memory intact [Concentration Intact] : normal concentrating ability [Naming Objects] : no difficulty naming common objects [Repeating Phrases] : no difficulty repeating a phrase [Fluency] : fluency intact [Comprehension] : comprehension intact [Past History] : adequate knowledge of personal past history [Cranial Nerves Optic (II)] : visual acuity intact bilaterally,  visual fields full to confrontation, pupils equal round and reactive to light [Cranial Nerves Oculomotor (III)] : extraocular motion intact [Cranial Nerves Facial (VII)] : face symmetrical [Cranial Nerves Vestibulocochlear (VIII)] : hearing was intact bilaterally [Cranial Nerves Accessory (XI - Cranial And Spinal)] : head turning and shoulder shrug symmetric [Cranial Nerves Hypoglossal (XII)] : there was no tongue deviation with protrusion [No Muscle Atrophy] : normal bulk in all four extremities [Plantar Reflex Right Only] : normal on the right [Plantar Reflex Left Only] : normal on the left [FreeTextEntry6] : Limited virtual motor exam

## 2021-03-17 NOTE — HISTORY OF PRESENT ILLNESS
[Home] : at home, [unfilled] , at the time of the visit. [Medical Office: ([facility])___] : at the medical office located in  [Spouse] : spouse [Verbal consent obtained from patient] : the patient, [unfilled] [FreeTextEntry1] : Patient for Telehealth followup visit, last seen on 2/10/21. She has been taking Topiramate 100 mg twice daily in addition to Migrelief 2 capsules daily, he reports he has noted remarkable improvement of headaches, he gets an occasional headache that is mild.\par \par Patient has not had any seizures, seizure-like activity, periods of zoning out/aura or LOC\par \par EEG done shows focal slowing in the right temporal and parietal region superimposed on generalized slowing, Likely structural in nature.
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
## 2021-03-17 NOTE — REVIEW OF SYSTEMS
[As Noted in HPI] : as noted in HPI [Memory Lapses or Loss] : memory loss [Decr. Concentrating Ability] : decreased concentrating ability [Poor Coordination] : poor coordination [Migraine Headache] : migraine headaches [Tension Headache] : tension-type headaches [Negative] : Heme/Lymph

## 2021-05-28 ENCOUNTER — NON-APPOINTMENT (OUTPATIENT)
Age: 71
End: 2021-05-28

## 2021-06-25 ENCOUNTER — APPOINTMENT (OUTPATIENT)
Dept: NEUROLOGY | Facility: CLINIC | Age: 71
End: 2021-06-25
Payer: MEDICARE

## 2021-06-25 VITALS
WEIGHT: 270 LBS | SYSTOLIC BLOOD PRESSURE: 128 MMHG | HEART RATE: 76 BPM | DIASTOLIC BLOOD PRESSURE: 74 MMHG | BODY MASS INDEX: 37.8 KG/M2 | HEIGHT: 71 IN | TEMPERATURE: 97.6 F

## 2021-06-25 PROCEDURE — 99214 OFFICE O/P EST MOD 30 MIN: CPT

## 2021-06-25 PROCEDURE — 99072 ADDL SUPL MATRL&STAF TM PHE: CPT

## 2021-06-25 NOTE — PHYSICAL EXAM
[General Appearance - Alert] : alert [General Appearance - In No Acute Distress] : in no acute distress [Oriented To Time, Place, And Person] : oriented to person, place, and time [Impaired Insight] : insight and judgment were intact [Affect] : the affect was normal [Person] : oriented to person [Place] : oriented to place [Time] : oriented to time [Concentration Intact] : normal concentrating ability [Naming Objects] : no difficulty naming common objects [Repeating Phrases] : no difficulty repeating a phrase [Fluency] : fluency intact [Comprehension] : comprehension intact [Past History] : adequate knowledge of personal past history [Cranial Nerves Optic (II)] : visual acuity intact bilaterally,  visual fields full to confrontation, pupils equal round and reactive to light [Cranial Nerves Oculomotor (III)] : extraocular motion intact [Cranial Nerves Trigeminal (V)] : facial sensation intact symmetrically [Cranial Nerves Facial (VII)] : face symmetrical [Cranial Nerves Vestibulocochlear (VIII)] : hearing was intact bilaterally [Cranial Nerves Glossopharyngeal (IX)] : tongue and palate midline [Cranial Nerves Accessory (XI - Cranial And Spinal)] : head turning and shoulder shrug symmetric [Cranial Nerves Hypoglossal (XII)] : there was no tongue deviation with protrusion [Motor Tone] : muscle tone was normal in all four extremities [Motor Strength] : muscle strength was normal in all four extremities [No Muscle Atrophy] : normal bulk in all four extremities [Sensation Tactile Decrease] : light touch was intact [Limited Balance] : the patient's balance was impaired [Tremor] : a tremor present [2+] : Patella left 2+ [1+] : Ankle jerk right 1+ [PERRL With Normal Accommodation] : pupils were equal in size, round, reactive to light, with normal accommodation [Extraocular Movements] : extraocular movements were intact [Full Visual Field] : full visual field [] : the neck was supple [Neck Cervical Mass (___cm)] : no neck mass was observed [Auscultation Breath Sounds / Voice Sounds] : lungs were clear to auscultation bilaterally [Short Term Intact] : short term memory impaired [Registration Intact] : recent registration memory intact [Past-pointing] : there was no past-pointing [Dysdiadochokinesia Bilaterally] : not present [Coordination - Dysmetria Impaired Finger-to-Nose Bilateral] : not present [Plantar Reflex Right Only] : normal on the right [Plantar Reflex Left Only] : normal on the left [FreeTextEntry6] : Left foot in splint [Papilledema Of Both Eyes] : no papilledema
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
## 2021-06-25 NOTE — DATA REVIEWED
[de-identified] : 2/24/21: EEG shows focal slowing in the right temporal and parietal region superimposed on generalized slowing, Potentially structural in nature, no epileptiform discharges or seizures.. [de-identified] : 2/10/20: CT head: Status post right craniotomy with underlying due to of thickening without significant change from prior study, no evidence of acute intracranial hemorrhage

## 2021-06-25 NOTE — HISTORY OF PRESENT ILLNESS
[FreeTextEntry1] : PT is here for a followup visit today, last Telehealth visit on 3/17/21. Pt was admitted to [facility] in mid May 2021 with severe / intense headaches that lasted 2-3 days,  was discharged home on Depakote, I addition he was taking Topiramate 200 mg b.i.d., he noted no relief with Depakote + topiramate, I recommended trial with Nurtec or Ubrelvy, I provided some samples, he noted remarkable relief with Nurtec,  however with insurance coverage the copayment was very high. Patient had tried Ubrelvy sample did not obtain much relief.\par \par He reports over the past 2 weeks he has noted remarkable reduction in the headache frequency and intensity, he is having mild to moderate headache almost every other day..\par

## 2021-08-02 ENCOUNTER — RX RENEWAL (OUTPATIENT)
Age: 71
End: 2021-08-02

## 2021-08-18 ENCOUNTER — APPOINTMENT (OUTPATIENT)
Dept: NEUROLOGY | Facility: CLINIC | Age: 71
End: 2021-08-18
Payer: MEDICARE

## 2021-08-18 VITALS
HEART RATE: 99 BPM | HEIGHT: 71 IN | BODY MASS INDEX: 37.8 KG/M2 | TEMPERATURE: 97.2 F | DIASTOLIC BLOOD PRESSURE: 71 MMHG | WEIGHT: 270 LBS | SYSTOLIC BLOOD PRESSURE: 113 MMHG

## 2021-08-18 PROCEDURE — 99214 OFFICE O/P EST MOD 30 MIN: CPT

## 2021-08-18 NOTE — HISTORY OF PRESENT ILLNESS
[FreeTextEntry1] : Pt is here for a followup visit today, last visit on 6/25/21. Pt Continues to have daily headaches, headaches are mild to moderate in intensity grades 2-4/10, he is taking Topiramate 200 mg b.i.d., in addition to Depakote 500 mg daily. I had recommended Nurtec or Ubrelvy, I provided some samples, he noted remarkable relief with Nurtec, however with insurance coverage the copayment was very high. Patient had tried Ubrelvy sample did not obtain much relief.\par \par Pt has not had any seizures\par \par

## 2021-08-18 NOTE — PHYSICAL EXAM
[General Appearance - Alert] : alert [General Appearance - In No Acute Distress] : in no acute distress [Oriented To Time, Place, And Person] : oriented to person, place, and time [Impaired Insight] : insight and judgment were intact [Affect] : the affect was normal [Person] : oriented to person [Place] : oriented to place [Time] : oriented to time [Registration Intact] : recent registration memory intact [Concentration Intact] : normal concentrating ability [Naming Objects] : no difficulty naming common objects [Repeating Phrases] : no difficulty repeating a phrase [Fluency] : fluency intact [Comprehension] : comprehension intact [Past History] : adequate knowledge of personal past history [Cranial Nerves Optic (II)] : visual acuity intact bilaterally,  visual fields full to confrontation, pupils equal round and reactive to light [Cranial Nerves Oculomotor (III)] : extraocular motion intact [Cranial Nerves Trigeminal (V)] : facial sensation intact symmetrically [Cranial Nerves Facial (VII)] : face symmetrical [Cranial Nerves Vestibulocochlear (VIII)] : hearing was intact bilaterally [Cranial Nerves Glossopharyngeal (IX)] : tongue and palate midline [Cranial Nerves Accessory (XI - Cranial And Spinal)] : head turning and shoulder shrug symmetric [Cranial Nerves Hypoglossal (XII)] : there was no tongue deviation with protrusion [Motor Tone] : muscle tone was normal in all four extremities [Motor Strength] : muscle strength was normal in all four extremities [No Muscle Atrophy] : normal bulk in all four extremities [Sensation Tactile Decrease] : light touch was intact [Limited Balance] : the patient's balance was impaired [Tremor] : a tremor present [2+] : Patella left 2+ [1+] : Ankle jerk right 1+ [PERRL With Normal Accommodation] : pupils were equal in size, round, reactive to light, with normal accommodation [Extraocular Movements] : extraocular movements were intact [Full Visual Field] : full visual field [] : the neck was supple [Neck Cervical Mass (___cm)] : no neck mass was observed [Auscultation Breath Sounds / Voice Sounds] : lungs were clear to auscultation bilaterally [Short Term Intact] : short term memory impaired [Past-pointing] : there was no past-pointing [Dysdiadochokinesia Bilaterally] : not present [Coordination - Dysmetria Impaired Finger-to-Nose Bilateral] : not present [Plantar Reflex Left Only] : normal on the left [Plantar Reflex Right Only] : normal on the right [FreeTextEntry6] : Left foot in splint [Papilledema Of Both Eyes] : no papilledema
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
## 2021-08-18 NOTE — DATA REVIEWED
[de-identified] : 2/10/20: CT head: Status post right craniotomy with underlying due to of thickening without significant change from prior study, no evidence of acute intracranial hemorrhage  [de-identified] : 2/24/21: EEG shows focal slowing in the right temporal and parietal region superimposed on generalized slowing, Potentially structural in nature, no epileptiform discharges or seizures..

## 2021-11-30 ENCOUNTER — APPOINTMENT (OUTPATIENT)
Dept: NEUROLOGY | Facility: CLINIC | Age: 71
End: 2021-11-30
Payer: MEDICARE

## 2021-11-30 VITALS
DIASTOLIC BLOOD PRESSURE: 67 MMHG | TEMPERATURE: 97.8 F | HEIGHT: 71 IN | HEART RATE: 86 BPM | SYSTOLIC BLOOD PRESSURE: 109 MMHG | BODY MASS INDEX: 37.1 KG/M2 | WEIGHT: 265 LBS

## 2021-11-30 PROCEDURE — 99214 OFFICE O/P EST MOD 30 MIN: CPT

## 2021-11-30 RX ORDER — TOPIRAMATE 100 MG/1
100 TABLET, FILM COATED ORAL TWICE DAILY
Refills: 0 | Status: COMPLETED | COMMUNITY
End: 2021-11-30

## 2021-11-30 RX ORDER — RIMEGEPANT SULFATE 75 MG/75MG
75 TABLET, ORALLY DISINTEGRATING ORAL
Qty: 8 | Refills: 2 | Status: DISCONTINUED | COMMUNITY
Start: 2021-06-04 | End: 2021-11-30

## 2021-11-30 RX ORDER — UBROGEPANT 100 MG/1
100 TABLET ORAL
Qty: 8 | Refills: 2 | Status: DISCONTINUED | COMMUNITY
Start: 2021-06-25 | End: 2021-11-30

## 2021-11-30 NOTE — HISTORY OF PRESENT ILLNESS
[FreeTextEntry1] : Pt is here for a follow-up visit today, last seen on 8/18/21. Pt has noted remakable improvement of headaches, at the last visit Ajovy SC injection was started, he has used 3 injections os far, he has had only 4-5 mild headaches in the past 3 months, he has ? headache like feeling intensity 1/10 daily, he is taking Topiramate 200 mg b.i.d., in addition to Depakote 500 mg daily. Pt tried Nurtec, he noted remarkable relief with Nurtec, however with insurance coverage the copayment was very high. Patient tried Ubrelvy\par \par Pt has not has any seizure like episodes, occasional involuntary leg movements.\par \par He has sleep apnea, [facility] snot uses CPAP
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
## 2021-11-30 NOTE — PHYSICAL EXAM
[General Appearance - Alert] : alert [General Appearance - In No Acute Distress] : in no acute distress [Oriented To Time, Place, And Person] : oriented to person, place, and time [Impaired Insight] : insight and judgment were intact [Affect] : the affect was normal [Person] : oriented to person [Place] : oriented to place [Time] : oriented to time [Registration Intact] : recent registration memory intact [Concentration Intact] : normal concentrating ability [Naming Objects] : no difficulty naming common objects [Repeating Phrases] : no difficulty repeating a phrase [Fluency] : fluency intact [Comprehension] : comprehension intact [Past History] : adequate knowledge of personal past history [Cranial Nerves Optic (II)] : visual acuity intact bilaterally,  visual fields full to confrontation, pupils equal round and reactive to light [Cranial Nerves Oculomotor (III)] : extraocular motion intact [Cranial Nerves Trigeminal (V)] : facial sensation intact symmetrically [Cranial Nerves Facial (VII)] : face symmetrical [Cranial Nerves Vestibulocochlear (VIII)] : hearing was intact bilaterally [Cranial Nerves Glossopharyngeal (IX)] : tongue and palate midline [Cranial Nerves Accessory (XI - Cranial And Spinal)] : head turning and shoulder shrug symmetric [Cranial Nerves Hypoglossal (XII)] : there was no tongue deviation with protrusion [Motor Tone] : muscle tone was normal in all four extremities [Motor Strength] : muscle strength was normal in all four extremities [No Muscle Atrophy] : normal bulk in all four extremities [Sensation Tactile Decrease] : light touch was intact [Limited Balance] : the patient's balance was impaired [Tremor] : a tremor present [2+] : Patella left 2+ [1+] : Ankle jerk right 1+ [PERRL With Normal Accommodation] : pupils were equal in size, round, reactive to light, with normal accommodation [Extraocular Movements] : extraocular movements were intact [Full Visual Field] : full visual field [] : the neck was supple [Neck Cervical Mass (___cm)] : no neck mass was observed [Auscultation Breath Sounds / Voice Sounds] : lungs were clear to auscultation bilaterally [Short Term Intact] : short term memory impaired [Past-pointing] : there was no past-pointing [Dysdiadochokinesia Bilaterally] : not present [Coordination - Dysmetria Impaired Finger-to-Nose Bilateral] : not present [Plantar Reflex Right Only] : normal on the right [Plantar Reflex Left Only] : normal on the left [FreeTextEntry6] : Left foot in splint [Papilledema Of Both Eyes] : no papilledema

## 2021-11-30 NOTE — DATA REVIEWED
[de-identified] : 2/24/21: EEG shows focal slowing in the right temporal and parietal region superimposed on generalized slowing, Potentially structural in nature, no epileptiform discharges or seizures.. [de-identified] : 2/10/20: CT head: Status post right craniotomy with underlying due to of thickening without significant change from prior study, no evidence of acute intracranial hemorrhage

## 2021-12-11 ENCOUNTER — TRANSCRIPTION ENCOUNTER (OUTPATIENT)
Age: 71
End: 2021-12-11

## 2021-12-15 ENCOUNTER — RX RENEWAL (OUTPATIENT)
Age: 71
End: 2021-12-15

## 2022-02-14 ENCOUNTER — RX RENEWAL (OUTPATIENT)
Age: 72
End: 2022-02-14

## 2022-02-22 ENCOUNTER — RX RENEWAL (OUTPATIENT)
Age: 72
End: 2022-02-22

## 2022-03-24 ENCOUNTER — TRANSCRIPTION ENCOUNTER (OUTPATIENT)
Age: 72
End: 2022-03-24

## 2022-03-24 ENCOUNTER — RESULT REVIEW (OUTPATIENT)
Age: 72
End: 2022-03-24

## 2022-03-24 ENCOUNTER — OUTPATIENT (OUTPATIENT)
Dept: OUTPATIENT SERVICES | Facility: HOSPITAL | Age: 72
LOS: 1 days | End: 2022-03-24
Payer: MEDICARE

## 2022-03-24 VITALS
HEIGHT: 71 IN | RESPIRATION RATE: 18 BRPM | DIASTOLIC BLOOD PRESSURE: 54 MMHG | WEIGHT: 265 LBS | TEMPERATURE: 98 F | OXYGEN SATURATION: 99 % | HEART RATE: 80 BPM | SYSTOLIC BLOOD PRESSURE: 113 MMHG

## 2022-03-24 VITALS
RESPIRATION RATE: 14 BRPM | HEART RATE: 85 BPM | OXYGEN SATURATION: 95 % | DIASTOLIC BLOOD PRESSURE: 59 MMHG | SYSTOLIC BLOOD PRESSURE: 106 MMHG

## 2022-03-24 DIAGNOSIS — Z95.828 PRESENCE OF OTHER VASCULAR IMPLANTS AND GRAFTS: Chronic | ICD-10-CM

## 2022-03-24 DIAGNOSIS — Z98.890 OTHER SPECIFIED POSTPROCEDURAL STATES: Chronic | ICD-10-CM

## 2022-03-24 DIAGNOSIS — K22.70 BARRETT'S ESOPHAGUS WITHOUT DYSPLASIA: ICD-10-CM

## 2022-03-24 LAB — GLUCOSE BLDC GLUCOMTR-MCNC: 107 MG/DL — HIGH (ref 70–99)

## 2022-03-24 PROCEDURE — 88305 TISSUE EXAM BY PATHOLOGIST: CPT | Mod: 26

## 2022-03-24 PROCEDURE — 88305 TISSUE EXAM BY PATHOLOGIST: CPT

## 2022-03-24 PROCEDURE — 43239 EGD BIOPSY SINGLE/MULTIPLE: CPT

## 2022-03-24 PROCEDURE — 82962 GLUCOSE BLOOD TEST: CPT

## 2022-03-24 DEVICE — NET RETRV ROT ROTH 2.5MMX230CM: Type: IMPLANTABLE DEVICE | Status: FUNCTIONAL

## 2022-03-24 RX ORDER — SODIUM CHLORIDE 9 MG/ML
500 INJECTION INTRAMUSCULAR; INTRAVENOUS; SUBCUTANEOUS
Refills: 0 | Status: COMPLETED | OUTPATIENT
Start: 2022-03-24 | End: 2022-03-24

## 2022-03-24 RX ADMIN — SODIUM CHLORIDE 75 MILLILITER(S): 9 INJECTION INTRAMUSCULAR; INTRAVENOUS; SUBCUTANEOUS at 16:09

## 2022-03-24 NOTE — ASU PATIENT PROFILE, ADULT - FALL HARM RISK - HARM RISK INTERVENTIONS

## 2022-03-24 NOTE — ASU PATIENT PROFILE, ADULT - NSICDXPASTMEDICALHX_GEN_ALL_CORE_FT
PAST MEDICAL HISTORY:  Brain tumor     Diabetes insipidus     DM (diabetes mellitus)     DVT (deep venous thrombosis)     HLD (hyperlipidemia)     Hypothyroid     Intractable tension-type headache     THOMAS (obstructive sleep apnea)     PE (pulmonary thromboembolism)

## 2022-03-24 NOTE — PRE PROCEDURE NOTE - PRE PROCEDURE EVALUATION
Attending Physician:     Jhon Lopez MD                       Procedure:  EGD EUS EMR    Indication for Procedure:  ________________________________________________________  PAST MEDICAL & SURGICAL HISTORY:  PE (pulmonary thromboembolism)    DVT (deep venous thrombosis)    DM (diabetes mellitus)    Diabetes insipidus    HLD (hyperlipidemia)    THOMAS (obstructive sleep apnea)    Hypothyroid    Intractable tension-type headache    Brain tumor    S/P craniotomy    S/P IVC filter      ALLERGIES:  No Known Allergies    HOME MEDICATIONS:  acetaminophen 325 mg oral tablet: 2 tab(s) orally every 6 hours, As needed, Mild Pain (1 - 3)  Aspirin Enteric Coated 81 mg oral delayed release tablet: 1 tab(s) orally once a day  atorvastatin 40 mg oral tablet: 1 tab(s) orally once a day  desmopressin 0.1 mg oral tablet: 1 tab(s) orally once a day (in the evening)  ferrous sulfate 325 mg (65 mg elemental iron) oral tablet: 1 tab(s) orally once a day  furosemide 20 mg oral tablet: 1 tab(s) orally once a day  hydrocortisone 5 mg oral tablet: 3 tab(s) orally once a day (in the morning)  hydrocortisone 5 mg oral tablet: 1 tab(s) orally once a day (in the evening)  levETIRAcetam 500 mg oral tablet, extended release: 1 tab(s) orally once a day (at bedtime)  levothyroxine 137 mcg (0.137 mg) oral tablet: 1 tab(s) orally once a day  metFORMIN 1000 mg oral tablet: 1 tab(s) orally 2 times a day  metoclopramide 10 mg oral tablet: 1 tab(s) orally once a day  pantoprazole 40 mg oral delayed release tablet: 1 tab(s) orally once a day  potassium chloride 10 mEq oral capsule, extended release: 1 cap(s) orally once a day  tamsulosin 0.4 mg oral capsule: 2 cap(s) orally once a day (at bedtime)  topiramate 50 mg oral tablet: 2 tab(s) orally once a day (at bedtime)  topiramate 50 mg oral tablet: 1 tab(s) orally once a day (in the morning) Need to follow up with neuro in 1 week to discuss if need to increase am dose to 2 tabs   Trulicity Pen 1.5 mg/0.5 mL subcutaneous solution: Inject 1.5mg subcutaneously once a week on Mon  venlafaxine 150 mg oral capsule, extended release: 1 cap(s) orally once a day  Vitamin D3 5000 intl units (125 mcg) oral tablet: 1 tab(s) orally once a day  Xarelto 20 mg oral tablet: 1 tab(s) orally once a day (in the evening)  Xyosted 75 mg/0.5 mL subcutaneous solution: Inject 75mg subcutaneously once a week on Thursday    AICD/PPM: [ ] yes   [ x] no    PERTINENT LAB DATA:                      PHYSICAL EXAMINATION:    Height (cm): 180.3  Weight (kg): 120.2  BMI (kg/m2): 37  BSA (m2): 2.38T(C): 36.6  HR: 80  BP: 113/54  RR: 18  SpO2: 99%    Constitutional: NAD  HEENT: PERRLA, EOMI,    Neck:  No JVD  Respiratory: CTAB/L  Cardiovascular: S1 and S2  Gastrointestinal: BS+, soft, NT/ND  Extremities: No peripheral edema  Neurological: A/O x 3, no focal deficits  Psychiatric: Normal mood, normal affect  Skin: No rashes    ASA Class: I [ ]  II [ ]  III x[ ]  IV [ ]    COMMENTS:    The patient is a suitable candidate for the planned procedure unless box checked [ ]  No, explain:

## 2022-03-24 NOTE — ASU DISCHARGE PLAN (ADULT/PEDIATRIC) - NS MD DC FALL RISK RISK
For information on Fall & Injury Prevention, visit: [email or web address] OR  [email or web address] OR  https://www.cdc.gov/steadi/patient.html

## 2022-03-31 LAB — SURGICAL PATHOLOGY STUDY: SIGNIFICANT CHANGE UP

## 2022-04-18 ENCOUNTER — APPOINTMENT (OUTPATIENT)
Dept: NEUROLOGY | Facility: CLINIC | Age: 72
End: 2022-04-18
Payer: MEDICARE

## 2022-04-18 VITALS
WEIGHT: 265 LBS | DIASTOLIC BLOOD PRESSURE: 70 MMHG | BODY MASS INDEX: 37.1 KG/M2 | HEIGHT: 71 IN | TEMPERATURE: 97.8 F | HEART RATE: 85 BPM | SYSTOLIC BLOOD PRESSURE: 110 MMHG

## 2022-04-18 PROCEDURE — 99214 OFFICE O/P EST MOD 30 MIN: CPT

## 2022-04-18 NOTE — REASON FOR VISIT
[Follow-Up: _____] : a [unfilled] follow-up visit [Family Member] : family member [FreeTextEntry1] : for headaches /seizure

## 2022-04-18 NOTE — REVIEW OF SYSTEMS
no difficulties [As Noted in HPI] : as noted in HPI [Memory Lapses or Loss] : memory loss [Decr. Concentrating Ability] : decreased concentrating ability [Poor Coordination] : poor coordination [Migraine Headache] : migraine headaches [Tension Headache] : tension-type headaches [Negative] : Heme/Lymph

## 2022-04-18 NOTE — DATA REVIEWED
[de-identified] : 2/24/21: EEG shows focal slowing in the right temporal and parietal region superimposed on generalized slowing, Potentially structural in nature, no epileptiform discharges or seizures.. [de-identified] : 2/10/20: CT head: Status post right craniotomy with underlying due to of thickening without significant change from prior study, no evidence of acute intracranial hemorrhage

## 2022-04-18 NOTE — HISTORY OF PRESENT ILLNESS
[FreeTextEntry1] : Pt is here for a follow-up visit today, last seen on 11/30/21. Pt has noted remarkable improvement of headaches, is using Ajovy SC injections monthly he has had occasional headaches 1/10 intensity in the past 4 months,  he is taking Migrelief one capsule daily,, he did not need Nurtec or Ubrelvy.\par \par Pt has not has any seizure like episodes, has occasional involuntary leg movements. he is taking Topiramate 200 mg b.i.d., in addition to Depakote 500 mg daily.\par \par He has sleep apnea, beckford snot uses CPAP, reports he has become little more active.
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
## 2022-08-22 ENCOUNTER — APPOINTMENT (OUTPATIENT)
Dept: RADIOLOGY | Facility: HOSPITAL | Age: 72
End: 2022-08-22

## 2022-11-14 ENCOUNTER — OUTPATIENT (OUTPATIENT)
Dept: OUTPATIENT SERVICES | Facility: HOSPITAL | Age: 72
LOS: 1 days | End: 2022-11-14
Payer: MEDICARE

## 2022-11-14 ENCOUNTER — TRANSCRIPTION ENCOUNTER (OUTPATIENT)
Age: 72
End: 2022-11-14

## 2022-11-14 VITALS
HEART RATE: 86 BPM | WEIGHT: 268.08 LBS | TEMPERATURE: 97 F | OXYGEN SATURATION: 100 % | SYSTOLIC BLOOD PRESSURE: 116 MMHG | DIASTOLIC BLOOD PRESSURE: 60 MMHG | HEIGHT: 71 IN | RESPIRATION RATE: 20 BRPM

## 2022-11-14 VITALS
DIASTOLIC BLOOD PRESSURE: 55 MMHG | RESPIRATION RATE: 20 BRPM | OXYGEN SATURATION: 97 % | HEART RATE: 77 BPM | SYSTOLIC BLOOD PRESSURE: 109 MMHG

## 2022-11-14 DIAGNOSIS — K22.719 BARRETT'S ESOPHAGUS WITH DYSPLASIA, UNSPECIFIED: ICD-10-CM

## 2022-11-14 DIAGNOSIS — K22.2 ESOPHAGEAL OBSTRUCTION: ICD-10-CM

## 2022-11-14 DIAGNOSIS — Z95.828 PRESENCE OF OTHER VASCULAR IMPLANTS AND GRAFTS: Chronic | ICD-10-CM

## 2022-11-14 DIAGNOSIS — Z98.890 OTHER SPECIFIED POSTPROCEDURAL STATES: Chronic | ICD-10-CM

## 2022-11-14 LAB — GLUCOSE BLDC GLUCOMTR-MCNC: 99 MG/DL — SIGNIFICANT CHANGE UP (ref 70–99)

## 2022-11-14 PROCEDURE — 43270 EGD LESION ABLATION: CPT

## 2022-11-14 PROCEDURE — C1886: CPT

## 2022-11-14 PROCEDURE — 82962 GLUCOSE BLOOD TEST: CPT

## 2022-11-14 DEVICE — NET RETRV ROT ROTH 2.5MMX230CM: Type: IMPLANTABLE DEVICE | Status: FUNCTIONAL

## 2022-11-14 DEVICE — CATH CHANNEL RFA ENDOSCOPSIC: Type: IMPLANTABLE DEVICE | Status: FUNCTIONAL

## 2022-11-14 RX ORDER — ONDANSETRON 8 MG/1
4 TABLET, FILM COATED ORAL ONCE
Refills: 0 | Status: DISCONTINUED | OUTPATIENT
Start: 2022-11-14 | End: 2022-11-29

## 2022-11-14 RX ORDER — SODIUM CHLORIDE 9 MG/ML
500 INJECTION INTRAMUSCULAR; INTRAVENOUS; SUBCUTANEOUS
Refills: 0 | Status: COMPLETED | OUTPATIENT
Start: 2022-11-14 | End: 2022-11-14

## 2022-11-14 RX ADMIN — SODIUM CHLORIDE 75 MILLILITER(S): 9 INJECTION INTRAMUSCULAR; INTRAVENOUS; SUBCUTANEOUS at 14:02

## 2022-11-14 NOTE — ASU PREOP CHECKLIST - AICD PRESENT
Spoke with patient and gave info. She states some time ago her ENT doctor stated something in that area was inflamed and she was given steroids. She asked who the neurosurgeon ones and I stated a general referral will be placed and they will contact her to schedule. She said okay.     Is there someone in specific?    no

## 2022-11-14 NOTE — PRE-ANESTHESIA EVALUATION ADULT - NSRADCARDRESULTSFT_GEN_ALL_CORE
Type of Study:     TTE procedure: ECHO TTE WO CON COMP W DOP, ECHO TTE WO CON FOLLOW UP LTD     BP: 118/66 mmHg     Study Location: 3NTechnical Quality: Technically Difficullt    Indications   1) R55 - Syncope and collapse    M-Mode Measurements (cm)     LVEDd: 5.61 cm            LVESd: 3.73 cm   IVSEd: 0.96 cm   LVPWd: 0.96 cm            AO Root Dimension: 4.2 cm                             ACS: 2.5 cm                             LA: 3.7 cm    Doppler Measurements:     AV Velocity:114 cm/s              MV Peak E-Wave: 70.1 cm/s   AV Peak Gradient: 5.2 mmHg        MV Peak A-Wave: 102 cm/s                                     MV E/A Ratio: 0.69 %   TR Velocity:216 cm/s              MV Peak Gradient: 1.97 mmHg   TR Gradient:18.6624 mmHg   Estimated RAP:5 mmHg   RVSP:23 mmHg     Findings     Mitral Valve   Mild mitral annular calcification is present.   Fibrocalcific changes noted to the mitral valve leaflets with preserved   leaflet excursion.   Mild mitral valve prolapse of the anterior leaflet is suggested.   Trace mitral regurgitation is present.   EA reversal of the mitral inflow consistent with reduced compliance of the   left ventricle.     Aortic Valve   Fibrocalcific changes noted to the Aortic valve leaflets with preserved   leaflet excursion.   Trace aortic regurgitation is present.     Tricuspid Valve   The tricuspid valve leaflets are thin and pliable; valve motion is normal.   Trace tricuspid valve regurgitation is present.     Pulmonic Valve   Pulmonic valve not well seen.     Left Atrium   The left atrium is borderline enlarged.     Left Ventricle   Estimated left ventricular ejection fraction is 60-65 %.     Right Atrium   The right atrium is not well seen.     Right Ventricle   The right ventricle appears mildly dilated in some views.     Pericardial Effusion   No evidence of pericardial effusion.     Pleural Effusion   No evidence of pleural effusion.     Miscellaneous   The IVC is not well seen.     Impression     Summary     Estimated left ventricular ejection fraction is 60-65 %.   The left atrium is borderline enlarged.   The right atrium is not well seen.   The right ventricle appears mildly dilated in some views.   Fibrocalcific changes noted to the Aortic valve leaflets with preserved   leaflet excursion.   Trace aortic regurgitation is present.   Mild mitral annular calcification is present.   Fibrocalcific changes noted to the mitral valve leaflets with preserved   leaflet excursion.   Mild mitral valve prolapse of the anterior leaflet is suggested.   Trace mitral regurgitation is present.   EA reversal of the mitral inflow consistent with reduced compliance of the   left ventricle.   The tricuspid valve leaflets are thin and pliable; valve motion is normal.   Trace tricuspid valve regurgitation is present.   Pulmonic valve not well seen.   The IVC is not well seen.

## 2022-11-14 NOTE — ASU DISCHARGE PLAN (ADULT/PEDIATRIC) - NS MD DC FALL RISK RISK
For information on Fall & Injury Prevention, visit: [email or web address] OR  [email or web address] OR  https://www.cdc.gov/steadi/patient.html

## 2022-11-14 NOTE — PRE PROCEDURE NOTE - PRE PROCEDURE EVALUATION
Attending Physician:     Jhon Lopez MD                       Procedure:  EGD RFA dilation    Indication: Hammonds's LGD s/p RFA, dysphagia now.  To reassess.      ________________________________________________________  PAST MEDICAL & SURGICAL HISTORY:  PE (pulmonary thromboembolism)      DVT (deep venous thrombosis)      DM (diabetes mellitus)      Diabetes insipidus      HLD (hyperlipidemia)      THOMAS (obstructive sleep apnea)      Hypothyroid      Intractable tension-type headache      Brain tumor      S/P craniotomy      S/P IVC filter        ALLERGIES:  No Known Allergies    HOME MEDICATIONS:  acetaminophen 325 mg oral tablet: 2 tab(s) orally every 6 hours, As needed, Mild Pain (1 - 3)  Aspirin Enteric Coated 81 mg oral delayed release tablet: 1 tab(s) orally once a day  atorvastatin 40 mg oral tablet: 1 tab(s) orally once a day  desmopressin 0.1 mg oral tablet: 1 tab(s) orally once a day (in the evening)  ferrous sulfate 325 mg (65 mg elemental iron) oral tablet: 1 tab(s) orally once a day  furosemide 20 mg oral tablet: 1 tab(s) orally once a day  hydrocortisone 5 mg oral tablet: 3 tab(s) orally once a day (in the morning)  hydrocortisone 5 mg oral tablet: 1 tab(s) orally once a day (in the evening)  levETIRAcetam 500 mg oral tablet, extended release: 1 tab(s) orally once a day (at bedtime)  levothyroxine 137 mcg (0.137 mg) oral tablet: 1 tab(s) orally once a day  metFORMIN 1000 mg oral tablet: 1 tab(s) orally 2 times a day  metoclopramide 10 mg oral tablet: 1 tab(s) orally once a day  pantoprazole 40 mg oral delayed release tablet: 1 tab(s) orally once a day  potassium chloride 10 mEq oral capsule, extended release: 1 cap(s) orally once a day  tamsulosin 0.4 mg oral capsule: 2 cap(s) orally once a day (at bedtime)  topiramate 50 mg oral tablet: 2 tab(s) orally once a day (at bedtime)  topiramate 50 mg oral tablet: 1 tab(s) orally once a day (in the morning) Need to follow up with neuro in 1 week to discuss if need to increase am dose to 2 tabs   Trulicity Pen 1.5 mg/0.5 mL subcutaneous solution: Inject 1.5mg subcutaneously once a week on Mon  venlafaxine 150 mg oral capsule, extended release: 1 cap(s) orally once a day  Vitamin D3 5000 intl units (125 mcg) oral tablet: 1 tab(s) orally once a day  Xarelto 20 mg oral tablet: 1 tab(s) orally once a day (in the evening)  Xyosted 75 mg/0.5 mL subcutaneous solution: Inject 75mg subcutaneously once a week on Thursday    AICD/PPM: [ ] yes   [x ] no    PERTINENT LAB DATA:                      PHYSICAL EXAMINATION:    Height (cm): 180.3  Weight (kg): 121.6  BMI (kg/m2): 37.4  BSA (m2): 2.39T(C): 36.1  HR: 86  BP: 116/60  RR: 20  SpO2: 100%    Constitutional: NAD  HEENT: PERRLA, EOMI,    Neck:  No JVD  Respiratory: CTAB/L  Cardiovascular: S1 and S2  Gastrointestinal: BS+, soft, NT/ND  Extremities: No peripheral edema  Neurological: A/O x 3, no focal deficits  Psychiatric: Normal mood, normal affect  Skin: No rashes    ASA Class: I [ ]  II [ ]  III [x ]  IV [ ]    COMMENTS:    The patient is a suitable candidate for the planned procedure unless box checked [ ]  No, explain:

## 2022-11-14 NOTE — ASU PREOP CHECKLIST - HEIGHT IN CM
Refill passed per [facility], [facility] protocol.   Cholesterol Medications  Protocol Criteria:  · Appointment scheduled in the past 12 months or in the next 3 months  · ALT & LDL on file in the past 12 months  · ALT result < 80  · LDL result <130   Recent Outpat 180.34

## 2022-11-14 NOTE — ASU PATIENT PROFILE, ADULT - DOES PATIENT HAVE ADVANCE DIRECTIVE
Patient:  Sarah Beth England   YOB: 1966  Date of Visit: 2/3/2022      Dear Kirsten Bear MD  [address]   [address] [facility] [address]  Via In MD Mayank Zhao [id number]  Via In Basket: Thank you for referring Ms. Thompson Terry to me for evaluation/treatment. Below are the relevant portions of my assessment and plan of care. Ms. Ro Salmon is a 56 yo F with history of tobacco use, quit a few years ago, mixed hyperlipidemia, here for preop cardiac evaluation prior to right knee surgery with Dr. Keanu Cuenca. She did have an echocardiogram in 11/2021 for a cardiac murmur that did show preserved LV function and mild aortic regurgitation and we discussed the results. From a cardiac standpoint, she denies any exertional chest pain. Her breathing has been stable. No significant palpitations, lightheadedness or dizziness. No prior cardiac history. She is compensated on exam with clear lungs, I/VI diastolic murmur at the left sternal border. Soc hx. Quit tobacco 3-4 yrs ago. Fam hx. No early CAD  Assessment and Plan:   1. Preop cardiac evaluation. She is stable cardiac wise and low risk for cardiac complications. Will send a preop report to Dr. La Burnett and Dr. Keanu Cuenca. 2. Aortic insufficiency. This is to a mild degree and consistent with her murmur. As this is mild, no additional cardiac evaluation is indicated and no specific surveillance of this is indicated, other than recommend at least an annual followup with her primary care physician. If she has progression of her heart murmur or symptoms, could always repeat an echocardiogram, but this can be done on an as needed basis. 3. Tobacco use. Quit a few years ago. If you have questions, please do not hesitate to call me.       Sincerely,      Geo Baum MD No

## 2022-11-14 NOTE — ASU PATIENT PROFILE, ADULT - FALL HARM RISK - UNIVERSAL INTERVENTIONS
Bed in lowest position, wheels locked, appropriate side rails in place/Call bell, personal items and telephone in reach/Instruct patient to call for assistance before getting out of bed or chair/Non-slip footwear when patient is out of bed/[place] to call system/Physically safe environment - no spills, clutter or unnecessary equipment/Purposeful Proactive Rounding/Room/bathroom lighting operational, light cord in reach

## 2022-12-20 ENCOUNTER — APPOINTMENT (OUTPATIENT)
Dept: NEUROLOGY | Facility: CLINIC | Age: 72
End: 2022-12-20

## 2022-12-20 ENCOUNTER — NON-APPOINTMENT (OUTPATIENT)
Age: 72
End: 2022-12-20

## 2022-12-20 VITALS
DIASTOLIC BLOOD PRESSURE: 70 MMHG | TEMPERATURE: 97.8 F | HEIGHT: 71 IN | HEART RATE: 89 BPM | BODY MASS INDEX: 37.1 KG/M2 | WEIGHT: 265 LBS | SYSTOLIC BLOOD PRESSURE: 119 MMHG

## 2022-12-20 PROCEDURE — 99214 OFFICE O/P EST MOD 30 MIN: CPT

## 2022-12-20 RX ORDER — SUCRALFATE 1 G/1
1 TABLET ORAL 4 TIMES DAILY
Refills: 0 | Status: ACTIVE | COMMUNITY

## 2022-12-20 RX ORDER — METOPROLOL SUCCINATE 25 MG/1
25 TABLET, EXTENDED RELEASE ORAL DAILY
Refills: 0 | Status: ACTIVE | COMMUNITY

## 2022-12-20 RX ORDER — FUROSEMIDE 20 MG/1
20 TABLET ORAL
Refills: 0 | Status: DISCONTINUED | COMMUNITY
End: 2022-12-20

## 2022-12-20 RX ORDER — LEVOTHYROXINE SODIUM 0.15 MG/1
150 TABLET ORAL
Refills: 0 | Status: ACTIVE | COMMUNITY

## 2022-12-20 RX ORDER — INSULIN DEGLUDEC INJECTION 100 U/ML
100 INJECTION, SOLUTION SUBCUTANEOUS
Refills: 0 | Status: DISCONTINUED | COMMUNITY
End: 2022-12-20
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
## 2022-12-20 NOTE — HISTORY OF PRESENT ILLNESS
[FreeTextEntry1] : Pt is here for a follow-up visit today, last seen on 4/18/22. Pt has noted remarkable improvement of headaches, gets occasional headaches 1-2 per month, mild in nature, is using Ajovy SC injections monthly, is also taking Migrelief one capsule daily.\par \par Pt has not had any seizure like episodes,  he is taking Topiramate 200 mg b.i.d., in addition to Depakote 500 mg daily.\par \par Pt has history of mood disorder, uses Effexor, has sleep apnea, does not uses CPAP.\par \par Pt is stable otherwise.

## 2022-12-20 NOTE — DATA REVIEWED
[de-identified] : 2/24/21: EEG shows focal slowing in the right temporal and parietal region superimposed on generalized slowing, Potentially structural in nature, no epileptiform discharges or seizures.. [de-identified] : 2/10/20: CT head: Status post right craniotomy with underlying due to of thickening without significant change from prior study, no evidence of acute intracranial hemorrhage

## 2022-12-20 NOTE — PHYSICAL EXAM
[General Appearance - Alert] : alert [General Appearance - In No Acute Distress] : in no acute distress [Oriented To Time, Place, And Person] : oriented to person, place, and time [Impaired Insight] : insight and judgment were intact [Affect] : the affect was normal [Person] : oriented to person [Place] : oriented to place [Time] : oriented to time [Registration Intact] : recent registration memory intact [Concentration Intact] : normal concentrating ability [Naming Objects] : no difficulty naming common objects [Repeating Phrases] : no difficulty repeating a phrase [Fluency] : fluency intact [Comprehension] : comprehension intact [Past History] : adequate knowledge of personal past history [Cranial Nerves Optic (II)] : visual acuity intact bilaterally,  visual fields full to confrontation, pupils equal round and reactive to light [Cranial Nerves Oculomotor (III)] : extraocular motion intact [Cranial Nerves Trigeminal (V)] : facial sensation intact symmetrically [Cranial Nerves Facial (VII)] : face symmetrical [Cranial Nerves Glossopharyngeal (IX)] : tongue and palate midline [Cranial Nerves Vestibulocochlear (VIII)] : hearing was intact bilaterally [Cranial Nerves Accessory (XI - Cranial And Spinal)] : head turning and shoulder shrug symmetric [Cranial Nerves Hypoglossal (XII)] : there was no tongue deviation with protrusion [Motor Tone] : muscle tone was normal in all four extremities [Motor Strength] : muscle strength was normal in all four extremities [No Muscle Atrophy] : normal bulk in all four extremities [Sensation Tactile Decrease] : light touch was intact [Limited Balance] : the patient's balance was impaired [Tremor] : a tremor present [2+] : Patella left 2+ [1+] : Ankle jerk right 1+ [PERRL With Normal Accommodation] : pupils were equal in size, round, reactive to light, with normal accommodation [Extraocular Movements] : extraocular movements were intact [Full Visual Field] : full visual field [] : the neck was supple [Neck Cervical Mass (___cm)] : no neck mass was observed [Auscultation Breath Sounds / Voice Sounds] : lungs were clear to auscultation bilaterally [Heart Rate And Rhythm] : heart rate was normal and rhythm regular [Heart Sounds] : normal S1 and S2 [Arterial Pulses Carotid] : carotid pulses were normal with no bruits [Edema] : there was no peripheral edema [Short Term Intact] : short term memory impaired [Past-pointing] : there was no past-pointing [Dysdiadochokinesia Bilaterally] : not present [Coordination - Dysmetria Impaired Finger-to-Nose Bilateral] : not present [Plantar Reflex Right Only] : normal on the right [Plantar Reflex Left Only] : normal on the left [FreeTextEntry6] : Left foot in splint [Papilledema Of Both Eyes] : no papilledema

## 2023-01-13 ENCOUNTER — RX ONLY (RX ONLY)
Age: 73
End: 2023-01-13

## 2023-01-13 ENCOUNTER — OFFICE (OUTPATIENT)
Dept: URBAN - METROPOLITAN AREA CLINIC 104 | Facility: CLINIC | Age: 73
Setting detail: OPHTHALMOLOGY
End: 2023-01-13
Payer: MEDICARE

## 2023-01-13 DIAGNOSIS — H01.005: ICD-10-CM

## 2023-01-13 DIAGNOSIS — H01.002: ICD-10-CM

## 2023-01-13 DIAGNOSIS — H01.004: ICD-10-CM

## 2023-01-13 DIAGNOSIS — H16.223: ICD-10-CM

## 2023-01-13 DIAGNOSIS — H25.13: ICD-10-CM

## 2023-01-13 DIAGNOSIS — Z79.84: ICD-10-CM

## 2023-01-13 DIAGNOSIS — E11.9: ICD-10-CM

## 2023-01-13 DIAGNOSIS — H01.001: ICD-10-CM

## 2023-01-13 DIAGNOSIS — E11.3293: ICD-10-CM

## 2023-01-13 PROCEDURE — 92014 COMPRE OPH EXAM EST PT 1/>: CPT | Performed by: OPTOMETRIST

## 2023-01-13 ASSESSMENT — LID EXAM ASSESSMENTS
OS_BLEPHARITIS: LLL LUL 2+ 3+
OD_BLEPHARITIS: RLL RUL 2+ 3+

## 2023-01-13 ASSESSMENT — REFRACTION_MANIFEST
OS_AXIS: 060
OS_VA1: 20/40
OS_SPHERE: +2.25
OD_AXIS: 035
OS_CYLINDER: -1.50
OD_CYLINDER: -0.25
OD_SPHERE: +3.50
OD_VA1: 20/40

## 2023-01-13 ASSESSMENT — REFRACTION_AUTOREFRACTION
OD_AXIS: 036
OD_SPHERE: +3.50
OS_AXIS: 060
OS_CYLINDER: -1.50
OS_SPHERE: +2.25
OD_CYLINDER: -0.25

## 2023-01-13 ASSESSMENT — SPHEQUIV_DERIVED
OD_SPHEQUIV: 3.375
OS_SPHEQUIV: 1.5
OD_SPHEQUIV: 3.375
OS_SPHEQUIV: 1.5

## 2023-01-13 ASSESSMENT — AXIALLENGTH_DERIVED
OD_AL: 5.55
OS_AL: 22.7759
OS_AL: 22.7759
OD_AL: 5.55

## 2023-01-13 ASSESSMENT — KERATOMETRY
OS_AXISANGLE_DEGREES: 079
OS_K2POWER_DIOPTERS: 44.70
OD_K1POWER_DIOPTERS: 42.99
OS_K1POWER_DIOPTERS: 43.72
OD_K2POWER_DIOPTERS: 454.53
OD_AXISANGLE_DEGREES: 096

## 2023-01-13 ASSESSMENT — SUPERFICIAL PUNCTATE KERATITIS (SPK)
OS_SPK: 2+
OD_SPK: 2+

## 2023-01-13 ASSESSMENT — REFRACTION_CURRENTRX
OD_CYLINDER: -0.25
OS_SPHERE: +3.50
OD_OVR_VA: 20/
OS_OVR_VA: 20/
OS_ADD: +2.25
OS_VPRISM_DIRECTION: PROGS
OD_ADD: +2.25
OD_VPRISM_DIRECTION: PROGS
OD_SPHERE: +4.25
OD_AXIS: 180

## 2023-01-13 ASSESSMENT — TONOMETRY
OS_IOP_MMHG: 16
OD_IOP_MMHG: 16

## 2023-01-13 ASSESSMENT — CONFRONTATIONAL VISUAL FIELD TEST (CVF)
OS_FINDINGS: FULL
OD_FINDINGS: FULL

## 2023-01-13 ASSESSMENT — VISUAL ACUITY
OD_BCVA: 20/40
OS_BCVA: 20/40

## 2023-04-04 ENCOUNTER — OFFICE (OUTPATIENT)
Dept: URBAN - METROPOLITAN AREA CLINIC 104 | Facility: CLINIC | Age: 73
Setting detail: OPHTHALMOLOGY
End: 2023-04-04
Payer: MEDICARE

## 2023-04-04 DIAGNOSIS — H25.11: ICD-10-CM

## 2023-04-04 DIAGNOSIS — E11.9: ICD-10-CM

## 2023-04-04 DIAGNOSIS — H25.12: ICD-10-CM

## 2023-04-04 DIAGNOSIS — H18.413: ICD-10-CM

## 2023-04-04 DIAGNOSIS — H01.004: ICD-10-CM

## 2023-04-04 DIAGNOSIS — H01.001: ICD-10-CM

## 2023-04-04 DIAGNOSIS — H25.13: ICD-10-CM

## 2023-04-04 DIAGNOSIS — H04.123: ICD-10-CM

## 2023-04-04 DIAGNOSIS — H01.002: ICD-10-CM

## 2023-04-04 DIAGNOSIS — Z79.84: ICD-10-CM

## 2023-04-04 DIAGNOSIS — H01.005: ICD-10-CM

## 2023-04-04 PROCEDURE — 92136 OPHTHALMIC BIOMETRY: CPT | Performed by: SPECIALIST

## 2023-04-04 PROCEDURE — 92014 COMPRE OPH EXAM EST PT 1/>: CPT | Performed by: SPECIALIST

## 2023-04-04 ASSESSMENT — SPHEQUIV_DERIVED
OS_SPHEQUIV: 2.875
OD_SPHEQUIV: 3.75
OS_SPHEQUIV: 2.25
OD_SPHEQUIV: 3.375

## 2023-04-04 ASSESSMENT — CONFRONTATIONAL VISUAL FIELD TEST (CVF)
OD_FINDINGS: FULL
OS_FINDINGS: FULL

## 2023-04-04 ASSESSMENT — KERATOMETRY
OS_K1K2_AVERAGE: 44.21
OS_CYLAXISANGLE_DEGREES: 079
OD_K1POWER_DIOPTERS: 43.05
OD_AXISANGLE2_DEGREES: 096
OD_K2POWER_DIOPTERS: 454.53
OD_CYLAXISANGLE_DEGREES: 096
OS_K1POWER_DIOPTERS: 43.60
OS_CYLPOWER_DEGREES: 0.98
OD_AXISANGLE_DEGREES: 20
OD_CYLPOWER_DEGREES: 411.54
OD_K1K2_AVERAGE: 248.76
OS_AXISANGLE_DEGREES: 48
OD_K2POWER_DIOPTERS: 43.32
OD_AXISANGLE_DEGREES: 6
OS_AXISANGLE2_DEGREES: 079
OS_AXISANGLE_DEGREES: 169
OS_K2POWER_DIOPTERS: 43.89
OS_K2POWER_DIOPTERS: 44.70
OD_K1POWER_DIOPTERS: 42.99
OS_K1POWER_DIOPTERS: 43.72

## 2023-04-04 ASSESSMENT — REFRACTION_CURRENTRX
OD_CYLINDER: -0.25
OD_SPHERE: +3.00
OD_ADD: +2.00
OS_AXIS: 179
OD_AXIS: 165
OS_SPHERE: +2.25
OS_ADD: +2.00
OD_OVR_VA: 20/
OS_CYLINDER: -0.25
OS_OVR_VA: 20/

## 2023-04-04 ASSESSMENT — VISUAL ACUITY
OS_BCVA: 20/40
OD_BCVA: 20/40

## 2023-04-04 ASSESSMENT — AXIALLENGTH_DERIVED
OD_AL: 5.54
OS_AL: 22.51
OD_AL: 5.55
OS_AL: 22.29

## 2023-04-04 ASSESSMENT — LID EXAM ASSESSMENTS
OS_BLEPHARITIS: LLL LUL 3+
OD_BLEPHARITIS: RLL RUL 3+

## 2023-04-04 ASSESSMENT — REFRACTION_MANIFEST
OD_VA1: 20/40
OS_SPHERE: +3.00
OS_AXIS: 180
OD_CYLINDER: -0.25
OS_CYLINDER: -0.25
OD_AXIS: 165
OD_SPHERE: +3.50
OS_VA1: 20/40

## 2023-04-04 ASSESSMENT — REFRACTION_AUTOREFRACTION
OD_SPHERE: +4.00
OS_CYLINDER: -1.50
OD_CYLINDER: -0.50
OS_SPHERE: +3.00
OD_AXIS: 73
OS_AXIS: 81

## 2023-04-04 ASSESSMENT — TONOMETRY
OD_IOP_MMHG: 12
OS_IOP_MMHG: 12

## 2023-04-04 ASSESSMENT — TEAR BREAK UP TIME (TBUT)
OD_TBUT: 1+
OS_TBUT: 1+

## 2023-04-21 ENCOUNTER — ASC (OUTPATIENT)
Dept: URBAN - METROPOLITAN AREA SURGERY 8 | Facility: SURGERY | Age: 73
Setting detail: OPHTHALMOLOGY
End: 2023-04-21
Payer: MEDICARE

## 2023-04-21 ENCOUNTER — RX ONLY (RX ONLY)
Age: 73
End: 2023-04-21

## 2023-04-21 DIAGNOSIS — H25.11: ICD-10-CM

## 2023-04-21 PROCEDURE — 66984 XCAPSL CTRC RMVL W/O ECP: CPT | Performed by: SPECIALIST

## 2023-04-22 ENCOUNTER — OFFICE (OUTPATIENT)
Dept: URBAN - METROPOLITAN AREA CLINIC 104 | Facility: CLINIC | Age: 73
Setting detail: OPHTHALMOLOGY
End: 2023-04-22
Payer: MEDICARE

## 2023-04-22 DIAGNOSIS — Z96.1: ICD-10-CM

## 2023-04-22 PROCEDURE — 99024 POSTOP FOLLOW-UP VISIT: CPT | Performed by: OPTOMETRIST

## 2023-04-22 ASSESSMENT — LID EXAM ASSESSMENTS
OD_BLEPHARITIS: RLL RUL 3+
OS_BLEPHARITIS: LLL LUL 3+

## 2023-04-22 ASSESSMENT — VISUAL ACUITY
OS_BCVA: 20/50
OD_BCVA: 20/

## 2023-04-22 ASSESSMENT — TEAR BREAK UP TIME (TBUT)
OD_TBUT: 1+
OS_TBUT: 1+

## 2023-04-22 ASSESSMENT — REFRACTION_CURRENTRX
OD_OVR_VA: 20/
OS_OVR_VA: 20/

## 2023-04-22 ASSESSMENT — CONFRONTATIONAL VISUAL FIELD TEST (CVF)
OD_FINDINGS: FULL
OS_FINDINGS: FULL

## 2023-04-22 ASSESSMENT — TONOMETRY: OD_IOP_MMHG: 18

## 2023-04-26 ENCOUNTER — OFFICE (OUTPATIENT)
Dept: URBAN - METROPOLITAN AREA CLINIC 104 | Facility: CLINIC | Age: 73
Setting detail: OPHTHALMOLOGY
End: 2023-04-26
Payer: MEDICARE

## 2023-04-26 DIAGNOSIS — H25.12: ICD-10-CM

## 2023-04-26 DIAGNOSIS — Z96.1: ICD-10-CM

## 2023-04-26 PROCEDURE — 92136 OPHTHALMIC BIOMETRY: CPT | Performed by: SPECIALIST

## 2023-04-26 PROCEDURE — 99024 POSTOP FOLLOW-UP VISIT: CPT | Performed by: OPTOMETRIST

## 2023-04-26 ASSESSMENT — REFRACTION_AUTOREFRACTION
OD_CYLINDER: -1.50
OS_SPHERE: +2.75
OS_CYLINDER: -1.00
OD_AXIS: 011
OD_AXIS: 011
OS_SPHERE: +2.75
OS_CYLINDER: -1.00
OD_SPHERE: +0.25
OS_AXIS: 073
OS_AXIS: 073
OD_SPHERE: +0.25
OD_CYLINDER: -1.50

## 2023-04-26 ASSESSMENT — KERATOMETRY
OD_AXISANGLE_DEGREES: 097
OS_K1POWER_DIOPTERS: 43.66
OD_K2POWER_DIOPTERS: 44.00
OS_K2POWER_DIOPTERS: 43.83
OS_AXISANGLE_DEGREES: 111
OS_K1POWER_DIOPTERS: 43.66
OD_AXISANGLE_DEGREES: 097
OD_K2POWER_DIOPTERS: 44.00
OS_AXISANGLE_DEGREES: 111
OS_K2POWER_DIOPTERS: 43.83
OD_K1POWER_DIOPTERS: 43.27
OD_K1POWER_DIOPTERS: 43.27

## 2023-04-26 ASSESSMENT — AXIALLENGTH_DERIVED
OS_AL: 22.6633
OS_AL: 22.6633
OD_AL: 23.738
OD_AL: 23.738

## 2023-04-26 ASSESSMENT — CONFRONTATIONAL VISUAL FIELD TEST (CVF)
OD_FINDINGS: FULL
OS_FINDINGS: FULL

## 2023-04-26 ASSESSMENT — VISUAL ACUITY
OS_BCVA: 20/40
OS_BCVA: 20/40
OD_BCVA: 20/
OD_BCVA: 20/40

## 2023-04-26 ASSESSMENT — SPHEQUIV_DERIVED
OS_SPHEQUIV: 2.25
OD_SPHEQUIV: -0.5
OS_SPHEQUIV: 2.25
OD_SPHEQUIV: -0.5

## 2023-04-26 ASSESSMENT — LID EXAM ASSESSMENTS
OD_BLEPHARITIS: RLL RUL 3+
OS_BLEPHARITIS: LLL LUL 3+

## 2023-04-26 ASSESSMENT — TEAR BREAK UP TIME (TBUT)
OS_TBUT: 1+
OD_TBUT: 1+

## 2023-04-26 ASSESSMENT — TONOMETRY: OD_IOP_MMHG: 12

## 2023-04-28 ENCOUNTER — RX ONLY (RX ONLY)
Age: 73
End: 2023-04-28

## 2023-04-28 ENCOUNTER — ASC (OUTPATIENT)
Dept: URBAN - METROPOLITAN AREA SURGERY 8 | Facility: SURGERY | Age: 73
Setting detail: OPHTHALMOLOGY
End: 2023-04-28
Payer: MEDICARE

## 2023-04-28 DIAGNOSIS — H25.12: ICD-10-CM

## 2023-04-28 PROCEDURE — 66984 XCAPSL CTRC RMVL W/O ECP: CPT | Performed by: SPECIALIST

## 2023-04-29 ENCOUNTER — OFFICE (OUTPATIENT)
Dept: URBAN - METROPOLITAN AREA CLINIC 104 | Facility: CLINIC | Age: 73
Setting detail: OPHTHALMOLOGY
End: 2023-04-29
Payer: MEDICARE

## 2023-04-29 DIAGNOSIS — Z96.1: ICD-10-CM

## 2023-04-29 PROCEDURE — 99024 POSTOP FOLLOW-UP VISIT: CPT | Performed by: OPTOMETRIST

## 2023-04-29 ASSESSMENT — KERATOMETRY
OS_K1POWER_DIOPTERS: 43.83
OD_AXISANGLE_DEGREES: 097
OS_AXISANGLE_DEGREES: 049
OD_K1POWER_DIOPTERS: 43.27
OD_K2POWER_DIOPTERS: 44.00
OS_K2POWER_DIOPTERS: 44.18

## 2023-04-29 ASSESSMENT — CONFRONTATIONAL VISUAL FIELD TEST (CVF)
OS_FINDINGS: FULL
OD_FINDINGS: FULL

## 2023-04-29 ASSESSMENT — REFRACTION_AUTOREFRACTION
OD_AXIS: 011
OS_SPHERE: +0.25
OS_AXIS: 130
OD_SPHERE: +0.25
OS_CYLINDER: -0.50
OD_CYLINDER: -1.50

## 2023-04-29 ASSESSMENT — VISUAL ACUITY
OS_BCVA: 20/40
OD_BCVA: 20/40

## 2023-04-29 ASSESSMENT — AXIALLENGTH_DERIVED
OS_AL: 23.4079
OD_AL: 23.738

## 2023-04-29 ASSESSMENT — SPHEQUIV_DERIVED
OS_SPHEQUIV: 0
OD_SPHEQUIV: -0.5

## 2023-04-29 ASSESSMENT — LID EXAM ASSESSMENTS
OS_BLEPHARITIS: LLL LUL 3+
OD_BLEPHARITIS: RLL RUL 3+

## 2023-04-29 ASSESSMENT — TEAR BREAK UP TIME (TBUT)
OD_TBUT: 1+
OS_TBUT: 1+

## 2023-04-29 ASSESSMENT — TONOMETRY: OS_IOP_MMHG: 14

## 2023-05-03 ENCOUNTER — OFFICE (OUTPATIENT)
Dept: URBAN - METROPOLITAN AREA CLINIC 104 | Facility: CLINIC | Age: 73
Setting detail: OPHTHALMOLOGY
End: 2023-05-03
Payer: MEDICARE

## 2023-05-03 DIAGNOSIS — Z96.1: ICD-10-CM

## 2023-05-03 PROBLEM — H18.413 ARCUS SENILIS; BOTH EYES: Status: ACTIVE | Noted: 2023-04-04

## 2023-05-03 PROBLEM — H25.12 CATARACT SENILE NUCLEAR SCLEROSIS; LEFT EYE,: Status: ACTIVE | Noted: 2023-04-04

## 2023-05-03 PROCEDURE — 99024 POSTOP FOLLOW-UP VISIT: CPT | Performed by: OPTOMETRIST

## 2023-05-03 ASSESSMENT — REFRACTION_AUTOREFRACTION
OD_AXIS: 011
OD_SPHERE: +0.25
OS_AXIS: 130
OS_CYLINDER: -0.50
OD_CYLINDER: -1.50
OS_SPHERE: +0.25

## 2023-05-03 ASSESSMENT — KERATOMETRY
OS_K2POWER_DIOPTERS: 44.18
OS_AXISANGLE_DEGREES: 049
OD_K2POWER_DIOPTERS: 44.00
OD_K1POWER_DIOPTERS: 43.27
OS_K1POWER_DIOPTERS: 43.83
OD_AXISANGLE_DEGREES: 097

## 2023-05-03 ASSESSMENT — SPHEQUIV_DERIVED
OD_SPHEQUIV: -0.5
OS_SPHEQUIV: 0

## 2023-05-03 ASSESSMENT — VISUAL ACUITY
OD_BCVA: 20/40-2
OS_BCVA: 20/30

## 2023-05-03 ASSESSMENT — LID EXAM ASSESSMENTS
OS_BLEPHARITIS: LLL LUL 3+
OD_BLEPHARITIS: RLL RUL 3+

## 2023-05-03 ASSESSMENT — CONFRONTATIONAL VISUAL FIELD TEST (CVF)
OS_FINDINGS: FULL
OD_FINDINGS: FULL

## 2023-05-03 ASSESSMENT — AXIALLENGTH_DERIVED
OD_AL: 23.738
OS_AL: 23.4079

## 2023-05-03 ASSESSMENT — TONOMETRY: OS_IOP_MMHG: 12

## 2023-05-08 ENCOUNTER — RX RENEWAL (OUTPATIENT)
Age: 73
End: 2023-05-08

## 2023-05-24 ENCOUNTER — OFFICE (OUTPATIENT)
Dept: URBAN - METROPOLITAN AREA CLINIC 104 | Facility: CLINIC | Age: 73
Setting detail: OPHTHALMOLOGY
End: 2023-05-24
Payer: MEDICARE

## 2023-05-24 DIAGNOSIS — Z96.1: ICD-10-CM

## 2023-05-24 PROBLEM — H16.222 DRY EYE SYNDROME K SICCA;  , LEFT EYE: Status: ACTIVE | Noted: 2023-05-24

## 2023-05-24 PROCEDURE — 99024 POSTOP FOLLOW-UP VISIT: CPT | Performed by: OPTOMETRIST

## 2023-05-24 ASSESSMENT — SPHEQUIV_DERIVED
OS_SPHEQUIV: 0.5
OD_SPHEQUIV: 0.5

## 2023-05-24 ASSESSMENT — CONFRONTATIONAL VISUAL FIELD TEST (CVF)
OD_FINDINGS: FULL
OS_FINDINGS: FULL

## 2023-05-24 ASSESSMENT — REFRACTION_MANIFEST
OD_ADD: +2.50
OS_ADD: +2.50

## 2023-05-24 ASSESSMENT — KERATOMETRY
OS_K2POWER_DIOPTERS: 44.12
OD_K2POWER_DIOPTERS: 44.23
OD_AXISANGLE_DEGREES: 091
OD_K1POWER_DIOPTERS: 43.05
OS_K1POWER_DIOPTERS: 43.83
OS_AXISANGLE_DEGREES: 066

## 2023-05-24 ASSESSMENT — REFRACTION_AUTOREFRACTION
OS_CYLINDER: -1.50
OS_SPHERE: +1.25
OS_AXIS: 093
OD_AXIS: 177
OD_SPHERE: +0.75
OD_CYLINDER: -0.50

## 2023-05-24 ASSESSMENT — LID EXAM ASSESSMENTS
OD_BLEPHARITIS: RLL RUL 3+
OS_BLEPHARITIS: LLL LUL 3+

## 2023-05-24 ASSESSMENT — AXIALLENGTH_DERIVED
OD_AL: 23.3482
OS_AL: 23.2283

## 2023-05-24 ASSESSMENT — TONOMETRY
OD_IOP_MMHG: 16
OS_IOP_MMHG: 16

## 2023-05-24 ASSESSMENT — SUPERFICIAL PUNCTATE KERATITIS (SPK): OS_SPK: 1+ 2+

## 2023-05-24 ASSESSMENT — VISUAL ACUITY
OS_BCVA: 20/25-2
OD_BCVA: 20/25-2

## 2023-06-12 ENCOUNTER — EMERGENCY (EMERGENCY)
Facility: HOSPITAL | Age: 73
LOS: 1 days | Discharge: HOME | End: 2023-06-12
Attending: EMERGENCY MEDICINE
Payer: MEDICARE

## 2023-06-12 VITALS
TEMPERATURE: 98 F | DIASTOLIC BLOOD PRESSURE: 74 MMHG | OXYGEN SATURATION: 96 % | HEART RATE: 66 BPM | RESPIRATION RATE: 16 BRPM | SYSTOLIC BLOOD PRESSURE: 157 MMHG

## 2023-06-12 VITALS
HEART RATE: 72 BPM | RESPIRATION RATE: 18 BRPM | HEIGHT: 71 IN | SYSTOLIC BLOOD PRESSURE: 107 MMHG | WEIGHT: 258.6 LBS | OXYGEN SATURATION: 95 % | DIASTOLIC BLOOD PRESSURE: 69 MMHG | TEMPERATURE: 98 F

## 2023-06-12 DIAGNOSIS — Z98.890 OTHER SPECIFIED POSTPROCEDURAL STATES: Chronic | ICD-10-CM

## 2023-06-12 DIAGNOSIS — Z95.828 PRESENCE OF OTHER VASCULAR IMPLANTS AND GRAFTS: Chronic | ICD-10-CM

## 2023-06-12 LAB
ACETONE SERPL-MCNC: NEGATIVE — SIGNIFICANT CHANGE UP
ALBUMIN SERPL ELPH-MCNC: 4 G/DL — SIGNIFICANT CHANGE UP (ref 3.3–5.2)
ALP SERPL-CCNC: 94 U/L — SIGNIFICANT CHANGE UP (ref 40–120)
ALT FLD-CCNC: 48 U/L — HIGH
ANION GAP SERPL CALC-SCNC: 11 MMOL/L — SIGNIFICANT CHANGE UP (ref 5–17)
APPEARANCE UR: CLEAR — SIGNIFICANT CHANGE UP
AST SERPL-CCNC: 21 U/L — SIGNIFICANT CHANGE UP
BASE EXCESS BLDV CALC-SCNC: -2.3 MMOL/L — LOW (ref -2–3)
BASE EXCESS BLDV CALC-SCNC: -3.1 MMOL/L — LOW (ref -2–3)
BASOPHILS # BLD AUTO: 0.04 K/UL — SIGNIFICANT CHANGE UP (ref 0–0.2)
BASOPHILS NFR BLD AUTO: 0.9 % — SIGNIFICANT CHANGE UP (ref 0–2)
BILIRUB SERPL-MCNC: 0.4 MG/DL — SIGNIFICANT CHANGE UP (ref 0.4–2)
BILIRUB UR-MCNC: NEGATIVE — SIGNIFICANT CHANGE UP
BUN SERPL-MCNC: 18.8 MG/DL — SIGNIFICANT CHANGE UP (ref 8–20)
CA-I SERPL-SCNC: 1.26 MMOL/L — SIGNIFICANT CHANGE UP (ref 1.15–1.33)
CA-I SERPL-SCNC: 1.34 MMOL/L — HIGH (ref 1.15–1.33)
CALCIUM SERPL-MCNC: 10 MG/DL — SIGNIFICANT CHANGE UP (ref 8.4–10.5)
CHLORIDE BLDV-SCNC: 109 MMOL/L — HIGH (ref 96–108)
CHLORIDE BLDV-SCNC: 110 MMOL/L — HIGH (ref 96–108)
CHLORIDE SERPL-SCNC: 109 MMOL/L — HIGH (ref 96–108)
CO2 SERPL-SCNC: 26 MMOL/L — SIGNIFICANT CHANGE UP (ref 22–29)
COLOR SPEC: YELLOW — SIGNIFICANT CHANGE UP
CREAT SERPL-MCNC: 1.49 MG/DL — HIGH (ref 0.5–1.3)
DIFF PNL FLD: NEGATIVE — SIGNIFICANT CHANGE UP
EGFR: 50 ML/MIN/1.73M2 — LOW
EOSINOPHIL # BLD AUTO: 0.15 K/UL — SIGNIFICANT CHANGE UP (ref 0–0.5)
EOSINOPHIL NFR BLD AUTO: 3.4 % — SIGNIFICANT CHANGE UP (ref 0–6)
GAS PNL BLDV: 142 MMOL/L — SIGNIFICANT CHANGE UP (ref 136–145)
GAS PNL BLDV: 143 MMOL/L — SIGNIFICANT CHANGE UP (ref 136–145)
GAS PNL BLDV: SIGNIFICANT CHANGE UP
GAS PNL BLDV: SIGNIFICANT CHANGE UP
GLUCOSE BLDV-MCNC: 309 MG/DL — HIGH (ref 70–99)
GLUCOSE BLDV-MCNC: 351 MG/DL — HIGH (ref 70–99)
GLUCOSE SERPL-MCNC: 339 MG/DL — HIGH (ref 70–99)
GLUCOSE UR QL: 1000 MG/DL
HCO3 BLDV-SCNC: 24 MMOL/L — SIGNIFICANT CHANGE UP (ref 22–29)
HCO3 BLDV-SCNC: 25 MMOL/L — SIGNIFICANT CHANGE UP (ref 22–29)
HCT VFR BLD CALC: 47.7 % — SIGNIFICANT CHANGE UP (ref 39–50)
HCT VFR BLDA CALC: 47 % — SIGNIFICANT CHANGE UP
HCT VFR BLDA CALC: 50 % — SIGNIFICANT CHANGE UP
HGB BLD CALC-MCNC: 15.5 G/DL — SIGNIFICANT CHANGE UP (ref 12.6–17.4)
HGB BLD CALC-MCNC: 16.5 G/DL — SIGNIFICANT CHANGE UP (ref 12.6–17.4)
HGB BLD-MCNC: 16 G/DL — SIGNIFICANT CHANGE UP (ref 13–17)
IMM GRANULOCYTES NFR BLD AUTO: 1.4 % — HIGH (ref 0–0.9)
KETONES UR-MCNC: NEGATIVE — SIGNIFICANT CHANGE UP
LACTATE BLDV-MCNC: 1.1 MMOL/L — SIGNIFICANT CHANGE UP (ref 0.5–2)
LACTATE BLDV-MCNC: 2.5 MMOL/L — HIGH (ref 0.5–2)
LEUKOCYTE ESTERASE UR-ACNC: NEGATIVE — SIGNIFICANT CHANGE UP
LYMPHOCYTES # BLD AUTO: 1.54 K/UL — SIGNIFICANT CHANGE UP (ref 1–3.3)
LYMPHOCYTES # BLD AUTO: 35.3 % — SIGNIFICANT CHANGE UP (ref 13–44)
MCHC RBC-ENTMCNC: 32.5 PG — SIGNIFICANT CHANGE UP (ref 27–34)
MCHC RBC-ENTMCNC: 33.5 GM/DL — SIGNIFICANT CHANGE UP (ref 32–36)
MCV RBC AUTO: 97 FL — SIGNIFICANT CHANGE UP (ref 80–100)
MONOCYTES # BLD AUTO: 0.65 K/UL — SIGNIFICANT CHANGE UP (ref 0–0.9)
MONOCYTES NFR BLD AUTO: 14.9 % — HIGH (ref 2–14)
NEUTROPHILS # BLD AUTO: 1.92 K/UL — SIGNIFICANT CHANGE UP (ref 1.8–7.4)
NEUTROPHILS NFR BLD AUTO: 44.1 % — SIGNIFICANT CHANGE UP (ref 43–77)
NITRITE UR-MCNC: NEGATIVE — SIGNIFICANT CHANGE UP
PCO2 BLDV: 52 MMHG — SIGNIFICANT CHANGE UP (ref 42–55)
PCO2 BLDV: 60 MMHG — HIGH (ref 42–55)
PH BLDV: 7.22 — LOW (ref 7.32–7.43)
PH BLDV: 7.28 — LOW (ref 7.32–7.43)
PH UR: 6 — SIGNIFICANT CHANGE UP (ref 5–8)
PLATELET # BLD AUTO: 164 K/UL — SIGNIFICANT CHANGE UP (ref 150–400)
PO2 BLDV: 49 MMHG — HIGH (ref 25–45)
PO2 BLDV: 49 MMHG — HIGH (ref 25–45)
POTASSIUM BLDV-SCNC: 4.1 MMOL/L — SIGNIFICANT CHANGE UP (ref 3.5–5.1)
POTASSIUM BLDV-SCNC: 4.2 MMOL/L — SIGNIFICANT CHANGE UP (ref 3.5–5.1)
POTASSIUM SERPL-MCNC: 4.4 MMOL/L — SIGNIFICANT CHANGE UP (ref 3.5–5.3)
POTASSIUM SERPL-SCNC: 4.4 MMOL/L — SIGNIFICANT CHANGE UP (ref 3.5–5.3)
PROT SERPL-MCNC: 7 G/DL — SIGNIFICANT CHANGE UP (ref 6.6–8.7)
PROT UR-MCNC: 15 MG/DL
RBC # BLD: 4.92 M/UL — SIGNIFICANT CHANGE UP (ref 4.2–5.8)
RBC # FLD: 13.2 % — SIGNIFICANT CHANGE UP (ref 10.3–14.5)
RBC CASTS # UR COMP ASSIST: NEGATIVE /HPF — SIGNIFICANT CHANGE UP (ref 0–4)
SAO2 % BLDV: 77.2 % — SIGNIFICANT CHANGE UP
SAO2 % BLDV: 77.5 % — SIGNIFICANT CHANGE UP
SODIUM SERPL-SCNC: 145 MMOL/L — SIGNIFICANT CHANGE UP (ref 135–145)
SP GR SPEC: 1.01 — SIGNIFICANT CHANGE UP (ref 1.01–1.02)
UROBILINOGEN FLD QL: NEGATIVE MG/DL — SIGNIFICANT CHANGE UP
WBC # BLD: 4.36 K/UL — SIGNIFICANT CHANGE UP (ref 3.8–10.5)
WBC # FLD AUTO: 4.36 K/UL — SIGNIFICANT CHANGE UP (ref 3.8–10.5)
WBC UR QL: NEGATIVE /HPF — SIGNIFICANT CHANGE UP (ref 0–5)

## 2023-06-12 PROCEDURE — 99284 EMERGENCY DEPT VISIT MOD MDM: CPT

## 2023-06-12 RX ORDER — SODIUM CHLORIDE 9 MG/ML
1000 INJECTION INTRAMUSCULAR; INTRAVENOUS; SUBCUTANEOUS ONCE
Refills: 0 | Status: COMPLETED | OUTPATIENT
Start: 2023-06-12 | End: 2023-06-12

## 2023-06-12 RX ADMIN — SODIUM CHLORIDE 1000 MILLILITER(S): 9 INJECTION INTRAMUSCULAR; INTRAVENOUS; SUBCUTANEOUS at 20:58

## 2023-06-12 RX ADMIN — SODIUM CHLORIDE 1000 MILLILITER(S): 9 INJECTION INTRAMUSCULAR; INTRAVENOUS; SUBCUTANEOUS at 21:57

## 2023-06-12 NOTE — ED ADULT NURSE REASSESSMENT NOTE - NS ED NURSE REASSESS COMMENT FT1
Pt. A&Ox4 and amb. Resp. equal and unlabored b/l. VSS. NAD. Comfort measures provided, safety measures implemented. Safe for DC.

## 2023-06-12 NOTE — ED ADULT TRIAGE NOTE - CHIEF COMPLAINT QUOTE
Pt report blood sugar have been critical reading at home and insulin was given multiple times last dose of insulin was 4 units of regular insulin at 6 pm. c/o of headache, excessive water intake, frequent urination. Hx type DM2 and brain surgery, seizures, On xarelto for blood clots

## 2023-06-12 NOTE — ED PROVIDER NOTE - CLINICAL SUMMARY MEDICAL DECISION MAKING FREE TEXT BOX
Patient with hyperglycemia. Plan to r/o DKA, control glucose and reassess. Patient with hyperglycemia. Plan to r/o DKA, control glucose and reassess.    Anthony PINZON: Acetone negative, lactate cleared, feeling good. Ready for dc. Return precautions given.

## 2023-06-12 NOTE — ED ADULT NURSE NOTE - NSFALLUNIVINTERV_ED_ALL_ED
Bed/Stretcher in lowest position, wheels locked, appropriate side rails in place/Call bell, personal items and telephone in reach/Instruct patient to call for assistance before getting out of bed/chair/stretcher/Non-slip footwear applied when patient is off stretcher/[place] to call system/Physically safe environment - no spills, clutter or unnecessary equipment/Purposeful proactive rounding/Room/bathroom lighting operational, light cord in reach

## 2023-06-12 NOTE — ED PROVIDER NOTE - OBJECTIVE STATEMENT
73M h/o TBI desmopressin/hydrocortisone, DVT, DI, DM p/w hyperglycemia x 1 day. Endorses polyuria/polydipsia for last couple of days. FSG in 300s or reading as high all day today. Subjective fever today.  Denies chills, nausea, vomiting, hematuria, dysuria, cough, nasal congestion.

## 2023-06-12 NOTE — ED ADULT TRIAGE NOTE - TEMPERATURE IN CELSIUS (DEGREES C)
Physician Discharge Summary     Patient ID:  Lorraine Acevedo  [id number]  36 y.o.  1972    Admit date: 6/21/2017    Discharge date: 6/28/2017      Admitting Physician: Uriel Francosi MD     Discharge Physician: Uriel Francois MD     Admission Diagnoses: ESRD on dialysis [facility]) [N18.6, Z99.2]    Discharge Diagnoses: There are no discharge diagnoses documented for the most recent discharge. Procedures for this admission:     Discharged Condition: stable    Hospital Course: Ms. Jaylene Johnson is a 41year old female who presented to THE [facility] for an elective left upper extremity shuntogram for venous outflow stenosis. During her elective procedure, the tip of the balloon catheter broke off and became lodged in a branch vein. Despite several attempts, we were unable to remove this foreign object and the venous branch was occluded. This removed any embolization risk, however Ms. Williamson's arm subsequently began to develop increasing edema. We we attempted to recannulize this vein branch or to angioplasty her venous outflow. We were unable to achieve either. Her venous outflow, however, did appear adequate during her fistulogram.  We have placed her in tubigrip compression and hope that her edema resolves over the next few days to weeks.     Consults: Nephrology    Significant Diagnostic Studies: angiography: retained foreign body, now venous flow around foreign body    Treatments: analgesia: acetaminophen w/ codeine    Discharge Exam: GEN: alert, cooperative, no distress, appears stated age  LYMPH: Cervical, supraclavicular, and axillary nodes normal.   THROAT & NECK: normal  LUNG: clear to auscultation bilaterally  HEART: regular rate and rhythm  ABDOMEN:  Soft, non tender  EXTREMITIES:   + thrill in left upper extremity graft.  + arm edema    Disposition: home    Patient Instructions:   Current Discharge Medication List      START taking these medications    Details   oxyCODONE-acetaminophen (PERCOCET 10)  mg per tablet Take 1 Tab by mouth every six (6) hours as needed. Max Daily Amount: 4 Tabs. Qty: 40 Tab, Refills: 0         CONTINUE these medications which have NOT CHANGED    Details   diphenhydrAMINE (BENADRYL) 50 mg tablet Take 50 mg by mouth Q TU, TH & SAT. Indications: predialysis med      albuterol (PROVENTIL HFA, VENTOLIN HFA, PROAIR HFA) 90 mcg/actuation inhaler Take 2 Puffs by inhalation every six (6) hours as needed for Wheezing. sevelamer (RENAGEL) 800 mg tablet Take 2,400 mg by mouth two (2) times a day. pravastatin (PRAVACHOL) 20 mg tablet Take 20 mg by mouth nightly. Indications: hypercholesterolemia      zolpidem (AMBIEN) 5 mg tablet Take 5 mg by mouth nightly as needed for Sleep. Indications: dialysis days      promethazine (PHENERGAN) 25 mg tablet Take 25 mg by mouth every six (6) hours as needed. Dialysis premed          STOP taking these medications       oxyCODONE-acetaminophen (PERCOCET 7.5) 7.5-325 mg per tablet Comments:   Reason for Stopping:         aspirin delayed-release 81 mg tablet Comments:   Reason for Stopping:         carvedilol (COREG) 3.125 mg tablet Comments:   Reason for Stopping:         docusate sodium (COLACE) 100 mg capsule Comments:   Reason for Stopping:         lisinopril (PRINIVIL, ZESTRIL) 5 mg tablet Comments:   Reason for Stopping:         clopidogrel (PLAVIX) 75 mg tab Comments:   Reason for Stopping:               Reference discharge instructions as provided by nursing for diet, labs, medications, activity, wound care and any outpatient referrals. Follow-up with Dr. Carlos Bain in 2 weeks.      Signed:  Donavon Morgan MD  6/28/2017  6:43 AM 36.8

## 2023-06-12 NOTE — ED PROVIDER NOTE - PATIENT PORTAL LINK FT
You can access the FollowMyHealth Patient Portal offered by [facility] by registering at the following website: [email or web address]. By joining [employer]’s FollowMyHealth portal, you will also be able to view your health information using other applications (apps) compatible with our system.

## 2023-06-12 NOTE — ED ADULT NURSE NOTE - OBJECTIVE STATEMENT
Patient presents to ED with hyperglycemia - as per patient, fingerstick checked at 1800 - reading 420.  Patient states the only symptom he has is a headache, which he took aspirin for at home prior to coming to the hospital.  Currently c/o frequent urination, thirst.  Denies c/p, sob, denies n/v/d.  No further complaints at this time.

## 2023-06-13 PROCEDURE — 87086 URINE CULTURE/COLONY COUNT: CPT

## 2023-06-13 PROCEDURE — 85018 HEMOGLOBIN: CPT

## 2023-06-13 PROCEDURE — 80053 COMPREHEN METABOLIC PANEL: CPT

## 2023-06-13 PROCEDURE — 81001 URINALYSIS AUTO W/SCOPE: CPT

## 2023-06-13 PROCEDURE — 84295 ASSAY OF SERUM SODIUM: CPT

## 2023-06-13 PROCEDURE — 83605 ASSAY OF LACTIC ACID: CPT

## 2023-06-13 PROCEDURE — 36415 COLL VENOUS BLD VENIPUNCTURE: CPT

## 2023-06-13 PROCEDURE — 85025 COMPLETE CBC W/AUTO DIFF WBC: CPT

## 2023-06-13 PROCEDURE — 82330 ASSAY OF CALCIUM: CPT

## 2023-06-13 PROCEDURE — 82435 ASSAY OF BLOOD CHLORIDE: CPT

## 2023-06-13 PROCEDURE — 84132 ASSAY OF SERUM POTASSIUM: CPT

## 2023-06-13 PROCEDURE — 82009 KETONE BODYS QUAL: CPT

## 2023-06-13 PROCEDURE — 82962 GLUCOSE BLOOD TEST: CPT

## 2023-06-13 PROCEDURE — 82947 ASSAY GLUCOSE BLOOD QUANT: CPT

## 2023-06-13 PROCEDURE — 85014 HEMATOCRIT: CPT

## 2023-06-13 PROCEDURE — 82803 BLOOD GASES ANY COMBINATION: CPT

## 2023-06-13 PROCEDURE — 99283 EMERGENCY DEPT VISIT LOW MDM: CPT

## 2023-06-14 LAB
CULTURE RESULTS: SIGNIFICANT CHANGE UP
SPECIMEN SOURCE: SIGNIFICANT CHANGE UP

## 2023-07-11 ENCOUNTER — APPOINTMENT (OUTPATIENT)
Dept: NEUROLOGY | Facility: CLINIC | Age: 73
End: 2023-07-11
Payer: MEDICARE

## 2023-07-11 VITALS
SYSTOLIC BLOOD PRESSURE: 96 MMHG | WEIGHT: 263 LBS | HEIGHT: 71 IN | BODY MASS INDEX: 36.82 KG/M2 | HEART RATE: 77 BPM | DIASTOLIC BLOOD PRESSURE: 62 MMHG | TEMPERATURE: 97.6 F

## 2023-07-11 PROCEDURE — 99214 OFFICE O/P EST MOD 30 MIN: CPT

## 2023-07-11 NOTE — PHYSICAL EXAM
[General Appearance - Alert] : alert [General Appearance - In No Acute Distress] : in no acute distress [Oriented To Time, Place, And Person] : oriented to person, place, and time [Impaired Insight] : insight and judgment were intact [Affect] : the affect was normal [Person] : oriented to person [Place] : oriented to place [Time] : oriented to time [Registration Intact] : recent registration memory intact [Concentration Intact] : normal concentrating ability [Naming Objects] : no difficulty naming common objects [Repeating Phrases] : no difficulty repeating a phrase [Fluency] : fluency intact [Comprehension] : comprehension intact [Past History] : adequate knowledge of personal past history [Cranial Nerves Optic (II)] : visual acuity intact bilaterally,  visual fields full to confrontation, pupils equal round and reactive to light [Cranial Nerves Oculomotor (III)] : extraocular motion intact [Cranial Nerves Trigeminal (V)] : facial sensation intact symmetrically [Cranial Nerves Facial (VII)] : face symmetrical [Cranial Nerves Vestibulocochlear (VIII)] : hearing was intact bilaterally [Cranial Nerves Glossopharyngeal (IX)] : tongue and palate midline [Cranial Nerves Accessory (XI - Cranial And Spinal)] : head turning and shoulder shrug symmetric [Cranial Nerves Hypoglossal (XII)] : there was no tongue deviation with protrusion [Motor Tone] : muscle tone was normal in all four extremities [Motor Strength] : muscle strength was normal in all four extremities [No Muscle Atrophy] : normal bulk in all four extremities [Sensation Tactile Decrease] : light touch was intact [Limited Balance] : the patient's balance was impaired [Tremor] : a tremor present [2+] : Patella left 2+ [1+] : Ankle jerk right 1+ [PERRL With Normal Accommodation] : pupils were equal in size, round, reactive to light, with normal accommodation [Extraocular Movements] : extraocular movements were intact [Full Visual Field] : full visual field [] : the neck was supple [Neck Cervical Mass (___cm)] : no neck mass was observed [Auscultation Breath Sounds / Voice Sounds] : lungs were clear to auscultation bilaterally [Heart Rate And Rhythm] : heart rate was normal and rhythm regular [Heart Sounds] : normal S1 and S2 [Arterial Pulses Carotid] : carotid pulses were normal with no bruits [Edema] : there was no peripheral edema [Short Term Intact] : short term memory impaired [Past-pointing] : there was no past-pointing [Dysdiadochokinesia Bilaterally] : not present [Coordination - Dysmetria Impaired Finger-to-Nose Bilateral] : not present [Plantar Reflex Right Only] : normal on the right [Plantar Reflex Left Only] : normal on the left [FreeTextEntry6] : Left foot in splint [Papilledema Of Both Eyes] : no papilledema

## 2023-07-11 NOTE — HISTORY OF PRESENT ILLNESS
[FreeTextEntry1] : Pt is here for a follow-up visit today, last seen on 12/20//22, patient is accompanied by his daughter. Pt has no new complaints, he reports he has occasional headaches, sometimes at night, occasionally during daytime, he calls the mild, he reports these are 2-3/month, however his wife who joined us on the phone call reports he has been getting more frequent headaches at least 2/week, reportedly he missed few injections of Ajovy. Pt is  taking Migrelief one capsule daily.\par \par Pt has not had any seizure like episodes,  he is taking Topiramate 200 mg b.i.d., in addition to Depakote 500 mg daily.\par \par Pt has history of mood disorder, uses Effexor, he admits he is sedentary, has not been walking much as he reports he has knee pains.

## 2023-07-11 NOTE — DATA REVIEWED
[de-identified] : 2/24/21: EEG shows focal slowing in the right temporal and parietal region superimposed on generalized slowing, Potentially structural in nature, no epileptiform discharges or seizures.. [de-identified] : 2/10/20: CT head: Status post right craniotomy with underlying due to of thickening without significant change from prior study, no evidence of acute intracranial hemorrhage
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
## 2023-09-01 ENCOUNTER — OFFICE (OUTPATIENT)
Dept: URBAN - METROPOLITAN AREA CLINIC 104 | Facility: CLINIC | Age: 73
Setting detail: OPHTHALMOLOGY
End: 2023-09-01
Payer: MEDICARE

## 2023-09-01 DIAGNOSIS — H16.223: ICD-10-CM

## 2023-09-01 DIAGNOSIS — E11.9: ICD-10-CM

## 2023-09-01 DIAGNOSIS — H01.001: ICD-10-CM

## 2023-09-01 DIAGNOSIS — H01.005: ICD-10-CM

## 2023-09-01 DIAGNOSIS — Z96.1: ICD-10-CM

## 2023-09-01 DIAGNOSIS — H01.002: ICD-10-CM

## 2023-09-01 DIAGNOSIS — H01.004: ICD-10-CM

## 2023-09-01 DIAGNOSIS — H18.413: ICD-10-CM

## 2023-09-01 DIAGNOSIS — Z79.84: ICD-10-CM

## 2023-09-01 PROCEDURE — 92014 COMPRE OPH EXAM EST PT 1/>: CPT | Performed by: OPTOMETRIST

## 2023-09-01 ASSESSMENT — CONFRONTATIONAL VISUAL FIELD TEST (CVF)
OS_FINDINGS: FULL
OD_FINDINGS: FULL

## 2023-09-01 ASSESSMENT — KERATOMETRY
OS_K2POWER_DIOPTERS: 44.53
OD_K2POWER_DIOPTERS: 44.12
OS_K1POWER_DIOPTERS: 43.66
OS_AXISANGLE_DEGREES: 78
OD_AXISANGLE_DEGREES: 92
OD_K1POWER_DIOPTERS: 43.16

## 2023-09-01 ASSESSMENT — REFRACTION_AUTOREFRACTION
OD_AXIS: 90
OS_AXIS: 88
OS_CYLINDER: -1.00
OS_SPHERE: +0.75
OD_CYLINDER: -0.50
OD_SPHERE: +0.75

## 2023-09-01 ASSESSMENT — AXIALLENGTH_DERIVED
OD_AL: 23.3482
OS_AL: 23.2801

## 2023-09-01 ASSESSMENT — SPHEQUIV_DERIVED
OD_SPHEQUIV: 0.5
OS_SPHEQUIV: 0.25

## 2023-09-01 ASSESSMENT — SUPERFICIAL PUNCTATE KERATITIS (SPK)
OS_SPK: 1+ 2+
OD_SPK: 1+

## 2023-09-01 ASSESSMENT — TONOMETRY
OS_IOP_MMHG: 10
OD_IOP_MMHG: 10

## 2023-09-01 ASSESSMENT — VISUAL ACUITY
OD_BCVA: 20/25
OS_BCVA: 20/25-2

## 2023-09-01 ASSESSMENT — LID EXAM ASSESSMENTS
OD_BLEPHARITIS: RLL RUL 3+
OS_BLEPHARITIS: LLL LUL 3+

## 2023-09-13 ENCOUNTER — EMERGENCY (EMERGENCY)
Facility: HOSPITAL | Age: 73
LOS: 1 days | Discharge: HOME | End: 2023-09-13
Attending: EMERGENCY MEDICINE
Payer: MEDICARE

## 2023-09-13 VITALS
DIASTOLIC BLOOD PRESSURE: 61 MMHG | WEIGHT: 259.93 LBS | HEART RATE: 95 BPM | TEMPERATURE: 99 F | RESPIRATION RATE: 20 BRPM | SYSTOLIC BLOOD PRESSURE: 102 MMHG | OXYGEN SATURATION: 99 %

## 2023-09-13 DIAGNOSIS — Z98.890 OTHER SPECIFIED POSTPROCEDURAL STATES: Chronic | ICD-10-CM

## 2023-09-13 DIAGNOSIS — Z95.828 PRESENCE OF OTHER VASCULAR IMPLANTS AND GRAFTS: Chronic | ICD-10-CM

## 2023-09-13 LAB
ANION GAP SERPL CALC-SCNC: 13 MMOL/L — SIGNIFICANT CHANGE UP (ref 5–17)
BASOPHILS # BLD AUTO: 0 K/UL — SIGNIFICANT CHANGE UP (ref 0–0.2)
BASOPHILS NFR BLD AUTO: 0 % — SIGNIFICANT CHANGE UP (ref 0–2)
BUN SERPL-MCNC: 17.3 MG/DL — SIGNIFICANT CHANGE UP (ref 8–20)
CALCIUM SERPL-MCNC: 9.6 MG/DL — SIGNIFICANT CHANGE UP (ref 8.4–10.5)
CHLORIDE SERPL-SCNC: 105 MMOL/L — SIGNIFICANT CHANGE UP (ref 96–108)
CO2 SERPL-SCNC: 23 MMOL/L — SIGNIFICANT CHANGE UP (ref 22–29)
CREAT SERPL-MCNC: 1.21 MG/DL — SIGNIFICANT CHANGE UP (ref 0.5–1.3)
EGFR: 64 ML/MIN/1.73M2 — SIGNIFICANT CHANGE UP
EOSINOPHIL # BLD AUTO: 0.07 K/UL — SIGNIFICANT CHANGE UP (ref 0–0.5)
EOSINOPHIL NFR BLD AUTO: 0.9 % — SIGNIFICANT CHANGE UP (ref 0–6)
GIANT PLATELETS BLD QL SMEAR: PRESENT — SIGNIFICANT CHANGE UP
GLUCOSE SERPL-MCNC: 111 MG/DL — HIGH (ref 70–99)
HCT VFR BLD CALC: 43.5 % — SIGNIFICANT CHANGE UP (ref 39–50)
HGB BLD-MCNC: 14.9 G/DL — SIGNIFICANT CHANGE UP (ref 13–17)
LYMPHOCYTES # BLD AUTO: 1.77 K/UL — SIGNIFICANT CHANGE UP (ref 1–3.3)
LYMPHOCYTES # BLD AUTO: 24.3 % — SIGNIFICANT CHANGE UP (ref 13–44)
MANUAL SMEAR VERIFICATION: SIGNIFICANT CHANGE UP
MCHC RBC-ENTMCNC: 33 PG — SIGNIFICANT CHANGE UP (ref 27–34)
MCHC RBC-ENTMCNC: 34.3 GM/DL — SIGNIFICANT CHANGE UP (ref 32–36)
MCV RBC AUTO: 96.5 FL — SIGNIFICANT CHANGE UP (ref 80–100)
MONOCYTES # BLD AUTO: 1.01 K/UL — HIGH (ref 0–0.9)
MONOCYTES NFR BLD AUTO: 13.9 % — SIGNIFICANT CHANGE UP (ref 2–14)
NEUTROPHILS # BLD AUTO: 4.36 K/UL — SIGNIFICANT CHANGE UP (ref 1.8–7.4)
NEUTROPHILS NFR BLD AUTO: 60 % — SIGNIFICANT CHANGE UP (ref 43–77)
PLAT MORPH BLD: NORMAL — SIGNIFICANT CHANGE UP
PLATELET # BLD AUTO: 141 K/UL — LOW (ref 150–400)
POIKILOCYTOSIS BLD QL AUTO: SIGNIFICANT CHANGE UP
POTASSIUM SERPL-MCNC: 4.7 MMOL/L — SIGNIFICANT CHANGE UP (ref 3.5–5.3)
POTASSIUM SERPL-SCNC: 4.7 MMOL/L — SIGNIFICANT CHANGE UP (ref 3.5–5.3)
RBC # BLD: 4.51 M/UL — SIGNIFICANT CHANGE UP (ref 4.2–5.8)
RBC # FLD: 13.3 % — SIGNIFICANT CHANGE UP (ref 10.3–14.5)
RBC BLD AUTO: ABNORMAL
SMUDGE CELLS # BLD: PRESENT — SIGNIFICANT CHANGE UP
SODIUM SERPL-SCNC: 140 MMOL/L — SIGNIFICANT CHANGE UP (ref 135–145)
VARIANT LYMPHS # BLD: 0.9 % — SIGNIFICANT CHANGE UP (ref 0–6)
WBC # BLD: 7.27 K/UL — SIGNIFICANT CHANGE UP (ref 3.8–10.5)
WBC # FLD AUTO: 7.27 K/UL — SIGNIFICANT CHANGE UP (ref 3.8–10.5)

## 2023-09-13 PROCEDURE — 99284 EMERGENCY DEPT VISIT MOD MDM: CPT

## 2023-09-13 RX ORDER — TETANUS TOXOID, REDUCED DIPHTHERIA TOXOID AND ACELLULAR PERTUSSIS VACCINE, ADSORBED 5; 2.5; 8; 8; 2.5 [IU]/.5ML; [IU]/.5ML; UG/.5ML; UG/.5ML; UG/.5ML
0.5 SUSPENSION INTRAMUSCULAR ONCE
Refills: 0 | Status: COMPLETED | OUTPATIENT
Start: 2023-09-13 | End: 2023-09-13

## 2023-09-13 RX ORDER — AMPICILLIN SODIUM AND SULBACTAM SODIUM 250; 125 MG/ML; MG/ML
3 INJECTION, POWDER, FOR SUSPENSION INTRAMUSCULAR; INTRAVENOUS ONCE
Refills: 0 | Status: COMPLETED | OUTPATIENT
Start: 2023-09-13 | End: 2023-09-13

## 2023-09-13 NOTE — ED PROVIDER NOTE - NS ED ATTENDING STATEMENT MOD
This was a shared visit with the SHARMILA. I reviewed and verified the documentation and independently performed the documented:

## 2023-09-13 NOTE — ED PROVIDER NOTE - ATTENDING APP SHARED VISIT CONTRIBUTION OF CARE
I, Marion Barber, performed the initial face to face bedside interview with this patient regarding history of present illness, review of symptoms and relevant past medical, social and family history.  I completed an independent physical examination.  I was the initial provider who evaluated this patient. I have signed out the follow up of any pending tests (i.e. labs, radiological studies) to the ACP.  I have communicated the patient’s plan of care and disposition with the ACP.  The history, relevant review of systems, past medical and surgical history, medical decision making, and physical examination was documented by the scribe in my presence and I attest to the accuracy of the documentation.

## 2023-09-13 NOTE — ED PROVIDER NOTE - NSFOLLOWUPINSTRUCTIONS_ED_ALL_ED_FT
- Please bring all documentation from your ED visit to any related future follow up appointment.  - Please call to schedule follow up appointment with your primary care physician within 24-48 hours.  - Please seek immediate medical attention or return to the ED for any new/worsening, signs/symptoms, or concerns  - return in 2-3d for packing removal  - change dressing multiple times of day  - take Antiobiotic as directed

## 2023-09-13 NOTE — ED ADULT TRIAGE NOTE - GLASGOW COMA SCALE: EYE OPENING, MLM
Initial Anesthesia Post-op Note    Patient: Georgette Hernandez  Procedure(s) Performed: PPM LEADLESS SINGLE IMPLANT - CV; ABLATION AV NODE - CV  Anesthesia type: MAC    Vitals Value Taken Time   Temp 35.7 °C (96.3 °F) 07/01/21 1004   Pulse 90 07/01/21 1004   Resp 16 07/01/21 1004   SpO2 94 % 07/01/21 1004   /70 07/01/21 1004         Patient Location: PACU Phase 1  Post-op Vital Signs:stable  Level of Consciousness: awake and alert  Respiratory Status: spontaneous ventilation  Cardiovascular stable  Hydration: euvolemic  Pain Management: adequately controlled  Handoff: Handoff to receiving nurse was performed and questions were answered  Vomiting: none  Nausea: None  Airway Patency:patent  Post-op Assessment: no complications and patient tolerated procedure well with no complications      There were no known complications for this encounter.  
(E4) spontaneous
Zygomaticofacial Flap Text: Given the location of the defect, shape of the defect and the proximity to free margins a zygomaticofacial flap was deemed most appropriate for repair.  Using a sterile surgical marker, the appropriate flap was drawn incorporating the defect and placing the expected incisions within the relaxed skin tension lines where possible. The area thus outlined was incised deep to adipose tissue with a #15 scalpel blade with preservation of a vascular pedicle.  The skin margins were undermined to an appropriate distance in all directions utilizing iris scissors.  The flap was then placed into the defect and anchored with interrupted buried subcutaneous sutures.

## 2023-09-13 NOTE — ED ADULT NURSE NOTE - OBJECTIVE STATEMENT
Pt. c/o abscess to left side of abd. ongoing for 1 month, w/pain, redness, drainage.  Pt. attempted to drain it himself.  c/o pain.

## 2023-09-13 NOTE — ED PROVIDER NOTE - OBJECTIVE STATEMENT
72 y/o male with PMHx of Brain tumor, Diabetes insipidus, DM, DVT, HLD, Hypothyroid, Obstructive Sleep Apnea, PE presents to the ED c/o abscess to left lower abdomen for one month with drainage, redness, swelling, and warmth. Pt endorses subjective fevers and has tried to drain it himself. No known allergies to antibiotics.

## 2023-09-13 NOTE — ED PROVIDER NOTE - CLINICAL SUMMARY MEDICAL DECISION MAKING FREE TEXT BOX
74 y/o male with abscess to left lateral abdomen with central fluctuance, TTP, warm to touch, and erythematous. Will do I&D, labs, IV antibiotics, and Tetanus. 72 y/o male with abscess to left lateral abdomen with central fluctuance, TTP, warm to touch, and erythematous. Will do I&D, labs, IV antibiotics, and Tetanus.    MICHAEL Velázquez: sent abx to pharmacy. I+D performed, packing placed. advised to return in 2-3d for packing removal. labs WNL. reviewed results with pt. discussed supportive care measures and return precautions. advised to f/u with PCP. pt verbalized understanding and agreement

## 2023-09-13 NOTE — ED PROVIDER NOTE - PATIENT PORTAL LINK FT
You can access the FollowMyHealth Patient Portal offered by [facility] by registering at the following website: [email or web address]. By joining [employer]’s FollowMyHealth portal, you will also be able to view your health information using other applications (apps) compatible with our system.

## 2023-09-13 NOTE — ED ADULT TRIAGE NOTE - CHIEF COMPLAINT QUOTE
Pt reporting pain to left abdomen, Pt stating 'Megan had this lump for a month but its gotten worse. today the pain is the worst.' Pt observed to have reddened abscess to L abdomen. Pt reporting purulent drainage from abscess. Pt with bandaid in place, dressing CDI. Pt reporting headache and Diarrhea today. Pt denies fevers, lightheadedness, dizziness, N/V. Pt with extensive PMH. Pt with no apparent acute distress observed at this time. Ambulatory in triage.

## 2023-09-13 NOTE — ED ADULT NURSE NOTE - NSFALLUNIVINTERV_ED_ALL_ED
Bed/Stretcher in lowest position, wheels locked, appropriate side rails in place/Call bell, personal items and telephone in reach/Instruct patient to call for assistance before getting out of bed/chair/stretcher/Non-slip footwear applied when patient is off stretcher/[place] to call system/Physically safe environment - no spills, clutter or unnecessary equipment/Purposeful proactive rounding/Room/bathroom lighting operational, light cord in reach

## 2023-09-14 PROCEDURE — 80048 BASIC METABOLIC PNL TOTAL CA: CPT

## 2023-09-14 PROCEDURE — 10061 I&D ABSCESS COMP/MULTIPLE: CPT

## 2023-09-14 PROCEDURE — 99284 EMERGENCY DEPT VISIT MOD MDM: CPT | Mod: 25

## 2023-09-14 PROCEDURE — 90471 IMMUNIZATION ADMIN: CPT

## 2023-09-14 PROCEDURE — 36415 COLL VENOUS BLD VENIPUNCTURE: CPT

## 2023-09-14 PROCEDURE — 85025 COMPLETE CBC W/AUTO DIFF WBC: CPT

## 2023-09-14 PROCEDURE — 96374 THER/PROPH/DIAG INJ IV PUSH: CPT | Mod: XU

## 2023-09-14 PROCEDURE — 90715 TDAP VACCINE 7 YRS/> IM: CPT

## 2023-09-14 RX ORDER — KETOROLAC TROMETHAMINE 30 MG/ML
30 SYRINGE (ML) INJECTION ONCE
Refills: 0 | Status: COMPLETED | OUTPATIENT
Start: 2023-09-14 | End: 2023-09-14

## 2023-09-14 RX ORDER — TRANEXAMIC ACID 100 MG/ML
5 INJECTION, SOLUTION INTRAVENOUS ONCE
Refills: 0 | Status: COMPLETED | OUTPATIENT
Start: 2023-09-14 | End: 2023-09-14

## 2023-09-14 RX ADMIN — AMPICILLIN SODIUM AND SULBACTAM SODIUM 200 GRAM(S): 250; 125 INJECTION, POWDER, FOR SUSPENSION INTRAMUSCULAR; INTRAVENOUS at 00:15

## 2023-09-14 RX ADMIN — TETANUS TOXOID, REDUCED DIPHTHERIA TOXOID AND ACELLULAR PERTUSSIS VACCINE, ADSORBED 0.5 MILLILITER(S): 5; 2.5; 8; 8; 2.5 SUSPENSION INTRAMUSCULAR at 00:15

## 2023-09-14 RX ADMIN — TRANEXAMIC ACID 5 MILLILITER(S): 100 INJECTION, SOLUTION INTRAVENOUS at 00:15

## 2023-09-16 ENCOUNTER — EMERGENCY (EMERGENCY)
Facility: HOSPITAL | Age: 73
LOS: 1 days | Discharge: HOME | End: 2023-09-16
Attending: EMERGENCY MEDICINE
Payer: MEDICARE

## 2023-09-16 VITALS
RESPIRATION RATE: 18 BRPM | HEART RATE: 78 BPM | TEMPERATURE: 98 F | HEIGHT: 71 IN | OXYGEN SATURATION: 98 % | DIASTOLIC BLOOD PRESSURE: 66 MMHG | SYSTOLIC BLOOD PRESSURE: 105 MMHG | WEIGHT: 259.93 LBS

## 2023-09-16 DIAGNOSIS — Z98.890 OTHER SPECIFIED POSTPROCEDURAL STATES: Chronic | ICD-10-CM

## 2023-09-16 DIAGNOSIS — Z95.828 PRESENCE OF OTHER VASCULAR IMPLANTS AND GRAFTS: Chronic | ICD-10-CM

## 2023-09-16 PROCEDURE — 99283 EMERGENCY DEPT VISIT LOW MDM: CPT | Mod: 25

## 2023-09-16 PROCEDURE — 10060 I&D ABSCESS SIMPLE/SINGLE: CPT

## 2023-09-16 PROCEDURE — 99283 EMERGENCY DEPT VISIT LOW MDM: CPT

## 2023-09-16 NOTE — ED PROVIDER NOTE - OBJECTIVE STATEMENT
A 74 y/o M with PMHx of Brain tumor, Diabetes insipidus, DM, DVT, HLD, Hypothyroid, Obstructive Sleep Apnea, PE, who had I&D for abdominal skin abscess 3 days ago, presents to the ED c/o requesting wound packing change. Reports pt developed no new symptom, including fever, chill, or pain. Pt is now on 7 days course of Augmentin, starting on 9/14.

## 2023-09-16 NOTE — ED PROVIDER NOTE - PHYSICAL EXAMINATION
VITALS: reviewed  GEN: No apparent distress, A & O x 4  HEAD/EYES: NC/AT, PERRL, EOMI, anicteric sclerae, no conjunctival pallor  ENT: mucus membranes moist, trachea midline, no JVD, neck is supple  RESP: lungs CTA with equal breath sounds bilaterally, chest wall nontender and atraumatic  CV: heart with reg rhythm S1, S2, no murmur; distal pulses intact and symmetric bilaterally  ABDOMEN: normoactive bowel sounds, soft, nondistended, nontender, no palpable masses  MSK: extremities atraumatic and nontender, no edema, no asymmetry.   SKIN: warm, dry, no rash, no bruising, no cyanosis.  4cm abscess to left lateral abdomen with 1 cm incision and packed with 1 gauze. Redness around wound, TTP, warm to touch, and erythematous.  ++yellow-colored discharges on gauze. Moderate amount necrotic tissues in the pocket.    NEURO: alert, mentating appropriately, no facial asymmetry. gross sensation, motor, coordination are intact  PSYCH: Affect appropriate

## 2023-09-16 NOTE — ED PROVIDER NOTE - CLINICAL SUMMARY MEDICAL DECISION MAKING FREE TEXT BOX
Copious irrigation applied with 200 ml of saline. All necrotic tissues and gauze removed. Applied 1 cm incision to expand the previous wound. Put 1 gauze to fill the pocket. Procedure was performed after local lidocaine injection and by sterile technique. Pt was told to come back in 3 days for wound check/removal.

## 2023-09-16 NOTE — ED PROVIDER NOTE - PATIENT PORTAL LINK FT
You can access the FollowMyHealth Patient Portal offered by [facility] by registering at the following website: [email or web address]. By joining [employer]’s FollowMyHealth portal, you will also be able to view your health information using other applications (apps) compatible with our system.

## 2023-09-16 NOTE — ED PROVIDER NOTE - NSFOLLOWUPINSTRUCTIONS_ED_ALL_ED_FT
- Please call to schedule follow up appointment with your primary care physician within 24-48 hours.  - Please seek immediate medical attention or return to the ED for any new/worsening, signs/symptoms, or concerns  - return in 2-3d for packing removal  - change dressing multiple times of day  - take Antiobiotic as directed

## 2023-09-19 ENCOUNTER — EMERGENCY (EMERGENCY)
Facility: HOSPITAL | Age: 73
LOS: 1 days | Discharge: HOME | End: 2023-09-19
Attending: EMERGENCY MEDICINE
Payer: MEDICARE

## 2023-09-19 VITALS
SYSTOLIC BLOOD PRESSURE: 100 MMHG | OXYGEN SATURATION: 95 % | HEART RATE: 73 BPM | TEMPERATURE: 98 F | DIASTOLIC BLOOD PRESSURE: 60 MMHG | WEIGHT: 259.93 LBS | RESPIRATION RATE: 20 BRPM | HEIGHT: 71 IN

## 2023-09-19 DIAGNOSIS — Z98.890 OTHER SPECIFIED POSTPROCEDURAL STATES: Chronic | ICD-10-CM

## 2023-09-19 DIAGNOSIS — Z95.828 PRESENCE OF OTHER VASCULAR IMPLANTS AND GRAFTS: Chronic | ICD-10-CM

## 2023-09-19 PROCEDURE — G0463: CPT

## 2023-09-19 PROCEDURE — L9995: CPT

## 2023-09-19 NOTE — ED PROVIDER NOTE - CLINICAL SUMMARY MEDICAL DECISION MAKING FREE TEXT BOX
74yo M presenting for wound check of abdominal wall abscess I&D. Wound appears to be healing well. packing removed and dressing applied. educated on proper wound care. sent few more days of augmentin. return precautions discussed

## 2023-09-19 NOTE — ED PROVIDER NOTE - OBJECTIVE STATEMENT
72 y/o M with PMHx of Brain tumor, Diabetes insipidus, DM, DVT, HLD, Hypothyroid, Obstructive Sleep Apnea, PE, who had I&D for abdominal skin abscess 9/14 , presents to the ED c/o requesting wound packing removal. Pt returned to ED on 9/16 and had additional I&D and packing changed. Has been on augmentin since initial visit. States area looks much better, redness improved. Denies fever, purulent drainage, linear streaking.

## 2023-09-19 NOTE — ED ADULT NURSE NOTE - NSFALLUNIVINTERV_ED_ALL_ED
Bed/Stretcher in lowest position, wheels locked, appropriate side rails in place/Call bell, personal items and telephone in reach/Instruct patient to call for assistance before getting out of bed/chair/stretcher/Non-slip footwear applied when patient is off stretcher/[place] to call system/Physically safe environment - no spills, clutter or unnecessary equipment/Purposeful proactive rounding/Room/bathroom lighting operational, light cord in reach

## 2023-09-19 NOTE — ED PROVIDER NOTE - NS ED ROS FT
Gen: denies fever, chills, fatigue, weight loss  Skin: +Packing removal/wound check. denies rashes, laceration, bruising  HEENT: denies visual changes, ear pain, nasal congestion, throat pain  Respiratory: denies PORTILLO, SOB, cough, wheezing  Cardiovascular: denies chest pain, palpitations, diaphoresis, LE edema  MSK: denies joint swelling/pain, back pain, neck pain  Neuro: denies headache, dizziness, weakness, numbness

## 2023-09-19 NOTE — ED PROCEDURE NOTE - CPROC ED TOLERANCE1
Urine does show UTI. Will put on cipro 500mg bid x 7 days. Culture will likely come back on Saturday and I'll send them message through portal with the culture results.   Patient tolerated procedure well.

## 2023-09-19 NOTE — ED PROVIDER NOTE - NSFOLLOWUPINSTRUCTIONS_ED_ALL_ED_FT
- Return to the ED for any new or worsening symptoms.   - Rinse area with soap and water 3-4x/day  - Change dressing daily as directed  - Please complete course of antibiotics    Abscess    An abscess is an infected area that contains a collection of pus and debris. It can occur in almost any part of the body and occurs when the tissue gets infection. Symptoms include a painful mass that is red, warm, tender that might break open and HAVE drainage. If your health care provider gave you antibiotics make sure to take the full course and do not stop even if feeling better.     SEEK IMMEDIATE MEDICAL CARE IF YOU HAVE ANY OF THE FOLLOWING SYMPTOMS: chills, fever, muscle aches, or red streaking from the area.

## 2023-09-19 NOTE — ED PROVIDER NOTE - PHYSICAL EXAMINATION
Gen: No acute distress, non toxic  Head: NCAT  Eyes: pink conjunctivae, EOMI, PERRL  Neuro: A&O x 3, CN II-XII intact, sensorimotor intact without deficits   MSK: No spine or joint tenderness to palpation, Full ROM ext x 4  Skin: Left abdominal wall incision site with granulation tissue, wound edges without erythema. no surrounding erythema or induration. no purulent drainage once packing removed.

## 2023-09-19 NOTE — ED PROVIDER NOTE - PATIENT PORTAL LINK FT
You can access the FollowMyHealth Patient Portal offered by [facility] by registering at the following website: [email or web address]. By joining [employer]’s FollowMyHealth portal, you will also be able to view your health information using other applications (apps) compatible with our system.

## 2023-09-19 NOTE — ED PROVIDER NOTE - ATTENDING APP SHARED VISIT CONTRIBUTION OF CARE
I&D for abdominal skin abscess 9/14 , presents to the ED c/o requesting wound packing removal.  plan wound care

## 2023-09-19 NOTE — ED ADULT TRIAGE NOTE - CHIEF COMPLAINT QUOTE
Pt arrives to ED requesting wound check - pt was instructed to come back to ED for wound check on Saturday

## 2023-12-03 ENCOUNTER — EMERGENCY (EMERGENCY)
Facility: HOSPITAL | Age: 73
LOS: 1 days | Discharge: HOME | End: 2023-12-03
Attending: EMERGENCY MEDICINE
Payer: MEDICARE

## 2023-12-03 VITALS
OXYGEN SATURATION: 95 % | SYSTOLIC BLOOD PRESSURE: 128 MMHG | WEIGHT: 265 LBS | HEIGHT: 71 IN | HEART RATE: 101 BPM | DIASTOLIC BLOOD PRESSURE: 83 MMHG | RESPIRATION RATE: 18 BRPM | TEMPERATURE: 98 F

## 2023-12-03 DIAGNOSIS — Z98.890 OTHER SPECIFIED POSTPROCEDURAL STATES: Chronic | ICD-10-CM

## 2023-12-03 DIAGNOSIS — Z95.828 PRESENCE OF OTHER VASCULAR IMPLANTS AND GRAFTS: Chronic | ICD-10-CM

## 2023-12-03 LAB
ALBUMIN SERPL ELPH-MCNC: 4 G/DL — SIGNIFICANT CHANGE UP (ref 3.3–5.2)
ALBUMIN SERPL ELPH-MCNC: 4 G/DL — SIGNIFICANT CHANGE UP (ref 3.3–5.2)
ALP SERPL-CCNC: 82 U/L — SIGNIFICANT CHANGE UP (ref 40–120)
ALP SERPL-CCNC: 82 U/L — SIGNIFICANT CHANGE UP (ref 40–120)
ALT FLD-CCNC: 77 U/L — HIGH
ALT FLD-CCNC: 77 U/L — HIGH
ANION GAP SERPL CALC-SCNC: 11 MMOL/L — SIGNIFICANT CHANGE UP (ref 5–17)
ANION GAP SERPL CALC-SCNC: 11 MMOL/L — SIGNIFICANT CHANGE UP (ref 5–17)
ANION GAP SERPL CALC-SCNC: 13 MMOL/L — SIGNIFICANT CHANGE UP (ref 5–17)
ANION GAP SERPL CALC-SCNC: 13 MMOL/L — SIGNIFICANT CHANGE UP (ref 5–17)
APPEARANCE UR: CLEAR — SIGNIFICANT CHANGE UP
APPEARANCE UR: CLEAR — SIGNIFICANT CHANGE UP
AST SERPL-CCNC: 46 U/L — HIGH
AST SERPL-CCNC: 46 U/L — HIGH
BACTERIA # UR AUTO: NEGATIVE /HPF — SIGNIFICANT CHANGE UP
BACTERIA # UR AUTO: NEGATIVE /HPF — SIGNIFICANT CHANGE UP
BILIRUB SERPL-MCNC: 0.4 MG/DL — SIGNIFICANT CHANGE UP (ref 0.4–2)
BILIRUB SERPL-MCNC: 0.4 MG/DL — SIGNIFICANT CHANGE UP (ref 0.4–2)
BILIRUB UR-MCNC: NEGATIVE — SIGNIFICANT CHANGE UP
BILIRUB UR-MCNC: NEGATIVE — SIGNIFICANT CHANGE UP
BUN SERPL-MCNC: 16.4 MG/DL — SIGNIFICANT CHANGE UP (ref 8–20)
BUN SERPL-MCNC: 16.4 MG/DL — SIGNIFICANT CHANGE UP (ref 8–20)
BUN SERPL-MCNC: 17.3 MG/DL — SIGNIFICANT CHANGE UP (ref 8–20)
BUN SERPL-MCNC: 17.3 MG/DL — SIGNIFICANT CHANGE UP (ref 8–20)
CALCIUM SERPL-MCNC: 8.8 MG/DL — SIGNIFICANT CHANGE UP (ref 8.4–10.5)
CALCIUM SERPL-MCNC: 8.8 MG/DL — SIGNIFICANT CHANGE UP (ref 8.4–10.5)
CALCIUM SERPL-MCNC: 9.7 MG/DL — SIGNIFICANT CHANGE UP (ref 8.4–10.5)
CALCIUM SERPL-MCNC: 9.7 MG/DL — SIGNIFICANT CHANGE UP (ref 8.4–10.5)
CAST: 0 /LPF — SIGNIFICANT CHANGE UP (ref 0–4)
CAST: 0 /LPF — SIGNIFICANT CHANGE UP (ref 0–4)
CHLORIDE SERPL-SCNC: 108 MMOL/L — SIGNIFICANT CHANGE UP (ref 96–108)
CHLORIDE SERPL-SCNC: 108 MMOL/L — SIGNIFICANT CHANGE UP (ref 96–108)
CHLORIDE SERPL-SCNC: 113 MMOL/L — HIGH (ref 96–108)
CHLORIDE SERPL-SCNC: 113 MMOL/L — HIGH (ref 96–108)
CO2 SERPL-SCNC: 24 MMOL/L — SIGNIFICANT CHANGE UP (ref 22–29)
CO2 SERPL-SCNC: 24 MMOL/L — SIGNIFICANT CHANGE UP (ref 22–29)
CO2 SERPL-SCNC: 26 MMOL/L — SIGNIFICANT CHANGE UP (ref 22–29)
CO2 SERPL-SCNC: 26 MMOL/L — SIGNIFICANT CHANGE UP (ref 22–29)
COLOR SPEC: YELLOW — SIGNIFICANT CHANGE UP
COLOR SPEC: YELLOW — SIGNIFICANT CHANGE UP
CREAT SERPL-MCNC: 1.48 MG/DL — HIGH (ref 0.5–1.3)
CREAT SERPL-MCNC: 1.48 MG/DL — HIGH (ref 0.5–1.3)
CREAT SERPL-MCNC: 1.6 MG/DL — HIGH (ref 0.5–1.3)
CREAT SERPL-MCNC: 1.6 MG/DL — HIGH (ref 0.5–1.3)
DIFF PNL FLD: NEGATIVE — SIGNIFICANT CHANGE UP
DIFF PNL FLD: NEGATIVE — SIGNIFICANT CHANGE UP
EGFR: 45 ML/MIN/1.73M2 — LOW
EGFR: 45 ML/MIN/1.73M2 — LOW
EGFR: 50 ML/MIN/1.73M2 — LOW
EGFR: 50 ML/MIN/1.73M2 — LOW
GLUCOSE SERPL-MCNC: 108 MG/DL — HIGH (ref 70–99)
GLUCOSE SERPL-MCNC: 108 MG/DL — HIGH (ref 70–99)
GLUCOSE SERPL-MCNC: 121 MG/DL — HIGH (ref 70–99)
GLUCOSE SERPL-MCNC: 121 MG/DL — HIGH (ref 70–99)
GLUCOSE UR QL: NEGATIVE MG/DL — SIGNIFICANT CHANGE UP
GLUCOSE UR QL: NEGATIVE MG/DL — SIGNIFICANT CHANGE UP
HCT VFR BLD CALC: 52.3 % — HIGH (ref 39–50)
HCT VFR BLD CALC: 52.3 % — HIGH (ref 39–50)
HGB BLD-MCNC: 17 G/DL — SIGNIFICANT CHANGE UP (ref 13–17)
HGB BLD-MCNC: 17 G/DL — SIGNIFICANT CHANGE UP (ref 13–17)
KETONES UR-MCNC: NEGATIVE MG/DL — SIGNIFICANT CHANGE UP
KETONES UR-MCNC: NEGATIVE MG/DL — SIGNIFICANT CHANGE UP
LACTATE BLDV-MCNC: 1.9 MMOL/L — SIGNIFICANT CHANGE UP (ref 0.5–2)
LACTATE BLDV-MCNC: 1.9 MMOL/L — SIGNIFICANT CHANGE UP (ref 0.5–2)
LEUKOCYTE ESTERASE UR-ACNC: ABNORMAL
LEUKOCYTE ESTERASE UR-ACNC: ABNORMAL
MCHC RBC-ENTMCNC: 32.5 GM/DL — SIGNIFICANT CHANGE UP (ref 32–36)
MCHC RBC-ENTMCNC: 32.5 GM/DL — SIGNIFICANT CHANGE UP (ref 32–36)
MCHC RBC-ENTMCNC: 32.5 PG — SIGNIFICANT CHANGE UP (ref 27–34)
MCHC RBC-ENTMCNC: 32.5 PG — SIGNIFICANT CHANGE UP (ref 27–34)
MCV RBC AUTO: 100 FL — SIGNIFICANT CHANGE UP (ref 80–100)
MCV RBC AUTO: 100 FL — SIGNIFICANT CHANGE UP (ref 80–100)
NITRITE UR-MCNC: NEGATIVE — SIGNIFICANT CHANGE UP
NITRITE UR-MCNC: NEGATIVE — SIGNIFICANT CHANGE UP
PH UR: 6 — SIGNIFICANT CHANGE UP (ref 5–8)
PH UR: 6 — SIGNIFICANT CHANGE UP (ref 5–8)
PLATELET # BLD AUTO: 165 K/UL — SIGNIFICANT CHANGE UP (ref 150–400)
PLATELET # BLD AUTO: 165 K/UL — SIGNIFICANT CHANGE UP (ref 150–400)
POTASSIUM SERPL-MCNC: 3.3 MMOL/L — LOW (ref 3.5–5.3)
POTASSIUM SERPL-MCNC: 3.3 MMOL/L — LOW (ref 3.5–5.3)
POTASSIUM SERPL-MCNC: 4.4 MMOL/L — SIGNIFICANT CHANGE UP (ref 3.5–5.3)
POTASSIUM SERPL-MCNC: 4.4 MMOL/L — SIGNIFICANT CHANGE UP (ref 3.5–5.3)
POTASSIUM SERPL-SCNC: 3.3 MMOL/L — LOW (ref 3.5–5.3)
POTASSIUM SERPL-SCNC: 3.3 MMOL/L — LOW (ref 3.5–5.3)
POTASSIUM SERPL-SCNC: 4.4 MMOL/L — SIGNIFICANT CHANGE UP (ref 3.5–5.3)
POTASSIUM SERPL-SCNC: 4.4 MMOL/L — SIGNIFICANT CHANGE UP (ref 3.5–5.3)
PROT SERPL-MCNC: 7.3 G/DL — SIGNIFICANT CHANGE UP (ref 6.6–8.7)
PROT SERPL-MCNC: 7.3 G/DL — SIGNIFICANT CHANGE UP (ref 6.6–8.7)
PROT UR-MCNC: NEGATIVE MG/DL — SIGNIFICANT CHANGE UP
PROT UR-MCNC: NEGATIVE MG/DL — SIGNIFICANT CHANGE UP
RAPID RVP RESULT: SIGNIFICANT CHANGE UP
RAPID RVP RESULT: SIGNIFICANT CHANGE UP
RBC # BLD: 5.23 M/UL — SIGNIFICANT CHANGE UP (ref 4.2–5.8)
RBC # BLD: 5.23 M/UL — SIGNIFICANT CHANGE UP (ref 4.2–5.8)
RBC # FLD: 14.1 % — SIGNIFICANT CHANGE UP (ref 10.3–14.5)
RBC # FLD: 14.1 % — SIGNIFICANT CHANGE UP (ref 10.3–14.5)
RBC CASTS # UR COMP ASSIST: 0 /HPF — SIGNIFICANT CHANGE UP (ref 0–4)
RBC CASTS # UR COMP ASSIST: 0 /HPF — SIGNIFICANT CHANGE UP (ref 0–4)
SARS-COV-2 RNA SPEC QL NAA+PROBE: SIGNIFICANT CHANGE UP
SARS-COV-2 RNA SPEC QL NAA+PROBE: SIGNIFICANT CHANGE UP
SODIUM SERPL-SCNC: 144 MMOL/L — SIGNIFICANT CHANGE UP (ref 135–145)
SODIUM SERPL-SCNC: 144 MMOL/L — SIGNIFICANT CHANGE UP (ref 135–145)
SODIUM SERPL-SCNC: 150 MMOL/L — HIGH (ref 135–145)
SODIUM SERPL-SCNC: 150 MMOL/L — HIGH (ref 135–145)
SP GR SPEC: 1.01 — SIGNIFICANT CHANGE UP (ref 1–1.03)
SP GR SPEC: 1.01 — SIGNIFICANT CHANGE UP (ref 1–1.03)
SQUAMOUS # UR AUTO: 1 /HPF — SIGNIFICANT CHANGE UP (ref 0–5)
SQUAMOUS # UR AUTO: 1 /HPF — SIGNIFICANT CHANGE UP (ref 0–5)
UROBILINOGEN FLD QL: 1 MG/DL — SIGNIFICANT CHANGE UP (ref 0.2–1)
UROBILINOGEN FLD QL: 1 MG/DL — SIGNIFICANT CHANGE UP (ref 0.2–1)
WBC # BLD: 6.52 K/UL — SIGNIFICANT CHANGE UP (ref 3.8–10.5)
WBC # BLD: 6.52 K/UL — SIGNIFICANT CHANGE UP (ref 3.8–10.5)
WBC # FLD AUTO: 6.52 K/UL — SIGNIFICANT CHANGE UP (ref 3.8–10.5)
WBC # FLD AUTO: 6.52 K/UL — SIGNIFICANT CHANGE UP (ref 3.8–10.5)
WBC UR QL: 1 /HPF — SIGNIFICANT CHANGE UP (ref 0–5)
WBC UR QL: 1 /HPF — SIGNIFICANT CHANGE UP (ref 0–5)

## 2023-12-03 PROCEDURE — 99283 EMERGENCY DEPT VISIT LOW MDM: CPT

## 2023-12-03 PROCEDURE — 70450 CT HEAD/BRAIN W/O DYE: CPT | Mod: 26,MA,77

## 2023-12-03 PROCEDURE — 99223 1ST HOSP IP/OBS HIGH 75: CPT

## 2023-12-03 PROCEDURE — 70450 CT HEAD/BRAIN W/O DYE: CPT | Mod: 26,MA

## 2023-12-03 RX ORDER — DESMOPRESSIN ACETATE 0.1 MG/1
0.2 TABLET ORAL ONCE
Refills: 0 | Status: COMPLETED | OUTPATIENT
Start: 2023-12-03 | End: 2023-12-03

## 2023-12-03 RX ORDER — FERROUS SULFATE 325(65) MG
325 TABLET ORAL DAILY
Refills: 0 | Status: DISCONTINUED | OUTPATIENT
Start: 2023-12-03 | End: 2023-12-10

## 2023-12-03 RX ORDER — ASPIRIN/CALCIUM CARB/MAGNESIUM 324 MG
81 TABLET ORAL DAILY
Refills: 0 | Status: DISCONTINUED | OUTPATIENT
Start: 2023-12-03 | End: 2023-12-04

## 2023-12-03 RX ORDER — METOCLOPRAMIDE HCL 10 MG
5 TABLET ORAL THREE TIMES A DAY
Refills: 0 | Status: DISCONTINUED | OUTPATIENT
Start: 2023-12-03 | End: 2023-12-10

## 2023-12-03 RX ORDER — METFORMIN HYDROCHLORIDE 850 MG/1
1000 TABLET ORAL
Refills: 0 | Status: DISCONTINUED | OUTPATIENT
Start: 2023-12-03 | End: 2023-12-10

## 2023-12-03 RX ORDER — DIVALPROEX SODIUM 500 MG/1
500 TABLET, DELAYED RELEASE ORAL AT BEDTIME
Refills: 0 | Status: DISCONTINUED | OUTPATIENT
Start: 2023-12-03 | End: 2023-12-10

## 2023-12-03 RX ORDER — ALPRAZOLAM 0.25 MG
0.5 TABLET ORAL EVERY 6 HOURS
Refills: 0 | Status: DISCONTINUED | OUTPATIENT
Start: 2023-12-03 | End: 2023-12-03

## 2023-12-03 RX ORDER — ACETAMINOPHEN 500 MG
1000 TABLET ORAL ONCE
Refills: 0 | Status: COMPLETED | OUTPATIENT
Start: 2023-12-03 | End: 2023-12-03

## 2023-12-03 RX ORDER — HYDROCORTISONE 20 MG
10 TABLET ORAL AT BEDTIME
Refills: 0 | Status: DISCONTINUED | OUTPATIENT
Start: 2023-12-03 | End: 2023-12-10

## 2023-12-03 RX ORDER — SODIUM CHLORIDE 9 MG/ML
1000 INJECTION INTRAMUSCULAR; INTRAVENOUS; SUBCUTANEOUS ONCE
Refills: 0 | Status: COMPLETED | OUTPATIENT
Start: 2023-12-03 | End: 2023-12-03

## 2023-12-03 RX ORDER — POTASSIUM CHLORIDE 20 MEQ
40 PACKET (EA) ORAL ONCE
Refills: 0 | Status: COMPLETED | OUTPATIENT
Start: 2023-12-03 | End: 2023-12-03

## 2023-12-03 RX ORDER — ACETAMINOPHEN 500 MG
650 TABLET ORAL EVERY 6 HOURS
Refills: 0 | Status: DISCONTINUED | OUTPATIENT
Start: 2023-12-03 | End: 2023-12-10

## 2023-12-03 RX ORDER — LEVOTHYROXINE SODIUM 125 MCG
150 TABLET ORAL DAILY
Refills: 0 | Status: DISCONTINUED | OUTPATIENT
Start: 2023-12-03 | End: 2023-12-10

## 2023-12-03 RX ORDER — PANTOPRAZOLE SODIUM 20 MG/1
40 TABLET, DELAYED RELEASE ORAL
Refills: 0 | Status: DISCONTINUED | OUTPATIENT
Start: 2023-12-03 | End: 2023-12-10

## 2023-12-03 RX ORDER — HYDROCORTISONE 20 MG
15 TABLET ORAL DAILY
Refills: 0 | Status: DISCONTINUED | OUTPATIENT
Start: 2023-12-03 | End: 2023-12-10

## 2023-12-03 RX ORDER — TAMSULOSIN HYDROCHLORIDE 0.4 MG/1
0.8 CAPSULE ORAL AT BEDTIME
Refills: 0 | Status: DISCONTINUED | OUTPATIENT
Start: 2023-12-03 | End: 2023-12-10

## 2023-12-03 RX ORDER — ATORVASTATIN CALCIUM 80 MG/1
40 TABLET, FILM COATED ORAL AT BEDTIME
Refills: 0 | Status: DISCONTINUED | OUTPATIENT
Start: 2023-12-03 | End: 2023-12-10

## 2023-12-03 RX ORDER — TOPIRAMATE 25 MG
100 TABLET ORAL
Refills: 0 | Status: DISCONTINUED | OUTPATIENT
Start: 2023-12-03 | End: 2023-12-10

## 2023-12-03 RX ORDER — CHOLECALCIFEROL (VITAMIN D3) 125 MCG
2000 CAPSULE ORAL DAILY
Refills: 0 | Status: DISCONTINUED | OUTPATIENT
Start: 2023-12-03 | End: 2023-12-10

## 2023-12-03 RX ORDER — METOPROLOL TARTRATE 50 MG
25 TABLET ORAL
Refills: 0 | Status: DISCONTINUED | OUTPATIENT
Start: 2023-12-03 | End: 2023-12-10

## 2023-12-03 RX ORDER — VENLAFAXINE HCL 75 MG
150 CAPSULE, EXT RELEASE 24 HR ORAL DAILY
Refills: 0 | Status: DISCONTINUED | OUTPATIENT
Start: 2023-12-03 | End: 2023-12-10

## 2023-12-03 RX ORDER — DESMOPRESSIN ACETATE 0.1 MG/1
0.25 TABLET ORAL
Refills: 0 | Status: DISCONTINUED | OUTPATIENT
Start: 2023-12-03 | End: 2023-12-10

## 2023-12-03 RX ORDER — SUCRALFATE 1 G
1 TABLET ORAL
Refills: 0 | Status: DISCONTINUED | OUTPATIENT
Start: 2023-12-03 | End: 2023-12-10

## 2023-12-03 RX ADMIN — SODIUM CHLORIDE 250 MILLILITER(S): 9 INJECTION INTRAMUSCULAR; INTRAVENOUS; SUBCUTANEOUS at 10:20

## 2023-12-03 RX ADMIN — Medication 400 MILLIGRAM(S): at 10:41

## 2023-12-03 RX ADMIN — ATORVASTATIN CALCIUM 40 MILLIGRAM(S): 80 TABLET, FILM COATED ORAL at 22:41

## 2023-12-03 RX ADMIN — DESMOPRESSIN ACETATE 0.2 MILLIGRAM(S): 0.1 TABLET ORAL at 14:32

## 2023-12-03 RX ADMIN — TAMSULOSIN HYDROCHLORIDE 0.8 MILLIGRAM(S): 0.4 CAPSULE ORAL at 22:41

## 2023-12-03 RX ADMIN — Medication 40 MILLIEQUIVALENT(S): at 18:45

## 2023-12-03 RX ADMIN — DESMOPRESSIN ACETATE 0.25 MILLIGRAM(S): 0.1 TABLET ORAL at 23:01

## 2023-12-03 RX ADMIN — Medication 650 MILLIGRAM(S): at 22:41

## 2023-12-03 RX ADMIN — DIVALPROEX SODIUM 500 MILLIGRAM(S): 500 TABLET, DELAYED RELEASE ORAL at 22:41

## 2023-12-03 RX ADMIN — Medication 0.5 MILLIGRAM(S): at 23:04

## 2023-12-03 RX ADMIN — Medication 5 MILLIGRAM(S): at 22:41

## 2023-12-03 RX ADMIN — Medication 10 MILLIGRAM(S): at 22:41

## 2023-12-03 NOTE — ED CDU PROVIDER INITIAL DAY NOTE - PHYSICAL EXAMINATION
· CONSTITUTIONAL: Well appearing, awake, alert, oriented to person, place, time/situation and in no apparent distress.  · ENMT: Airway patent, Nasal mucosa clear. Mouth with normal mucosa. Throat has no vesicles, no oropharyngeal exudates and uvula is midline.  · CARDIAC: Normal rate, regular rhythm.  Heart sounds S1, S2.  No murmurs, rubs or gallops.  · RESPIRATORY: Breath sounds clear and equal bilaterally.  · GASTROINTESTINAL: Abdomen soft, non-tender, no guarding.  · GENITOURINARY: No discharge, lesions.  · MUSCULOSKELETAL: Spine appears normal, range of motion is not limited, no muscle or joint tenderness  NEURO: A&O x 3, CN II-Xii GI, MAEx 4,  5/5/ motors, = sensation, no pronator drift or ataxia.

## 2023-12-03 NOTE — ED ADULT NURSE NOTE - OBJECTIVE STATEMENT
Pt presents to ED for generalized abd pain, "mild fever" x 2 days. Pt states " I'm just not feeling well"

## 2023-12-03 NOTE — ED PROVIDER NOTE - CLINICAL SUMMARY MEDICAL DECISION MAKING FREE TEXT BOX
Anthony PINZON: Received patient in signout, patient feeling better on reassessment.  Repeat CT head unchanged.  Discussed with neurosurgery, requesting MRI.  Will place in observation for MRI.

## 2023-12-03 NOTE — ED CDU PROVIDER INITIAL DAY NOTE - OBJECTIVE STATEMENT
73-year-old male past medical history of brain tumor diabetes insipidus diabetes diabetes DVT hyperlipidemia hypothyroid obstructive sleep apnea PE comes to the ED with subjective fever up to 98 upper respiratory symptoms including nasal congestion and cough accompanied with his wife.  Denies any sick contacts at this time patient does complain of headache which he has had in the past x 3 days.  Patient has been drinking significant amount of water recently and going to the bathroom as per his wife patient is followed by Dr. Precious torres at [facility] patient with a last MRI done March 2023 73-year-old male past medical history of brain tumor diabetes insipidus diabetes diabetes DVT hyperlipidemia hypothyroid obstructive sleep apnea PE comes to the ED with subjective fever up to 98 upper respiratory symptoms including nasal congestion and cough accompanied with his wife.  Denies any sick contacts at this time patient does complain of headache which he has had in the past x 3 days.  Patient has been drinking significant amount of water recently and going to the bathroom as per his wife patient is followed by Dr. Precious torres at [facility] patient with a last MRI done March 2023

## 2023-12-03 NOTE — CONSULT NOTE ADULT - NSCONSULTADDITIONALINFOA_GEN_ALL_CORE
NSGY Attg:    see above    patient seen and examined by PA staff    imaging reviewed    agree with plan as above  will need MRI brain to distinguish SDH from post-op change to assess risk versus benefit profile of resuming Xarelto

## 2023-12-03 NOTE — ED PROVIDER NOTE - CARE PLAN
Principal Discharge DX:	At risk for headache  Secondary Diagnosis:	Urinary tract infection  Secondary Diagnosis:	Upper respiratory tract infection   1

## 2023-12-03 NOTE — ED CDU PROVIDER INITIAL DAY NOTE - DETAILS
Pt presented to the ED for generally not feeling well and HA, Pt with improved symptomology but indecently found to have abnormal Ct, placed in obs for further imaging to evaluate CT abnormality.

## 2023-12-03 NOTE — ED ADULT NURSE NOTE - NSFALLUNIVINTERV_ED_ALL_ED
Bed/Stretcher in lowest position, wheels locked, appropriate side rails in place/Call bell, personal items and telephone in reach/Instruct patient to call for assistance before getting out of bed/chair/stretcher/Non-slip footwear applied when patient is off stretcher/[place] to call system/Physically safe environment - no spills, clutter or unnecessary equipment/Purposeful proactive rounding/Room/bathroom lighting operational, light cord in reach Bed/Stretcher in lowest position, wheels locked, appropriate side rails in place/Call bell, personal items and telephone in reach/Instruct patient to call for assistance before getting out of bed/chair/stretcher/Non-slip footwear applied when patient is off stretcher/[place] to call system/Physically safe environment - no spills, clutter or unnecessary equipment/Purposeful proactive rounding/Room/bathroom lighting operational, light cord in reach Bed/Stretcher in lowest position, wheels locked, appropriate side rails in place/Call bell, personal items and telephone in reach/Instruct patient to call for assistance before getting out of bed/chair/stretcher/Non-slip footwear applied when patient is off stretcher/[place] to call system/Physically safe environment - no spills, clutter or unnecessary equipment/Purposeful proactive rounding/Room/bathroom lighting operational, light cord in reach

## 2023-12-03 NOTE — ED CDU PROVIDER INITIAL DAY NOTE - CLINICAL SUMMARY MEDICAL DECISION MAKING FREE TEXT BOX
Pt requiring further imaging   placed in obs for Diagnostic uncertainty. PT seen BY OBS PA found to be appropriate CDU PT care transferred to PA.

## 2023-12-03 NOTE — ED PROVIDER NOTE - MUSCULOSKELETAL NEGATIVE STATEMENT, MLM
Emily Espinoza is a 53 year old here for annual physical.    She currently reports: pt no longer sees SW for depression since she has been doing much better.  Has rash in groin area, +itchy, she has used Triamcinolne cream but did not help much. Also has discolored, irregular toenail on R great toe.  Last pap 2/2020- negative, last mammogram 11/2020 negative. Has h/o CIS and had LEEP done approx 2000. Pt has h/o benign lump R breast.  Colonoscopy 9/19- hyperplastic polyp, due in 5 years per GI- (9/24).  She does not wear glasses or contacts, although she reports that she should be using glasses, last eye exam 1-2 yrs ago.  She has not received COVID-19 vaccine. Has not had Shingrix, she reports having a chicken pox vaccine in [place].      Body mass index is 36.05 kg/m².    BMI ASSESSMENT/PLAN:  Patient is obese.    30-60 minutes of physical activity a day         Does patient exercise? Patient does not exercise.    Depression Screening:  Over the past 2 weeks, has patient felt down, depressed or hopeless? No  Over the past 2 weeks, has patient felt little interest or pleasure in doing things? No      Past Medical History:   Diagnosis Date   • Depression 2/5/2020     Past Surgical History:   Procedure Laterality Date   • Cholecystectomy  1990    open cholecystectomy, in [place]   • Tubal ligation     • [place] tooth extraction     -LEEP- due to CIS- approx 2000  -mass removed from under L arm- 1983    No current outpatient medications on file.     No current facility-administered medications for this visit.   -MVI- 1-2 x/week    ALLERGIES:   Allergen Reactions   • Nsaids Other (See Comments)     Epigastric pain   • Sulfa Antibiotics RASH and SWELLING        Family History   Problem Relation Age of Onset   • Heart Mother         pacemaker   • Cancer Father 61        metastatic, possibly pancreas primary   • Patient is unaware of any medical problems Sister    • Patient is unaware of any medical problems  Brother    • Patient is unaware of any medical problems Daughter    • Patient is unaware of any medical problems Son    • Diabetes Brother    • Patient is unaware of any medical problems Daughter    -no breast cancer  -no colon cancer  +HTN- maternal GM    Social History     Tobacco Use   • Smoking status: Never Smoker   • Smokeless tobacco: Never Used   Substance Use Topics   • Alcohol use: Yes- seldom   • Drug use: Never     - (   from [place]), 3 children   -work-call center   -exercise- not regular    ROS:    Review of Systems   Constitutional: Negative for chills and fever.   HENT: Negative for ear pain, hearing loss, nosebleeds, sore throat and trouble swallowing.    Eyes: Negative for pain and visual disturbance.   Respiratory: Negative for cough, shortness of breath and wheezing.    Cardiovascular: Negative for chest pain, palpitations and leg swelling.   Gastrointestinal: Negative for abdominal pain, anal bleeding, blood in stool, diarrhea and vomiting.   Genitourinary: Negative for difficulty urinating, dysuria and hematuria.   Musculoskeletal: Negative for gait problem and joint swelling.   Skin: Negative for rash and wound.   Neurological: Negative for dizziness, seizures, syncope, weakness, light-headedness, numbness and headaches.   Psychiatric/Behavioral: Negative for confusion and hallucinations.          Physical Exam:  Visit Vitals  /86   Pulse 86   Temp 96.5 °F (35.8 °C) (Tympanic)   Ht 5' 4\" (1.626 m)   Wt 95.3 kg (210 lb)   BMI 36.05 kg/m²       Physical Exam  Constitutional:       General: She is not in acute distress.     Appearance: Normal appearance. She is not ill-appearing.   HENT:      Head: Normocephalic and atraumatic.      Right Ear: Tympanic membrane, ear canal and external ear normal. There is no impacted cerumen.      Left Ear: Tympanic membrane, ear canal and external ear normal. There is no impacted cerumen.      Nose: Nose normal. No congestion.       Mouth/Throat:      Mouth: Mucous membranes are moist.      Pharynx: Oropharynx is clear. No oropharyngeal exudate or posterior oropharyngeal erythema.   Eyes:      General: No scleral icterus.        Right eye: No discharge.         Left eye: No discharge.      Extraocular Movements: Extraocular movements intact.      Conjunctiva/sclera: Conjunctivae normal.      Pupils: Pupils are equal, round, and reactive to light.   Neck:      Vascular: No carotid bruit.   Cardiovascular:      Rate and Rhythm: Normal rate and regular rhythm.      Pulses: Normal pulses.      Heart sounds: Normal heart sounds. No murmur.   Pulmonary:      Effort: Pulmonary effort is normal. No respiratory distress.      Breath sounds: Normal breath sounds. No stridor. No wheezing, rhonchi or rales.   Abdominal:      General: Bowel sounds are normal.      Palpations: Abdomen is soft. There is no mass.      Tenderness: There is no abdominal tenderness. There is no right CVA tenderness, left CVA tenderness, guarding or rebound.   Musculoskeletal:         General: Normal range of motion.      Cervical back: Normal range of motion and neck supple. No rigidity or tenderness.      Right lower leg: No edema.      Left lower leg: No edema.   Lymphadenopathy:      Cervical: No cervical adenopathy.   Skin:     General: Skin is warm.      Coloration: Skin is not jaundiced.      Findings: Rash present. No bruising or erythema.      Comments: -yellowish discoloration and irregular toenail R great toe.  -erythematous rash R groin area.   Neurological:      General: No focal deficit present.      Mental Status: She is alert and oriented to person, place, and time. Mental status is at baseline.      Cranial Nerves: No cranial nerve deficit.      Sensory: No sensory deficit.      Motor: No weakness.      Coordination: Coordination normal.      Gait: Gait normal.      Deep Tendon Reflexes: Reflexes normal.   Psychiatric:         Mood and Affect: Mood normal.          Behavior: Behavior normal.         Thought Content: Thought content normal.         Judgment: Judgment normal.              Diagnoses and all orders for this visit:    Routine general medical examination at a health care facility    Onychomycosis    Rash    Varicella vaccination status unknown  -     VARICELLA ZOSTER IMMUNITY IGG; Future    Other orders  -     efinaconazole 10 % topical solution; Apply topically daily.  -     nystatin-triamcinolone (MYCOLOG II) 385621-0.1 UNIT/GM-% cream; Apply topically 2 times daily.            Patient Instructions   Do blood test today.    Use cream for rash twice daily. Let me know if it is not helping after a couple weeks.    Use solution on toenail daily. You will likely need to use the solution for at least a few months.    See me in a year for next physical, sooner if needed.    -next colonoscopy due 5 yrs (9/24)       no back pain, no gout, no musculoskeletal pain, no neck pain, and no weakness.

## 2023-12-03 NOTE — CONSULT NOTE ADULT - SUBJECTIVE AND OBJECTIVE BOX
HPI: 73-year-old male past medical history of brain tumor, DI, DVT/PE on Xarelto, HLD, hypothyroid, OPA  comes to the ED with subjective fever up to 98 upper respiratory symptoms including nasal congestion and cough accompanied with his wife.  Denies trauma or any sick contacts at this time, but patient does complain of headache, rated 3/10, which he has had in the past x 3 days. Pt had sellar pituitary tumor resected by Dr. Faustin from [facility] in  by transphenoidal and R pteronial craniotomy approach. Neurosurgery consulted for mildly thicker mixed density collection in the extra-axial R pteronial craniotomy site found on CTH to rule out SDH vs dural thickening.    PAST MEDICAL & SURGICAL HISTORY:  PE (pulmonary thromboembolism)  DVT (deep venous thrombosis)  DM (diabetes mellitus)  Diabetes insipidus  HLD (hyperlipidemia)  THOMAS (obstructive sleep apnea)  Hypothyroid  Intractable tension-type headache  Brain tumor  S/P craniotomy  S/P IVC filter    Allergies  No Known Allergies    REVIEW OF SYSTEMS  As noted in HPI    HOME MEDICATIONS:  Home Medications:  acetaminophen 325 mg oral tablet: 2 tab(s) orally every 6 hours, As needed, Mild Pain (1 - 3) (2022 12:45)  Aspirin Enteric Coated 81 mg oral delayed release tablet: 1 tab(s) orally once a day (2022 12:45)  atorvastatin 40 mg oral tablet: 1 tab(s) orally once a day (2022 12:45)  desmopressin 0.1 mg oral tablet: 1 tab(s) orally once a day (in the evening) (2022 12:45)  ferrous sulfate 325 mg (65 mg elemental iron) oral tablet: 1 tab(s) orally once a day (2022 12:45)  furosemide 20 mg oral tablet: 1 tab(s) orally once a day (2022 12:45)  hydrocortisone 5 mg oral tablet: 3 tab(s) orally once a day (in the morning) (2022 12:45)  hydrocortisone 5 mg oral tablet: 1 tab(s) orally once a day (in the evening) (2022 12:45)  levETIRAcetam 500 mg oral tablet, extended release: 1 tab(s) orally once a day (at bedtime) (2022 12:45)  levothyroxine 137 mcg (0.137 mg) oral tablet: 1 tab(s) orally once a day (2022 12:45)  metFORMIN 1000 mg oral tablet: 1 tab(s) orally 2 times a day (2022 12:45)  metoclopramide 10 mg oral tablet: 1 tab(s) orally once a day (2022 12:45)  pantoprazole 40 mg oral delayed release tablet: 1 tab(s) orally once a day (2022 12:45)  potassium chloride 10 mEq oral capsule, extended release: 1 cap(s) orally once a day (2022 12:45)  tamsulosin 0.4 mg oral capsule: 2 cap(s) orally once a day (at bedtime) (2022 12:45)  topiramate 50 mg oral tablet: 2 tab(s) orally once a day (at bedtime) (2022 12:45)  topiramate 50 mg oral tablet: 1 tab(s) orally once a day (in the morning) Need to follow up with neuro in 1 week to discuss if need to increase am dose to 2 tabs  (2022 12:45)  Trulicity Pen 1.5 mg/0.5 mL subcutaneous solution: Inject 1.5mg subcutaneously once a week on Mon (2022 12:45)  venlafaxine 150 mg oral capsule, extended release: 1 cap(s) orally once a day (2022 12:45)  Vitamin D3 5000 intl units (125 mcg) oral tablet: 1 tab(s) orally once a day (2022 12:45)  Xarelto 20 mg oral tablet: 1 tab(s) orally once a day (in the evening) (2022 12:45)  Xyosted 75 mg/0.5 mL subcutaneous solution: Inject 75mg subcutaneously once a week on Thursday (2022 12:45)    Vital Signs Last 24 Hrs  T(C): 36.8 (03 Dec 2023 15:19), Max: 36.8 (03 Dec 2023 15:19)  T(F): 98.2 (03 Dec 2023 15:19), Max: 98.2 (03 Dec 2023 15:19)  HR: 90 (03 Dec 2023 15:19) (87 - 101)  BP: 118/73 (03 Dec 2023 15:19) (118/73 - 131/84)  BP(mean): --  RR: 18 (03 Dec 2023 15:19) (17 - 18)  SpO2: 100% (03 Dec 2023 15:19) (95% - 100%)    Parameters below as of 03 Dec 2023 15:19  Patient On (Oxygen Delivery Method): room air    PHYSICAL EXAM:  GENERAL: NAD, well-groomed  HEAD:  Atraumatic, normocephalic  EYES: Conjunctiva and sclera clear  TA COMA SCORE: E- V- M- = 15  MENTAL STATUS: AAO x3; Opens eyes spontaneously; Appropriately conversant without aphasia; following simple commands  CRANIAL NERVES: Visual acuity normal for age, PERRL. EOMI without nystagmus. Facial sensation intact V1-3 distribution b/l. Face symmetric w/ normal eye closure and smile, tongue midline. Hearing grossly intact. Speech clear. Head turning and shoulder shrug intact.   MOTOR: strength 5/5 b/l upper and lower extremities  SENSATION: grossly intact to light touch all extremities  SKIN: Warm, dry; no rashes or lesions    LABS:                        17.0   6.52  )-----------( 165      ( 03 Dec 2023 09:50 )             52.3     12-03    150<H>  |  113<H>  |  17.3  ----------------------------<  121<H>  4.4   |  24.0  |  1.60<H>    Ca    9.7      03 Dec 2023 09:50    TPro  7.3  /  Alb  4.0  /  TBili  0.4  /  DBili  x   /  AST  46<H>  /  ALT  77<H>  /  AlkPhos  82  12-03      Urinalysis Basic - ( 03 Dec 2023 10:51 )    Color: Yellow / Appearance: Clear / S.010 / pH: x  Gluc: x / Ketone: Negative mg/dL  / Bili: Negative / Urobili: 1.0 mg/dL   Blood: x / Protein: Negative mg/dL / Nitrite: Negative   Leuk Esterase: Trace / RBC: 0 /HPF / WBC 1 /HPF   Sq Epi: x / Non Sq Epi: 1 /HPF / Bacteria: Negative /HPF      RADIOLOGY & ADDITIONAL STUDIES:    < from: CT Head No Cont (23 @ 10:16) >  IMPRESSION:    1.  Mixed density extra-axial collection deep to the right pterional   craniotomy site, mildly increased in thickness when compared to prior   imaging. Finding could represent a subdural hematoma. Alternatively   postoperative changes/dural thickening could present similarly. Recommend   close interval imaging for further evaluation.  2.  Chronic small vessel disease. HPI: 73-year-old male past medical history of brain tumor, DI, DVT/PE on Xarelto, HLD, hypothyroid, OPA  comes to the ED with subjective fever up to 98 upper respiratory symptoms including nasal congestion and cough accompanied with his wife.  Denies trauma or any sick contacts at this time, but patient does complain of headache, rated 3/10, which he has had in the past x 3 days. Pt had sellar pituitary tumor resected by Dr. Faustin from [facility] in  by transphenoidal and R pteronial craniotomy approach. Neurosurgery consulted for mildly thicker mixed density collection in the extra-axial R pteronial craniotomy site found on CTH to rule out SDH vs dural thickening.    PAST MEDICAL & SURGICAL HISTORY:  PE (pulmonary thromboembolism)  DVT (deep venous thrombosis)  DM (diabetes mellitus)  Diabetes insipidus  HLD (hyperlipidemia)  THOMAS (obstructive sleep apnea)  Hypothyroid  Intractable tension-type headache  Brain tumor  S/P craniotomy  S/P IVC filter    Allergies  No Known Allergies    REVIEW OF SYSTEMS  As noted in HPI    HOME MEDICATIONS:  Home Medications:  acetaminophen 325 mg oral tablet: 2 tab(s) orally every 6 hours, As needed, Mild Pain (1 - 3) (2022 12:45)  Aspirin Enteric Coated 81 mg oral delayed release tablet: 1 tab(s) orally once a day (2022 12:45)  atorvastatin 40 mg oral tablet: 1 tab(s) orally once a day (2022 12:45)  desmopressin 0.1 mg oral tablet: 1 tab(s) orally once a day (in the evening) (2022 12:45)  ferrous sulfate 325 mg (65 mg elemental iron) oral tablet: 1 tab(s) orally once a day (2022 12:45)  furosemide 20 mg oral tablet: 1 tab(s) orally once a day (2022 12:45)  hydrocortisone 5 mg oral tablet: 3 tab(s) orally once a day (in the morning) (2022 12:45)  hydrocortisone 5 mg oral tablet: 1 tab(s) orally once a day (in the evening) (2022 12:45)  levETIRAcetam 500 mg oral tablet, extended release: 1 tab(s) orally once a day (at bedtime) (2022 12:45)  levothyroxine 137 mcg (0.137 mg) oral tablet: 1 tab(s) orally once a day (2022 12:45)  metFORMIN 1000 mg oral tablet: 1 tab(s) orally 2 times a day (2022 12:45)  metoclopramide 10 mg oral tablet: 1 tab(s) orally once a day (2022 12:45)  pantoprazole 40 mg oral delayed release tablet: 1 tab(s) orally once a day (2022 12:45)  potassium chloride 10 mEq oral capsule, extended release: 1 cap(s) orally once a day (2022 12:45)  tamsulosin 0.4 mg oral capsule: 2 cap(s) orally once a day (at bedtime) (2022 12:45)  topiramate 50 mg oral tablet: 2 tab(s) orally once a day (at bedtime) (2022 12:45)  topiramate 50 mg oral tablet: 1 tab(s) orally once a day (in the morning) Need to follow up with neuro in 1 week to discuss if need to increase am dose to 2 tabs  (2022 12:45)  Trulicity Pen 1.5 mg/0.5 mL subcutaneous solution: Inject 1.5mg subcutaneously once a week on Mon (2022 12:45)  venlafaxine 150 mg oral capsule, extended release: 1 cap(s) orally once a day (2022 12:45)  Vitamin D3 5000 intl units (125 mcg) oral tablet: 1 tab(s) orally once a day (2022 12:45)  Xarelto 20 mg oral tablet: 1 tab(s) orally once a day (in the evening) (2022 12:45)  Xyosted 75 mg/0.5 mL subcutaneous solution: Inject 75mg subcutaneously once a week on Thursday (2022 12:45)    Vital Signs Last 24 Hrs  T(C): 36.8 (03 Dec 2023 15:19), Max: 36.8 (03 Dec 2023 15:19)  T(F): 98.2 (03 Dec 2023 15:19), Max: 98.2 (03 Dec 2023 15:19)  HR: 90 (03 Dec 2023 15:19) (87 - 101)  BP: 118/73 (03 Dec 2023 15:19) (118/73 - 131/84)  BP(mean): --  RR: 18 (03 Dec 2023 15:19) (17 - 18)  SpO2: 100% (03 Dec 2023 15:19) (95% - 100%)    Parameters below as of 03 Dec 2023 15:19  Patient On (Oxygen Delivery Method): room air    PHYSICAL EXAM:  GENERAL: NAD, well-groomed  HEAD:  Atraumatic, normocephalic  EYES: Conjunctiva and sclera clear  TA COMA SCORE: E- V- M- = 15  MENTAL STATUS: AAO x3; Opens eyes spontaneously; Appropriately conversant without aphasia; following simple commands  CRANIAL NERVES: Visual acuity normal for age, PERRL. EOMI without nystagmus. Facial sensation intact V1-3 distribution b/l. Face symmetric w/ normal eye closure and smile, tongue midline. Hearing grossly intact. Speech clear. Head turning and shoulder shrug intact.   MOTOR: strength 5/5 b/l upper and lower extremities  SENSATION: grossly intact to light touch all extremities  SKIN: Warm, dry; no rashes or lesions    LABS:                        17.0   6.52  )-----------( 165      ( 03 Dec 2023 09:50 )             52.3     12-03    150<H>  |  113<H>  |  17.3  ----------------------------<  121<H>  4.4   |  24.0  |  1.60<H>    Ca    9.7      03 Dec 2023 09:50    TPro  7.3  /  Alb  4.0  /  TBili  0.4  /  DBili  x   /  AST  46<H>  /  ALT  77<H>  /  AlkPhos  82  12-03      Urinalysis Basic - ( 03 Dec 2023 10:51 )    Color: Yellow / Appearance: Clear / S.010 / pH: x  Gluc: x / Ketone: Negative mg/dL  / Bili: Negative / Urobili: 1.0 mg/dL   Blood: x / Protein: Negative mg/dL / Nitrite: Negative   Leuk Esterase: Trace / RBC: 0 /HPF / WBC 1 /HPF   Sq Epi: x / Non Sq Epi: 1 /HPF / Bacteria: Negative /HPF      RADIOLOGY & ADDITIONAL STUDIES:    < from: CT Head No Cont (23 @ 10:16) >  IMPRESSION:    1.  Mixed density extra-axial collection deep to the right pterional   craniotomy site, mildly increased in thickness when compared to prior   imaging. Finding could represent a subdural hematoma. Alternatively   postoperative changes/dural thickening could present similarly. Recommend   close interval imaging for further evaluation.  2.  Chronic small vessel disease.

## 2023-12-03 NOTE — ED PROVIDER NOTE - OBJECTIVE STATEMENT
73-year-old male past medical history of brain tumor diabetes insipidus diabetes diabetes DVT hyperlipidemia hypothyroid obstructive sleep apnea PE comes to the ED with subjective fever up to 98 upper respiratory symptoms including nasal congestion and cough accompanied with his wife.  Denies any sick contacts at this time patient does complain of headache which he has had in the past x 3 days.  Patient has been drinking significant amount of water recently and going to the bathroom as per his wife patient is followed by Dr. Precious torres at [facility] patient with a last MRI done March 2023. 73-year-old male past medical history of brain tumor diabetes insipidus diabetes diabetes DVT hyperlipidemia hypothyroid obstructive sleep apnea PE comes to the ED with subjective fever up to 98 upper respiratory symptoms including nasal congestion and cough accompanied with his wife.  Denies any sick contacts at this time patient does complain of headache which he has had in the past x 3 days.  Patient has been drinking significant amount of water recently and going to the bathroom as per his wife patient is followed by Dr. Precious torres at [facility] patient with a last MRI done March 2023.

## 2023-12-03 NOTE — ED CDU PROVIDER INITIAL DAY NOTE - NS ED ATTENDING STATEMENT MOD
This was a shared visit with the SHARIMLA. I reviewed and verified the documentation and independently performed the documented: This was a shared visit with the SHARMILA. I reviewed and verified the documentation and independently performed the documented:

## 2023-12-03 NOTE — CONSULT NOTE ADULT - ASSESSMENT
3-year-old male past medical history of brain tumor, DI, DVT/PE on Xarelto. Denies trauma or any sick contacts at this time, but patient does complain of headache, rated 3/10, which he has had in the past x 3 days. Pt had sellar pituitary tumor resected by Dr. Faustin from [facility] in 2014/2015 by transphenoidal and R pteronial craniotomy approach. Neurosurgery consulted for mildly thicker mixed density collection in the extra-axial R pteronial craniotomy site found on CTH to rule out SDH vs dural thickening.    Plan    - Repeat CTH in 6 hours (now) to assess for stability  - Q4 neurochecks  - Hold AC/AP until stability scan  - Hold chemical DVT ppx; SCDs okay  - Pain control PRN  - Care management per primary team  - D/w Dr. Tim 3-year-old male past medical history of brain tumor, DI, DVT/PE on Xarelto. Denies trauma or any sick contacts at this time, but patient does complain of headache, rated 3/10, which he has had in the past x 3 days. Pt had sellar pituitary tumor resected by Dr. Faustin from [facility] in 2014/2015 by transphenoidal and R pteronial craniotomy approach. Neurosurgery consulted for mildly thicker mixed density collection in the extra-axial R pteronial craniotomy site found on CTH to rule out SDH vs dural thickening.    Plan    - Repeat CTH in 6 hours (now) to assess for stability  - Q4 neurochecks  - Hold AC/AP until stability scan  - Hold chemical DVT ppx; SCDs okay  - Pain control PRN  - Care management per primary team  - D/w Dr. Tim

## 2023-12-03 NOTE — ED PROVIDER NOTE - IV ALTEPLASE INCLUSION HIDDEN
. Bria Richardson called into the office this morning and unfortunately he can no longer afford Xarelto. He has been on Eliquis in the past and also could not afford this. I offered financially assistance papers but he was not interested in doing this. He has been on Warfarin in the past and he was not opposed to going back on this despite the INR checks and increase risk of bleeding vs the newer agents like Xarelto at this time. I still told him that I will ask Dr. Amanuel Elias of course first and see what he suggests. Thanks! show

## 2023-12-03 NOTE — ED ADULT TRIAGE NOTE - CHIEF COMPLAINT QUOTE
pt c/o abdominal pain & headache x 3 days, + nausea, no Vomiting or diarrhea, HX DM  A&Ox3, resp wnl

## 2023-12-04 VITALS
HEART RATE: 91 BPM | TEMPERATURE: 98 F | SYSTOLIC BLOOD PRESSURE: 100 MMHG | RESPIRATION RATE: 18 BRPM | DIASTOLIC BLOOD PRESSURE: 58 MMHG | OXYGEN SATURATION: 95 %

## 2023-12-04 LAB
ANION GAP SERPL CALC-SCNC: 11 MMOL/L — SIGNIFICANT CHANGE UP (ref 5–17)
ANION GAP SERPL CALC-SCNC: 11 MMOL/L — SIGNIFICANT CHANGE UP (ref 5–17)
BUN SERPL-MCNC: 18.3 MG/DL — SIGNIFICANT CHANGE UP (ref 8–20)
BUN SERPL-MCNC: 18.3 MG/DL — SIGNIFICANT CHANGE UP (ref 8–20)
CALCIUM SERPL-MCNC: 8.3 MG/DL — LOW (ref 8.4–10.5)
CALCIUM SERPL-MCNC: 8.3 MG/DL — LOW (ref 8.4–10.5)
CHLORIDE SERPL-SCNC: 106 MMOL/L — SIGNIFICANT CHANGE UP (ref 96–108)
CHLORIDE SERPL-SCNC: 106 MMOL/L — SIGNIFICANT CHANGE UP (ref 96–108)
CO2 SERPL-SCNC: 22 MMOL/L — SIGNIFICANT CHANGE UP (ref 22–29)
CO2 SERPL-SCNC: 22 MMOL/L — SIGNIFICANT CHANGE UP (ref 22–29)
CREAT SERPL-MCNC: 1.55 MG/DL — HIGH (ref 0.5–1.3)
CREAT SERPL-MCNC: 1.55 MG/DL — HIGH (ref 0.5–1.3)
CULTURE RESULTS: SIGNIFICANT CHANGE UP
CULTURE RESULTS: SIGNIFICANT CHANGE UP
EGFR: 47 ML/MIN/1.73M2 — LOW
EGFR: 47 ML/MIN/1.73M2 — LOW
GLUCOSE BLDC GLUCOMTR-MCNC: 161 MG/DL — HIGH (ref 70–99)
GLUCOSE BLDC GLUCOMTR-MCNC: 161 MG/DL — HIGH (ref 70–99)
GLUCOSE SERPL-MCNC: 121 MG/DL — HIGH (ref 70–99)
GLUCOSE SERPL-MCNC: 121 MG/DL — HIGH (ref 70–99)
POTASSIUM SERPL-MCNC: 4 MMOL/L — SIGNIFICANT CHANGE UP (ref 3.5–5.3)
POTASSIUM SERPL-MCNC: 4 MMOL/L — SIGNIFICANT CHANGE UP (ref 3.5–5.3)
POTASSIUM SERPL-SCNC: 4 MMOL/L — SIGNIFICANT CHANGE UP (ref 3.5–5.3)
POTASSIUM SERPL-SCNC: 4 MMOL/L — SIGNIFICANT CHANGE UP (ref 3.5–5.3)
SODIUM SERPL-SCNC: 139 MMOL/L — SIGNIFICANT CHANGE UP (ref 135–145)
SODIUM SERPL-SCNC: 139 MMOL/L — SIGNIFICANT CHANGE UP (ref 135–145)
SPECIMEN SOURCE: SIGNIFICANT CHANGE UP
SPECIMEN SOURCE: SIGNIFICANT CHANGE UP

## 2023-12-04 PROCEDURE — 70553 MRI BRAIN STEM W/O & W/DYE: CPT | Mod: MG

## 2023-12-04 PROCEDURE — 81001 URINALYSIS AUTO W/SCOPE: CPT

## 2023-12-04 PROCEDURE — 99238 HOSP IP/OBS DSCHRG MGMT 30/<: CPT

## 2023-12-04 PROCEDURE — 99284 EMERGENCY DEPT VISIT MOD MDM: CPT | Mod: 25

## 2023-12-04 PROCEDURE — 87086 URINE CULTURE/COLONY COUNT: CPT

## 2023-12-04 PROCEDURE — 0225U NFCT DS DNA&RNA 21 SARSCOV2: CPT

## 2023-12-04 PROCEDURE — G1004: CPT

## 2023-12-04 PROCEDURE — G0378: CPT

## 2023-12-04 PROCEDURE — 70553 MRI BRAIN STEM W/O & W/DYE: CPT | Mod: 26,MG

## 2023-12-04 PROCEDURE — 83605 ASSAY OF LACTIC ACID: CPT

## 2023-12-04 PROCEDURE — 70450 CT HEAD/BRAIN W/O DYE: CPT | Mod: MA

## 2023-12-04 PROCEDURE — 80048 BASIC METABOLIC PNL TOTAL CA: CPT

## 2023-12-04 PROCEDURE — 82962 GLUCOSE BLOOD TEST: CPT

## 2023-12-04 PROCEDURE — 80053 COMPREHEN METABOLIC PANEL: CPT

## 2023-12-04 PROCEDURE — 96374 THER/PROPH/DIAG INJ IV PUSH: CPT | Mod: XU

## 2023-12-04 PROCEDURE — 36415 COLL VENOUS BLD VENIPUNCTURE: CPT

## 2023-12-04 PROCEDURE — 85027 COMPLETE CBC AUTOMATED: CPT

## 2023-12-04 RX ORDER — RIVAROXABAN 15 MG-20MG
20 KIT ORAL ONCE
Refills: 0 | Status: COMPLETED | OUTPATIENT
Start: 2023-12-04 | End: 2023-12-04

## 2023-12-04 RX ADMIN — METFORMIN HYDROCHLORIDE 1000 MILLIGRAM(S): 850 TABLET ORAL at 18:35

## 2023-12-04 RX ADMIN — METFORMIN HYDROCHLORIDE 1000 MILLIGRAM(S): 850 TABLET ORAL at 06:00

## 2023-12-04 RX ADMIN — Medication 5 MILLIGRAM(S): at 22:32

## 2023-12-04 RX ADMIN — Medication 150 MICROGRAM(S): at 06:01

## 2023-12-04 RX ADMIN — Medication 5 MILLIGRAM(S): at 18:35

## 2023-12-04 RX ADMIN — Medication 5 MILLIGRAM(S): at 06:00

## 2023-12-04 RX ADMIN — Medication 1 GRAM(S): at 18:34

## 2023-12-04 RX ADMIN — Medication 150 MILLIGRAM(S): at 12:21

## 2023-12-04 RX ADMIN — Medication 10 MILLIGRAM(S): at 22:32

## 2023-12-04 RX ADMIN — TAMSULOSIN HYDROCHLORIDE 0.8 MILLIGRAM(S): 0.4 CAPSULE ORAL at 22:40

## 2023-12-04 RX ADMIN — Medication 1 GRAM(S): at 06:01

## 2023-12-04 RX ADMIN — DESMOPRESSIN ACETATE 0.25 MILLIGRAM(S): 0.1 TABLET ORAL at 18:36

## 2023-12-04 RX ADMIN — Medication 81 MILLIGRAM(S): at 12:21

## 2023-12-04 RX ADMIN — RIVAROXABAN 20 MILLIGRAM(S): KIT at 22:33

## 2023-12-04 RX ADMIN — Medication 15 MILLIGRAM(S): at 05:59

## 2023-12-04 RX ADMIN — DIVALPROEX SODIUM 500 MILLIGRAM(S): 500 TABLET, DELAYED RELEASE ORAL at 22:33

## 2023-12-04 RX ADMIN — PANTOPRAZOLE SODIUM 40 MILLIGRAM(S): 20 TABLET, DELAYED RELEASE ORAL at 06:02

## 2023-12-04 RX ADMIN — DESMOPRESSIN ACETATE 0.25 MILLIGRAM(S): 0.1 TABLET ORAL at 05:59

## 2023-12-04 RX ADMIN — Medication 325 MILLIGRAM(S): at 12:21

## 2023-12-04 RX ADMIN — ATORVASTATIN CALCIUM 40 MILLIGRAM(S): 80 TABLET, FILM COATED ORAL at 22:33

## 2023-12-04 RX ADMIN — Medication 25 MILLIGRAM(S): at 18:35

## 2023-12-04 RX ADMIN — Medication 2000 UNIT(S): at 12:21

## 2023-12-04 RX ADMIN — Medication 100 MILLIGRAM(S): at 06:00

## 2023-12-04 RX ADMIN — Medication 100 MILLIGRAM(S): at 18:36

## 2023-12-04 NOTE — ED CDU PROVIDER DISPOSITION NOTE - ATTENDING CONTRIBUTION TO CARE
I agree with the PA's note and was available for any issues/concerns. I was directly involved in patient care. My brief overall assessment is as follows: pmh as documented, in obs for abnl cth with hx of brain surgery, got mri, without acute findings, cleared by neurosurg, pt feeling well. return precautions and f/u.

## 2023-12-04 NOTE — PROGRESS NOTE ADULT - ASSESSMENT
73-year-old male past medical history of brain tumor, DI, DVT/PE on Xarelto. Denies trauma or any sick contacts at this time, but patient does complain of headache, rated 3/10, which he has had in the past x 3 days. Pt had sellar pituitary tumor resected by Dr. Faustin from [facility] in 2014/2015 by transphenoidal and R pteronial craniotomy approach. Neurosurgery consulted for mildly thicker mixed density collection in the extra-axial R pteronial craniotomy site found on CTH to rule out SDH vs dural thickening.    Plan    - Q4 neurochecks  - Hold AC/AP until stability scan  - Pain control PRN  -MRI of the brain with and without  - Pt seen with Dr Tim and recomm    73-year-old male past medical history of brain tumor, DI, DVT/PE on Xarelto. Denies trauma or any sick contacts at this time, but patient does complain of headache, rated 3/10, which he has had in the past x 3 days. Pt had sellar pituitary tumor resected by Dr. Faustin from [facility] in 2014/2015 by transphenoidal and R pteronial craniotomy approach. Neurosurgery consulted for mildly thicker mixed density collection in the extra-axial R pteronial craniotomy site found on CTH to rule out SDH vs dural thickening.    Plan    - Q4 neurochecks  - Hold AC/AP until stability scan  - Pain control PRN  -MRI of the brain with and without  - Pt seen with Dr Tim and recomm

## 2023-12-04 NOTE — ED CDU PROVIDER DISPOSITION NOTE - CLINICAL COURSE
73-year-old male past medical history of brain tumor, DI, DVT/PE on Xarelto, HLD, hypothyroid, OPA presented to ED for headache. CT without emergent findings however Neurosurg consulted and recommended MR brain. MR brain without acute findings now neurosurg clearing pt for DC. Pt remains well appearing without complaints. Has his own Neurosurg follow up and will provide ours as well

## 2023-12-04 NOTE — ED CDU PROVIDER SUBSEQUENT DAY NOTE - HISTORY
PT placed in OBS, has had no acute incidents or complaints while in CDU PT is stable. PT Placed in OBS for further imaging to evaluate CT head abnormality. Pt with hx of pituitary mass that was resected in past.

## 2023-12-04 NOTE — PROGRESS NOTE ADULT - SUBJECTIVE AND OBJECTIVE BOX
INTERVAL HPI/OVERNIGHT EVENTS:  HPI: 73-year-old male past medical history of brain tumor, DI, DVT/PE on Xarelto, HLD, hypothyroid, OPA  comes to the ED with subjective fever up to 98 upper respiratory symptoms including nasal congestion and cough accompanied with his wife.  Denies trauma or any sick contacts at this time, but patient does complain of headache, rated 3/10, which he has had in the past x 3 days. Pt had sellar pituitary tumor resected by Dr. Faustin from [facility] in 2014/2015 by transphenoidal and R pteronial craniotomy approach. Neurosurgery consulted for mildly thicker mixed density collection in the extra-axial R pteronial craniotomy site found on CTH to rule out SDH vs dural thickening.    Pt seen this am he is denying having any headache at this time.      MEDICATIONS  (STANDING):  aspirin enteric coated 81 milliGRAM(s) Oral daily  atorvastatin 40 milliGRAM(s) Oral at bedtime  cholecalciferol 2000 Unit(s) Oral daily  desmopressin 0.25 milliGRAM(s) Oral two times a day  divalproex  milliGRAM(s) Oral at bedtime  ferrous    sulfate 325 milliGRAM(s) Oral daily  hydrocortisone 15 milliGRAM(s) Oral daily  hydrocortisone 10 milliGRAM(s) Oral at bedtime  levothyroxine 150 MICROGram(s) Oral daily  metFORMIN 1000 milliGRAM(s) Oral two times a day  metoclopramide 5 milliGRAM(s) Oral three times a day  metoprolol tartrate 25 milliGRAM(s) Oral two times a day  pantoprazole    Tablet 40 milliGRAM(s) Oral before breakfast  sucralfate 1 Gram(s) Oral two times a day  tamsulosin 0.8 milliGRAM(s) Oral at bedtime  topiramate 100 milliGRAM(s) Oral two times a day  venlafaxine XR. 150 milliGRAM(s) Oral daily    MEDICATIONS  (PRN):  acetaminophen     Tablet .. 650 milliGRAM(s) Oral every 6 hours PRN Mild Pain (1 - 3)  ALPRAZolam 0.5 milliGRAM(s) Oral every 6 hours PRN antiety      Allergies    No Known Allergies  Intolerances  Vital Signs Last 24 Hrs  T(C): 36.6 (04 Dec 2023 11:19), Max: 36.8 (03 Dec 2023 15:19)  T(F): 97.9 (04 Dec 2023 11:19), Max: 98.2 (03 Dec 2023 15:19)  HR: 93 (04 Dec 2023 11:19) (87 - 99)  BP: 111/68 (04 Dec 2023 11:19) (93/56 - 131/84)  BP(mean): --  RR: 18 (04 Dec 2023 11:19) (17 - 18)  SpO2: 95% (04 Dec 2023 11:19) (94% - 100%)    Parameters below as of 04 Dec 2023 11:19  Patient On (Oxygen Delivery Method): room air     BMI (kg/m2): 37 (12-03-23 @ 08:32)      PHYSICAL EXAM  GENERAL: NAD, well-groomed, well-developed  HEAD:  Atraumatic, Normocephalic  EYES: EOMI, PERRLA, conjunctiva and sclera clear  ENMT: No tonsillar erythema, exudates, or enlargement; Moist mucous membranes, Good dentition, No lesions  NECK: Supple, No JVD, Normal thyroid  NERVOUS SYSTEM:  Alert & Oriented X3, Good concentration; Motor Strength 5/5 B/L upper and lower extremities; DTRs 2+ intact and symmetric  CHEST/LUNG: Clear to percussion bilaterally; No rales, rhonchi, wheezing, or rubs  HEART: Regular rate and rhythm; No murmurs, rubs, or gallops  ABDOMEN: Soft, Nontender, Nondistended; Bowel sounds present  EXTREMITIES:  2+ Peripheral Pulses, No clubbing, cyanosis, or edema  LYMPH: No lymphadenopathy noted  SKIN: No rashes or lesions      LABS:                          17.0   6.52  )-----------( 165      ( 03 Dec 2023 09:50 )             52.3     12-04    139  |  106  |  18.3  ----------------------------<  121<H>  4.0   |  22.0  |  1.55<H>    Ca    8.3<L>      04 Dec 2023 01:00    TPro  7.3  /  Alb  4.0  /  TBili  0.4  /  DBili  x   /  AST  46<H>  /  ALT  77<H>  /  AlkPhos  82  12-03      Urinalysis Basic - ( 04 Dec 2023 01:00 )    Color: x / Appearance: x / SG: x / pH: x  Gluc: 121 mg/dL / Ketone: x  / Bili: x / Urobili: x   Blood: x / Protein: x / Nitrite: x   Leuk Esterase: x / RBC: x / WBC x   Sq Epi: x / Non Sq Epi: x / Bacteria: x      I&O's Detail    RADIOLOGY & ADDITIONAL TESTS:  < from: CT Head No Cont (12.03.23 @ 19:28) >    ACC: [id number] EXAM:  CT BRAIN   ORDERED BY: JAZMYN KIRAN     PROCEDURE DATE:  12/03/2023        < end of copied text >  < from: CT Head No Cont (12.03.23 @ 19:28) >  IMPRESSION:  Right pterional craniotomy. Subjacent extra-axial hyperdensity is   unchanged.    --- End of Report ---          < end of copied text >   INTERVAL HPI/OVERNIGHT EVENTS:  HPI: 73-year-old male past medical history of brain tumor, DI, DVT/PE on Xarelto, HLD, hypothyroid, OPA  comes to the ED with subjective fever up to 98 upper respiratory symptoms including nasal congestion and cough accompanied with his wife.  Denies trauma or any sick contacts at this time, but patient does complain of headache, rated 3/10, which he has had in the past x 3 days. Pt had sellar pituitary tumor resected by Dr. Faustin from [facility] in 2014/2015 by transphenoidal and R pteronial craniotomy approach. Neurosurgery consulted for mildly thicker mixed density collection in the extra-axial R pteronial craniotomy site found on CTH to rule out SDH vs dural thickening.    Pt seen this am he is denying having any headache at this time.      MEDICATIONS  (STANDING):  aspirin enteric coated 81 milliGRAM(s) Oral daily  atorvastatin 40 milliGRAM(s) Oral at bedtime  cholecalciferol 2000 Unit(s) Oral daily  desmopressin 0.25 milliGRAM(s) Oral two times a day  divalproex  milliGRAM(s) Oral at bedtime  ferrous    sulfate 325 milliGRAM(s) Oral daily  hydrocortisone 15 milliGRAM(s) Oral daily  hydrocortisone 10 milliGRAM(s) Oral at bedtime  levothyroxine 150 MICROGram(s) Oral daily  metFORMIN 1000 milliGRAM(s) Oral two times a day  metoclopramide 5 milliGRAM(s) Oral three times a day  metoprolol tartrate 25 milliGRAM(s) Oral two times a day  pantoprazole    Tablet 40 milliGRAM(s) Oral before breakfast  sucralfate 1 Gram(s) Oral two times a day  tamsulosin 0.8 milliGRAM(s) Oral at bedtime  topiramate 100 milliGRAM(s) Oral two times a day  venlafaxine XR. 150 milliGRAM(s) Oral daily    MEDICATIONS  (PRN):  acetaminophen     Tablet .. 650 milliGRAM(s) Oral every 6 hours PRN Mild Pain (1 - 3)  ALPRAZolam 0.5 milliGRAM(s) Oral every 6 hours PRN antiety      Allergies    No Known Allergies  Intolerances  Vital Signs Last 24 Hrs  T(C): 36.6 (04 Dec 2023 11:19), Max: 36.8 (03 Dec 2023 15:19)  T(F): 97.9 (04 Dec 2023 11:19), Max: 98.2 (03 Dec 2023 15:19)  HR: 93 (04 Dec 2023 11:19) (87 - 99)  BP: 111/68 (04 Dec 2023 11:19) (93/56 - 131/84)  BP(mean): --  RR: 18 (04 Dec 2023 11:19) (17 - 18)  SpO2: 95% (04 Dec 2023 11:19) (94% - 100%)    Parameters below as of 04 Dec 2023 11:19  Patient On (Oxygen Delivery Method): room air     BMI (kg/m2): 37 (12-03-23 @ 08:32)      PHYSICAL EXAM  GENERAL: NAD, well-groomed, well-developed  HEAD:  Atraumatic, Normocephalic  EYES: EOMI, PERRLA, conjunctiva and sclera clear  ENMT: No tonsillar erythema, exudates, or enlargement; Moist mucous membranes, Good dentition, No lesions  NECK: Supple, No JVD, Normal thyroid  NERVOUS SYSTEM:  Alert & Oriented X3, Good concentration; Motor Strength 5/5 B/L upper and lower extremities; DTRs 2+ intact and symmetric  CHEST/LUNG: Clear to percussion bilaterally; No rales, rhonchi, wheezing, or rubs  HEART: Regular rate and rhythm; No murmurs, rubs, or gallops  ABDOMEN: Soft, Nontender, Nondistended; Bowel sounds present  EXTREMITIES:  2+ Peripheral Pulses, No clubbing, cyanosis, or edema  LYMPH: No lymphadenopathy noted  SKIN: No rashes or lesions      LABS:                          17.0   6.52  )-----------( 165      ( 03 Dec 2023 09:50 )             52.3     12-04    139  |  106  |  18.3  ----------------------------<  121<H>  4.0   |  22.0  |  1.55<H>    Ca    8.3<L>      04 Dec 2023 01:00    TPro  7.3  /  Alb  4.0  /  TBili  0.4  /  DBili  x   /  AST  46<H>  /  ALT  77<H>  /  AlkPhos  82  12-03      Urinalysis Basic - ( 04 Dec 2023 01:00 )    Color: x / Appearance: x / SG: x / pH: x  Gluc: 121 mg/dL / Ketone: x  / Bili: x / Urobili: x   Blood: x / Protein: x / Nitrite: x   Leuk Esterase: x / RBC: x / WBC x   Sq Epi: x / Non Sq Epi: x / Bacteria: x      I&O's Detail    RADIOLOGY & ADDITIONAL TESTS:  < from: CT Head No Cont (12.03.23 @ 19:28) >    ACC: [id number] EXAM:  CT BRAIN   ORDERED BY: JAZMYN KIRAN     PROCEDURE DATE:  12/03/2023        < end of copied text >  < from: CT Head No Cont (12.03.23 @ 19:28) >  IMPRESSION:  Right pterional craniotomy. Subjacent extra-axial hyperdensity is   unchanged.    --- End of Report ---          < end of copied text >

## 2023-12-04 NOTE — ED CDU PROVIDER SUBSEQUENT DAY NOTE - PROGRESS NOTE DETAILS
spoke with neurosurg PA, pt is clear for discharge due to no acute changes on MR brain - sanjay zayas

## 2023-12-04 NOTE — ED ADULT NURSE REASSESSMENT NOTE - NS ED NURSE REASSESS COMMENT FT1
Assumed care of patient at 22:30 from ROD Edmondson. Charting as noted. Patient AA&Ox4, resp even/unlabored, on room air, MAEx4, skin warm, dry, intact, presents to ED c/o abd pain. At time of assessment, patient denies pain/discomfort, denies CP/SOB, denies any further complaints or physical symptoms. Patient updated on the plan of care, awaiting MRI. Stretcher locked in lowest position. No signs of acute distress noted, safety maintained, son at bed side.
Pt alert and oriented, no apparent distress noted at this time. Pt handed off to RN in stable condition.
Pt remains asleep. No nonverbal indicators of pain present. Respirations even and unlabored, visible chest rise and fall. No acute distress noted. Hourly safety rounds maintained.
Pt sleeping comfortably on stretcher. No nonverbal indicators of pain present. Respirations even and unlabored, visible chest rise and fall. Peripheral IV intact; flushing without difficulty.
Received pt from Gary VELASCO pt sitting quietly in stretcher NAD at this time awaiting CT results. no new orders at this time pt safety ongoing. NSR noted on CM at this time.
Received report from previous shift RN. Pt awaiting MRI results. Pt A & Ox4, ambulatory in department with even and steady gait. Pt eager to be discharged, requesting to see provider. Provider made aware. No further needs at this time.
Assumed care of patient in ED alert and oriented x4, resting in bed. No s/s of distress. PT denies any symptoms at this time. Steady gait noted. Neurologically intact at this time. Awaiting MRI.

## 2023-12-04 NOTE — ED CDU PROVIDER DISPOSITION NOTE - PATIENT PORTAL LINK FT
You can access the FollowMyHealth Patient Portal offered by [facility] by registering at the following website: [email or web address]. By joining [employer]’s FollowMyHealth portal, you will also be able to view your health information using other applications (apps) compatible with our system. You can access the FollowMyHealth Patient Portal offered by [facility] by registering at the following website: [email or web address]. By joining [facility]’s FollowMyHealth portal, you will also be able to view your health information using other applications (apps) compatible with our system.

## 2023-12-04 NOTE — CHART NOTE - NSCHARTNOTEFT_GEN_A_CORE
-MRI Results discussed with Dr. Haskins attending on call. No role for additional neurosurgery work up. Patient is cleared from the neurosurgery standpoint to be discharged. Patient should follow up with outpatient Neurosurgery in 5-7 days. Given that MRI is negative for hemorrhage, no neurosurgical contraindication to resumption of home xarelto. Neurosurgery signing off. Please reconsult as needed

## 2023-12-04 NOTE — ED CDU PROVIDER DISPOSITION NOTE - NSFOLLOWUPINSTRUCTIONS_ED_ALL_ED_FT
Follow up with neurosurgery within 1 week   Follow up with PCP within 1 week   Please take your daily medications as instructed, including your xarelto  - Seek immediate medical care for any new, worsening or concerning signs or symptoms.   - You were given copies of all your test results, please bring to your primary care doctor for review of any abnormal test results  - Follow up with  listed above, in 1 week    Feel better!    Headache    A headache is pain or discomfort felt around the head or neck area. The specific cause of a headache may not be found as there are many types including tension headaches, migraine headaches, and cluster headaches. Watch your condition for any changes. Things you can do to manage your pain include taking over the counter and prescription medications as instructed by your health care provider, lying down in a dark quiet room, limiting stress, getting regular sleep, and refraining from alcohol and tobacco products.    SEEK IMMEDIATE MEDICAL CARE IF YOU HAVE ANY OF THE FOLLOWING SYMPTOMS: fever, vomiting, stiff neck, loss of vision, problems with speech, muscle weakness, loss of balance, trouble walking, passing out, or confusion.

## 2023-12-21 NOTE — ED ADULT NURSE NOTE - IN THE PAST 12 MONTHS HAVE YOU USED DRUGS OTHER THAN THOSE REQUIRED FOR MEDICAL REASON?
Refill request received for   sildenafil (VIAGRA) 50 MG tablet     Last office visit: 6/26/2023  Next office visit: 12/28/2023  Last refill: 8/29/23        Erectile Dysfunction Refill Protocol - 12 Month Protocol [id number]/2023 01:01 PM   Protocol Details Seen by prescribing provider or same department within the last 12 months or has a future appt in 3 months - IF FAILED PLEASE LOOK AT CHART REVIEW FOR LAST VISIT AND PROCEED ACCORDINGLY    Not currently on a Nitrate    Patient does not have Pulmonary Hypertension on problem list    Medication (including dose and sig) on current meds list        Rx refilled per protocol.   
No

## 2023-12-30 NOTE — ED ADULT NURSE NOTE - CAS TRG GENERAL AIRWAY, MLM
Behavioral Health IP Nursing Progress Note    Suicidal Ideation:  Pt denies SI    Current C-SSRS score: Negative Screen - White (12/30/23 0802)      Protective Factors / Reason for Living: Responsibility to children, Social supports    Interventions:   Maintain current plan of care    Other Interventions Implemented:  1:1 safety assessment with RN      Subjective: Patient denies SI, HI, AVH. Reports eating well. Reports high levels of anxiety and was given scheduled Ativan.  States he wants to take Zyprexa for anxiety, as that is \"the only thing that's worked for me\".  Page out to Dr. Wang.    Objective:   Mental Health: Patient presents with a depressed, anxious affect.  Isolative and withdrawn.  Compliant with medication regimen.       Medical:   None this shift     Assessment / Actions:   PRN Medications given?   No    Plan:   Treatment Plan reviewed.         Patent

## 2024-01-16 ENCOUNTER — APPOINTMENT (OUTPATIENT)
Dept: NEUROLOGY | Facility: CLINIC | Age: 74
End: 2024-01-16
Payer: MEDICARE

## 2024-01-16 VITALS
HEIGHT: 71 IN | DIASTOLIC BLOOD PRESSURE: 64 MMHG | WEIGHT: 264 LBS | HEART RATE: 78 BPM | TEMPERATURE: 97.8 F | BODY MASS INDEX: 36.96 KG/M2 | SYSTOLIC BLOOD PRESSURE: 106 MMHG

## 2024-01-16 PROCEDURE — 99214 OFFICE O/P EST MOD 30 MIN: CPT

## 2024-01-16 NOTE — REVIEW OF SYSTEMS
[As Noted in HPI] : as noted in HPI [Memory Lapses or Loss] : memory loss [Decr. Concentrating Ability] : decreased concentrating ability [Tension Headache] : tension-type headaches [Negative] : Heme/Lymph [Poor Coordination] : good coordination [Seizures] : no convulsions [Migraine Headache] : no migraine headache

## 2024-01-16 NOTE — DATA REVIEWED
[de-identified] : 12/4/23: MRI brain - no SDH, hydrocephalus, prior frontotemporal craniotomy without significant change.  [de-identified] : 2/24/21: EEG shows focal slowing in the right temporal and parietal region superimposed on generalized slowing, Potentially structural in nature, no epileptiform discharges or seizures.. [de-identified] : 12/3/23: CT scan head: right pterional craniotomy with adjacent hypodensity unchanged. 2/10/20: CT head: Status post right craniotomy with underlying due to of thickening without significant change from prior study, no evidence of acute intracranial hemorrhage

## 2024-01-16 NOTE — HISTORY OF PRESENT ILLNESS
[FreeTextEntry1] : Pt is here for a follow-up visit today, last seen on 7/11/2023, patient is accompanied by his wife. Pt has no new complaints, his wife reports that he has been having headaches every once in a while, at least once a week, as per the patient's these are mild, he takes a Tylenol and the headaches resolved, he does admit that he has been forgetting to take Ajovy injection once in a while, wife reports that they have marked it in a calendar so he does not forget.  Patient continues to take topiramate 100 Mg twice daily and Depakote 500 Mg daily, in addition to Migrelief  He has not had any seizures or severe headaches.  Patient's wife brings to my attention that on 12/3/2023 he was not feeling well, went to [facility] ER, was noted to have hypernatremia, was given IV fluids, his symptoms resolved, he had CT scan of the head that revealed right pterional craniotomy with adjacent hypodensity unchanged.  He was observed there for 24 hours, MRI brain done on 12/4/2023 revealed no SDH, hydrocephalus, prior frontotemporal craniotomy without significant change.
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
## 2024-02-04 ENCOUNTER — EMERGENCY (EMERGENCY)
Facility: HOSPITAL | Age: 74
LOS: 1 days | Discharge: HOME | End: 2024-02-04
Attending: STUDENT IN AN ORGANIZED HEALTH CARE EDUCATION/TRAINING PROGRAM
Payer: MEDICARE

## 2024-02-04 VITALS
HEART RATE: 98 BPM | WEIGHT: 253.09 LBS | TEMPERATURE: 100 F | OXYGEN SATURATION: 94 % | DIASTOLIC BLOOD PRESSURE: 67 MMHG | SYSTOLIC BLOOD PRESSURE: 113 MMHG | RESPIRATION RATE: 17 BRPM

## 2024-02-04 VITALS
TEMPERATURE: 98 F | RESPIRATION RATE: 19 BRPM | HEART RATE: 81 BPM | OXYGEN SATURATION: 94 % | DIASTOLIC BLOOD PRESSURE: 60 MMHG | SYSTOLIC BLOOD PRESSURE: 109 MMHG

## 2024-02-04 DIAGNOSIS — Z95.828 PRESENCE OF OTHER VASCULAR IMPLANTS AND GRAFTS: Chronic | ICD-10-CM

## 2024-02-04 DIAGNOSIS — Z98.890 OTHER SPECIFIED POSTPROCEDURAL STATES: Chronic | ICD-10-CM

## 2024-02-04 LAB
ALBUMIN SERPL ELPH-MCNC: 3.9 G/DL — SIGNIFICANT CHANGE UP (ref 3.3–5.2)
ALP SERPL-CCNC: 78 U/L — SIGNIFICANT CHANGE UP (ref 40–120)
ALT FLD-CCNC: 44 U/L — HIGH
ANION GAP SERPL CALC-SCNC: 13 MMOL/L — SIGNIFICANT CHANGE UP (ref 5–17)
ANION GAP SERPL CALC-SCNC: 14 MMOL/L — SIGNIFICANT CHANGE UP (ref 5–17)
APPEARANCE UR: CLEAR — SIGNIFICANT CHANGE UP
AST SERPL-CCNC: 25 U/L — SIGNIFICANT CHANGE UP
BASE EXCESS BLDV CALC-SCNC: -1.6 MMOL/L — SIGNIFICANT CHANGE UP (ref -2–3)
BASE EXCESS BLDV CALC-SCNC: -3.5 MMOL/L — LOW (ref -2–3)
BASOPHILS # BLD AUTO: 0.05 K/UL — SIGNIFICANT CHANGE UP (ref 0–0.2)
BASOPHILS NFR BLD AUTO: 1 % — SIGNIFICANT CHANGE UP (ref 0–2)
BILIRUB SERPL-MCNC: 0.5 MG/DL — SIGNIFICANT CHANGE UP (ref 0.4–2)
BILIRUB UR-MCNC: NEGATIVE — SIGNIFICANT CHANGE UP
BUN SERPL-MCNC: 15 MG/DL — SIGNIFICANT CHANGE UP (ref 8–20)
BUN SERPL-MCNC: 15.9 MG/DL — SIGNIFICANT CHANGE UP (ref 8–20)
CA-I SERPL-SCNC: 1.18 MMOL/L — SIGNIFICANT CHANGE UP (ref 1.15–1.33)
CA-I SERPL-SCNC: 1.29 MMOL/L — SIGNIFICANT CHANGE UP (ref 1.15–1.33)
CALCIUM SERPL-MCNC: 8.6 MG/DL — SIGNIFICANT CHANGE UP (ref 8.4–10.5)
CALCIUM SERPL-MCNC: 9.4 MG/DL — SIGNIFICANT CHANGE UP (ref 8.4–10.5)
CHLORIDE BLDV-SCNC: 104 MMOL/L — SIGNIFICANT CHANGE UP (ref 96–108)
CHLORIDE BLDV-SCNC: 107 MMOL/L — SIGNIFICANT CHANGE UP (ref 96–108)
CHLORIDE SERPL-SCNC: 104 MMOL/L — SIGNIFICANT CHANGE UP (ref 96–108)
CHLORIDE SERPL-SCNC: 106 MMOL/L — SIGNIFICANT CHANGE UP (ref 96–108)
CO2 SERPL-SCNC: 22 MMOL/L — SIGNIFICANT CHANGE UP (ref 22–29)
CO2 SERPL-SCNC: 24 MMOL/L — SIGNIFICANT CHANGE UP (ref 22–29)
COLOR SPEC: YELLOW — SIGNIFICANT CHANGE UP
CREAT SERPL-MCNC: 1.49 MG/DL — HIGH (ref 0.5–1.3)
CREAT SERPL-MCNC: 1.75 MG/DL — HIGH (ref 0.5–1.3)
DIFF PNL FLD: NEGATIVE — SIGNIFICANT CHANGE UP
EGFR: 41 ML/MIN/1.73M2 — LOW
EGFR: 50 ML/MIN/1.73M2 — LOW
EOSINOPHIL # BLD AUTO: 0.11 K/UL — SIGNIFICANT CHANGE UP (ref 0–0.5)
EOSINOPHIL NFR BLD AUTO: 2.1 % — SIGNIFICANT CHANGE UP (ref 0–6)
GAS PNL BLDV: 134 MMOL/L — LOW (ref 136–145)
GAS PNL BLDV: 141 MMOL/L — SIGNIFICANT CHANGE UP (ref 136–145)
GAS PNL BLDV: SIGNIFICANT CHANGE UP
GAS PNL BLDV: SIGNIFICANT CHANGE UP
GLUCOSE BLDV-MCNC: 124 MG/DL — HIGH (ref 70–99)
GLUCOSE BLDV-MCNC: 96 MG/DL — SIGNIFICANT CHANGE UP (ref 70–99)
GLUCOSE SERPL-MCNC: 134 MG/DL — HIGH (ref 70–99)
GLUCOSE SERPL-MCNC: 98 MG/DL — SIGNIFICANT CHANGE UP (ref 70–99)
GLUCOSE UR QL: NEGATIVE MG/DL — SIGNIFICANT CHANGE UP
HCO3 BLDV-SCNC: 24 MMOL/L — SIGNIFICANT CHANGE UP (ref 22–29)
HCO3 BLDV-SCNC: 25 MMOL/L — SIGNIFICANT CHANGE UP (ref 22–29)
HCT VFR BLD CALC: 47 % — SIGNIFICANT CHANGE UP (ref 39–50)
HCT VFR BLDA CALC: 43 % — SIGNIFICANT CHANGE UP
HCT VFR BLDA CALC: 47 % — SIGNIFICANT CHANGE UP
HGB BLD CALC-MCNC: 14.2 G/DL — SIGNIFICANT CHANGE UP (ref 12.6–17.4)
HGB BLD CALC-MCNC: 15.5 G/DL — SIGNIFICANT CHANGE UP (ref 12.6–17.4)
HGB BLD-MCNC: 15.5 G/DL — SIGNIFICANT CHANGE UP (ref 13–17)
IMM GRANULOCYTES NFR BLD AUTO: 1.6 % — HIGH (ref 0–0.9)
KETONES UR-MCNC: NEGATIVE MG/DL — SIGNIFICANT CHANGE UP
LACTATE BLDV-MCNC: 1.2 MMOL/L — SIGNIFICANT CHANGE UP (ref 0.5–2)
LACTATE BLDV-MCNC: 3.5 MMOL/L — HIGH (ref 0.5–2)
LEUKOCYTE ESTERASE UR-ACNC: NEGATIVE — SIGNIFICANT CHANGE UP
LIDOCAIN IGE QN: 27 U/L — SIGNIFICANT CHANGE UP (ref 22–51)
LYMPHOCYTES # BLD AUTO: 1.23 K/UL — SIGNIFICANT CHANGE UP (ref 1–3.3)
LYMPHOCYTES # BLD AUTO: 23.8 % — SIGNIFICANT CHANGE UP (ref 13–44)
MAGNESIUM SERPL-MCNC: 2 MG/DL — SIGNIFICANT CHANGE UP (ref 1.6–2.6)
MCHC RBC-ENTMCNC: 32.1 PG — SIGNIFICANT CHANGE UP (ref 27–34)
MCHC RBC-ENTMCNC: 33 GM/DL — SIGNIFICANT CHANGE UP (ref 32–36)
MCV RBC AUTO: 97.3 FL — SIGNIFICANT CHANGE UP (ref 80–100)
MONOCYTES # BLD AUTO: 0.74 K/UL — SIGNIFICANT CHANGE UP (ref 0–0.9)
MONOCYTES NFR BLD AUTO: 14.3 % — HIGH (ref 2–14)
NEUTROPHILS # BLD AUTO: 2.95 K/UL — SIGNIFICANT CHANGE UP (ref 1.8–7.4)
NEUTROPHILS NFR BLD AUTO: 57.2 % — SIGNIFICANT CHANGE UP (ref 43–77)
NITRITE UR-MCNC: NEGATIVE — SIGNIFICANT CHANGE UP
PCO2 BLDV: 49 MMHG — SIGNIFICANT CHANGE UP (ref 42–55)
PCO2 BLDV: 61 MMHG — HIGH (ref 42–55)
PH BLDV: 7.21 — LOW (ref 7.32–7.43)
PH BLDV: 7.31 — LOW (ref 7.32–7.43)
PH UR: 6 — SIGNIFICANT CHANGE UP (ref 5–8)
PLATELET # BLD AUTO: 147 K/UL — LOW (ref 150–400)
PO2 BLDV: 45 MMHG — SIGNIFICANT CHANGE UP (ref 25–45)
PO2 BLDV: 48 MMHG — HIGH (ref 25–45)
POTASSIUM BLDV-SCNC: 3.6 MMOL/L — SIGNIFICANT CHANGE UP (ref 3.5–5.1)
POTASSIUM BLDV-SCNC: 4.1 MMOL/L — SIGNIFICANT CHANGE UP (ref 3.5–5.1)
POTASSIUM SERPL-MCNC: 4 MMOL/L — SIGNIFICANT CHANGE UP (ref 3.5–5.3)
POTASSIUM SERPL-MCNC: 4.7 MMOL/L — SIGNIFICANT CHANGE UP (ref 3.5–5.3)
POTASSIUM SERPL-SCNC: 4 MMOL/L — SIGNIFICANT CHANGE UP (ref 3.5–5.3)
POTASSIUM SERPL-SCNC: 4.7 MMOL/L — SIGNIFICANT CHANGE UP (ref 3.5–5.3)
PROT SERPL-MCNC: 6.7 G/DL — SIGNIFICANT CHANGE UP (ref 6.6–8.7)
PROT UR-MCNC: NEGATIVE MG/DL — SIGNIFICANT CHANGE UP
RAPID RVP RESULT: SIGNIFICANT CHANGE UP
RBC # BLD: 4.83 M/UL — SIGNIFICANT CHANGE UP (ref 4.2–5.8)
RBC # FLD: 13.4 % — SIGNIFICANT CHANGE UP (ref 10.3–14.5)
SAO2 % BLDV: 75.3 % — SIGNIFICANT CHANGE UP
SAO2 % BLDV: 78.8 % — SIGNIFICANT CHANGE UP
SARS-COV-2 RNA SPEC QL NAA+PROBE: SIGNIFICANT CHANGE UP
SODIUM SERPL-SCNC: 140 MMOL/L — SIGNIFICANT CHANGE UP (ref 135–145)
SODIUM SERPL-SCNC: 143 MMOL/L — SIGNIFICANT CHANGE UP (ref 135–145)
SP GR SPEC: 1.01 — SIGNIFICANT CHANGE UP (ref 1–1.03)
T4 AB SER-ACNC: 7.9 UG/DL — SIGNIFICANT CHANGE UP (ref 4.5–12)
TSH SERPL-MCNC: <0.1 UIU/ML — LOW (ref 0.27–4.2)
UROBILINOGEN FLD QL: 1 MG/DL — SIGNIFICANT CHANGE UP (ref 0.2–1)
WBC # BLD: 5.16 K/UL — SIGNIFICANT CHANGE UP (ref 3.8–10.5)
WBC # FLD AUTO: 5.16 K/UL — SIGNIFICANT CHANGE UP (ref 3.8–10.5)

## 2024-02-04 PROCEDURE — 85014 HEMATOCRIT: CPT

## 2024-02-04 PROCEDURE — 71045 X-RAY EXAM CHEST 1 VIEW: CPT | Mod: 26

## 2024-02-04 PROCEDURE — 70450 CT HEAD/BRAIN W/O DYE: CPT | Mod: MD

## 2024-02-04 PROCEDURE — 85018 HEMOGLOBIN: CPT

## 2024-02-04 PROCEDURE — 84132 ASSAY OF SERUM POTASSIUM: CPT

## 2024-02-04 PROCEDURE — 85025 COMPLETE CBC W/AUTO DIFF WBC: CPT

## 2024-02-04 PROCEDURE — 84295 ASSAY OF SERUM SODIUM: CPT

## 2024-02-04 PROCEDURE — 82947 ASSAY GLUCOSE BLOOD QUANT: CPT

## 2024-02-04 PROCEDURE — 82330 ASSAY OF CALCIUM: CPT

## 2024-02-04 PROCEDURE — 84443 ASSAY THYROID STIM HORMONE: CPT

## 2024-02-04 PROCEDURE — 83605 ASSAY OF LACTIC ACID: CPT

## 2024-02-04 PROCEDURE — 99284 EMERGENCY DEPT VISIT MOD MDM: CPT

## 2024-02-04 PROCEDURE — 83735 ASSAY OF MAGNESIUM: CPT

## 2024-02-04 PROCEDURE — 80048 BASIC METABOLIC PNL TOTAL CA: CPT

## 2024-02-04 PROCEDURE — 82435 ASSAY OF BLOOD CHLORIDE: CPT

## 2024-02-04 PROCEDURE — 82803 BLOOD GASES ANY COMBINATION: CPT

## 2024-02-04 PROCEDURE — 70450 CT HEAD/BRAIN W/O DYE: CPT | Mod: 26,MD

## 2024-02-04 PROCEDURE — 99284 EMERGENCY DEPT VISIT MOD MDM: CPT | Mod: 25

## 2024-02-04 PROCEDURE — 84436 ASSAY OF TOTAL THYROXINE: CPT

## 2024-02-04 PROCEDURE — 83690 ASSAY OF LIPASE: CPT

## 2024-02-04 PROCEDURE — 0225U NFCT DS DNA&RNA 21 SARSCOV2: CPT

## 2024-02-04 PROCEDURE — 36415 COLL VENOUS BLD VENIPUNCTURE: CPT

## 2024-02-04 PROCEDURE — 80053 COMPREHEN METABOLIC PANEL: CPT

## 2024-02-04 PROCEDURE — 81003 URINALYSIS AUTO W/O SCOPE: CPT

## 2024-02-04 PROCEDURE — 71045 X-RAY EXAM CHEST 1 VIEW: CPT

## 2024-02-04 RX ORDER — DESMOPRESSIN ACETATE 0.1 MG/1
0.2 TABLET ORAL ONCE
Refills: 0 | Status: COMPLETED | OUTPATIENT
Start: 2024-02-04 | End: 2024-02-04

## 2024-02-04 RX ORDER — SODIUM CHLORIDE 9 MG/ML
1000 INJECTION, SOLUTION INTRAVENOUS ONCE
Refills: 0 | Status: COMPLETED | OUTPATIENT
Start: 2024-02-04 | End: 2024-02-04

## 2024-02-04 RX ORDER — DESMOPRESSIN ACETATE 0.1 MG/1
0.25 TABLET ORAL ONCE
Refills: 0 | Status: DISCONTINUED | OUTPATIENT
Start: 2024-02-04 | End: 2024-02-04

## 2024-02-04 RX ADMIN — DESMOPRESSIN ACETATE 0.2 MILLIGRAM(S): 0.1 TABLET ORAL at 15:27

## 2024-02-04 RX ADMIN — SODIUM CHLORIDE 1000 MILLILITER(S): 9 INJECTION, SOLUTION INTRAVENOUS at 14:02

## 2024-02-04 NOTE — ED PROVIDER NOTE - CLINICAL SUMMARY MEDICAL DECISION MAKING FREE TEXT BOX
73-year-old male past medical history of brain tumor, DI, DVT/PE on Xarelto, HLD, hypothyroid, OPA presents for headache and lethargy, nausea, 1 episode of diarrhea. Due to generalized malaise, he was not able to take his daily medications, including hydrocortisone, desmopressin, thyroxine, xarelto, and depakote/ topiramate.    - pending cmp, assess Na level  - lactate 3.5. Will give iv fluids if Na is normal  - pending cxr, ua, rvp     CT head noncon given hx sellar tumor

## 2024-02-04 NOTE — ED ADULT NURSE NOTE - NSFALLHARMRISKINTERV_ED_ALL_ED
Assistance OOB with selected safe patient handling equipment if applicable/Assistance with ambulation/Communicate risk of Fall with Harm to all staff, patient, and family/Encourage patient to sit up slowly, dangle for a short time, stand at bedside before walking/Monitor gait and stability/Orthostatic vital signs/Provide visual cue: red socks, yellow wristband, yellow gown, etc/Reinforce activity limits and safety measures with patient and family/Bed in lowest position, wheels locked, appropriate side rails in place/Call bell, personal items and telephone in reach/Instruct patient to call for assistance before getting out of bed/chair/stretcher/Non-slip footwear applied when patient is off stretcher/[place] to call system/Physically safe environment - no spills, clutter or unnecessary equipment/Purposeful Proactive Rounding/Room/bathroom lighting operational, light cord in reach

## 2024-02-04 NOTE — ED PROVIDER NOTE - ATTENDING CONTRIBUTION TO CARE
73-year-old male past medical history of diabetes insipidus s/p pituitary tumor removal, DVT/PE on Xarelto, HLD, hypothyroid here for 2 days of generalized weakness and mild headache a/w nausea. Denies vomiting, cp, sob, abd pain. Hasn't been able to take any of his home medications because of his weakness.   AP - nontoxic appearing. will check lytes as he has not been able to take his meds. check CTH given history. infectious w/up

## 2024-02-04 NOTE — ED PROVIDER NOTE - NS ED ROS FT
Const: Denies fever, chills  HEENT: Denies blurry vision, sore throat  Neck: Denies neck pain/stiffness  Resp: Denies coughing, SOB  Cardiovascular: Denies CP, palpitations, LE edema  GI: Denies nausea, vomiting, abdominal pain, diarrhea, constipation  : Denies urinary frequenc, dysuria  Neuro: Denies HA, dizziness, numbness, weakness  Skin: Denies rashes. Const: Denies fever, + chills  HEENT: Denies blurry vision, sore throat  Neck: Denies neck pain/stiffness  Resp: +coughing, Denies SOB  Cardiovascular: Denies CP, palpitations, LE edema  GI: +nausea, diarrhea, abdominal pain,  Denies vomiting,constipation   : Denies urinary frequenc, dysuria  Neuro: +weakness HA, Denies dizziness, numbness,   Skin: Denies rashes.

## 2024-02-04 NOTE — ED ADULT NURSE REASSESSMENT NOTE - NS ED NURSE REASSESS COMMENT FT1
Pt alert and oriented x 4. Pt states that his headache has subsided, but he is still having abdominal pain. Pt denies chest pain, shortness of breath, or dizziness at this time. Pts respirations are equal and unlabored at this time. Pt connected to continuos cardiac monitor and pulse ox. Pt left in position of comfort, bed of wheels locked and in lowest position.

## 2024-02-04 NOTE — ED PROVIDER NOTE - PROGRESS NOTE DETAILS
Na and K normal  - will give home dose .25 mg desmopressin  - 1 L LR bolus  - encourage free water intake    CT head without mass effect or bleed Pt's lethargy improved, repeat lactate pending Repeat lactate normal 1.2, Na 140, Cr downtrend 1.75 -> 1.49  Will discharge patient home

## 2024-02-04 NOTE — ED ADULT NURSE NOTE - OBJECTIVE STATEMENT
Pt complaining of headache, abdominal pain, fever, dizziness and chills that started 2-3 days ago. Pt states that he has gas, and has had not a bowel movement since Thursday. Pt denies chest pain or shortness of breath at this time. Pt A+Ox4. Respirations are equal and unlabored, speaking complete coherent sentences. Pt states he took tylenol between 7-8am for the headache and fever.

## 2024-02-04 NOTE — ED ADULT NURSE REASSESSMENT NOTE - NS ED NURSE REASSESS COMMENT FT1
Pt alert and oriented x 4. Pt denies chest pain or shortness of breath at this time. Respirations are equal and unlabored. Vital signs recorded. Discharge paperwork provided by MD. Pt wheeled to car.

## 2024-02-04 NOTE — ED PROVIDER NOTE - PHYSICAL EXAMINATION
Const: Awake, alert and oriented. appear fatigued  HEENT: NC/AT. dry mucous membranes.  Eyes: No scleral icterus. EOMI.  Neck:. Soft and supple. Full ROM without pain.  Cardiac: tachy rate and irregular rhythm. +S1/S2. No murmurs. Peripheral pulses 2+ and symmetric. No LE edema.  Resp: Speaking in full sentences. No evidence of respiratory distress. No wheezes, rales or rhonchi.  Abd: Soft, non-tender, non-distended. Normal bowel sounds in all 4 quadrants. No guarding or rebound.  Back: Spine midline and non-tender. No CVAT.  Skin: No rashes, abrasions or lacerations.  Neuro: Awake, alert & oriented x 3. Moves all extremities symmetrically.

## 2024-02-04 NOTE — ED PROVIDER NOTE - OBJECTIVE STATEMENT
73-year-old male past medical history of brain tumor, DI, DVT/PE on Xarelto, HLD, hypothyroid, OPA presents for headache and lethargy. Thursday, the patient had nausea but no vomiting and 1 episode of diarrhea. He also had headache and lethargy. Due to generalized malaise, he was not able to take his daily medications, including hydrocortisone, desmopressin, thyroxine, xarelto, and depakote/ topiramate.  He also noticed chills yesterday, frequent urination and excessive thirst.  He currently also has generalized abdominal pain.

## 2024-02-04 NOTE — ED PROVIDER NOTE - PATIENT PORTAL LINK FT
You can access the FollowMyHealth Patient Portal offered by [facility] by registering at the following website: [email or web address]. By joining [facility]’s FollowMyHealth portal, you will also be able to view your health information using other applications (apps) compatible with our system.

## 2024-02-04 NOTE — ED ADULT NURSE NOTE - NS PRO PASSIVE SMOKE EXP
normal-appearing skin, using the technique of Mohs. A map was prepared to correspond to the area of skin from which it was excised. Hemostasis was achieved using electrosurgery. The wound was bandaged. The tissue was prepared for the cryostat and sectioned. 1 section(s) prepared. Each section was coded, cut, and stained for microscopic examination. The entire base and margins of the excised piece of tissue were examined by the surgeon. Stage I:  Small nerve with perineural cuffing of hyperchromatic cells. The remaining tumor was noted and the next stage was performed. Stage II:  A thin layer of tissue was removed at the histologically-identified sites of remaining tumor. The entire procedure as described in stage I was repeated to process the tissue according to Mohs technique. 1 section(s) prepared for stage II. No tumor was identified at the peripheral margins of stage II of microscopically controlled surgery. DEFECT MANAGEMENT:    REPAIR DESCRIPTION:  Various closure modalities were discussed with the patient, and it was decided that an intermediate layered repair would best preserve normal anatomic and functional relationships. Additional risk of wound dehiscence was discussed. The area was anesthetized with 1% lidocaine with epinephrine 1:100,000 buffered, was given a sterile prep using Chlorhexidine gluconate 4% solution and draped in the usual sterile fashion. Recreation and enlargement of the wound was performed by excising cones of tissue via the triangulation technique. The final incision lines were placed with respect for the patient's natural skin tension lines in a lazy S configuration to avoid functional and aesthetic distortion of adjacent free margins. Following minimal undermining, meticulous hemostasis was obtained with spot monopolar electrocoagulation.   Subcutaneous dead space and dermis were closed using 5-0 Vicryl buried subcutaneous interrupted suture and the epidermis was approximated with 6-0 Fast absorbing gut running epidermal sutures. WOUND COVERAGE:  The wound was cleaned with normal saline solution, dried off, Aquaphor ointment was applied, and the wound was covered. A dressing was applied for stabilization and light pressure. The patient was given detailed oral and written instructions on postoperative care. There were no complications. The patient left the Unit in good medical condition. FOLLOW-UP:  As dissolving sutures were placed, the patient was asked to return if any questions or concerns arose, but otherwise will return to see general dermatology per their instructions. No

## 2024-02-04 NOTE — ED ADULT TRIAGE NOTE - CHIEF COMPLAINT QUOTE
c/o "feeling unwell for few days and not taking meds" ; + lethargy, malaise/fatigue, intermittent headache, generalized abd pain, nausea , diarrhea x thursday after receiving RSV & shingles vaccine. hx Diabetes insipidus, on DDAVP.

## 2024-03-11 ENCOUNTER — OUTPATIENT (OUTPATIENT)
Dept: OUTPATIENT SERVICES | Facility: HOSPITAL | Age: 74
LOS: 1 days | End: 2024-03-11
Payer: MEDICARE

## 2024-03-11 ENCOUNTER — TRANSCRIPTION ENCOUNTER (OUTPATIENT)
Age: 74
End: 2024-03-11

## 2024-03-11 VITALS
SYSTOLIC BLOOD PRESSURE: 119 MMHG | DIASTOLIC BLOOD PRESSURE: 64 MMHG | WEIGHT: 259.93 LBS | HEIGHT: 71 IN | OXYGEN SATURATION: 93 % | RESPIRATION RATE: 13 BRPM | HEART RATE: 85 BPM | TEMPERATURE: 97 F

## 2024-03-11 VITALS
DIASTOLIC BLOOD PRESSURE: 59 MMHG | SYSTOLIC BLOOD PRESSURE: 122 MMHG | RESPIRATION RATE: 16 BRPM | HEART RATE: 85 BPM | OXYGEN SATURATION: 94 %

## 2024-03-11 DIAGNOSIS — Z98.890 OTHER SPECIFIED POSTPROCEDURAL STATES: Chronic | ICD-10-CM

## 2024-03-11 DIAGNOSIS — Z95.828 PRESENCE OF OTHER VASCULAR IMPLANTS AND GRAFTS: Chronic | ICD-10-CM

## 2024-03-11 DIAGNOSIS — K22.719 BARRETT'S ESOPHAGUS WITH DYSPLASIA, UNSPECIFIED: ICD-10-CM

## 2024-03-11 LAB — GLUCOSE BLDC GLUCOMTR-MCNC: 82 MG/DL — SIGNIFICANT CHANGE UP (ref 70–99)

## 2024-03-11 PROCEDURE — 82962 GLUCOSE BLOOD TEST: CPT

## 2024-03-11 PROCEDURE — 43270 EGD LESION ABLATION: CPT

## 2024-03-11 PROCEDURE — C1886: CPT

## 2024-03-11 DEVICE — CATH CHANNEL RFA ENDOSCOPSIC: Type: IMPLANTABLE DEVICE | Status: FUNCTIONAL

## 2024-03-11 RX ORDER — FUROSEMIDE 40 MG
1 TABLET ORAL
Qty: 0 | Refills: 0 | DISCHARGE

## 2024-03-11 RX ORDER — METFORMIN HYDROCHLORIDE 850 MG/1
1 TABLET ORAL
Qty: 0 | Refills: 0 | DISCHARGE

## 2024-03-11 RX ORDER — TOPIRAMATE 25 MG
1 TABLET ORAL
Qty: 0 | Refills: 0 | DISCHARGE

## 2024-03-11 RX ORDER — ATORVASTATIN CALCIUM 80 MG/1
1 TABLET, FILM COATED ORAL
Qty: 0 | Refills: 0 | DISCHARGE

## 2024-03-11 RX ORDER — PANTOPRAZOLE SODIUM 20 MG/1
1 TABLET, DELAYED RELEASE ORAL
Qty: 0 | Refills: 0 | DISCHARGE

## 2024-03-11 RX ORDER — HYDROCORTISONE 20 MG
3 TABLET ORAL
Qty: 0 | Refills: 0 | DISCHARGE

## 2024-03-11 RX ORDER — TOPIRAMATE 25 MG
2 TABLET ORAL
Qty: 0 | Refills: 0 | DISCHARGE

## 2024-03-11 RX ORDER — POTASSIUM CHLORIDE 20 MEQ
1 PACKET (EA) ORAL
Qty: 0 | Refills: 0 | DISCHARGE

## 2024-03-11 RX ORDER — DESMOPRESSIN ACETATE 0.1 MG/1
1 TABLET ORAL
Qty: 0 | Refills: 0 | DISCHARGE

## 2024-03-11 RX ORDER — ASPIRIN/CALCIUM CARB/MAGNESIUM 324 MG
1 TABLET ORAL
Qty: 0 | Refills: 0 | DISCHARGE

## 2024-03-11 RX ORDER — DULAGLUTIDE 4.5 MG/.5ML
0 INJECTION, SOLUTION SUBCUTANEOUS
Qty: 0 | Refills: 0 | DISCHARGE

## 2024-03-11 RX ORDER — SUCRALFATE 1 G
1 TABLET ORAL
Refills: 0 | DISCHARGE

## 2024-03-11 RX ORDER — HYDROCORTISONE 20 MG
1 TABLET ORAL
Qty: 0 | Refills: 0 | DISCHARGE

## 2024-03-11 RX ORDER — METOPROLOL TARTRATE 50 MG
1 TABLET ORAL
Refills: 0 | DISCHARGE

## 2024-03-11 RX ORDER — RIVAROXABAN 15 MG-20MG
1 KIT ORAL
Qty: 0 | Refills: 0 | DISCHARGE

## 2024-03-11 RX ORDER — VENLAFAXINE HCL 75 MG
1 CAPSULE, EXT RELEASE 24 HR ORAL
Qty: 0 | Refills: 0 | DISCHARGE

## 2024-03-11 RX ORDER — LEVETIRACETAM 250 MG/1
1 TABLET, FILM COATED ORAL
Qty: 0 | Refills: 0 | DISCHARGE

## 2024-03-11 RX ORDER — METOCLOPRAMIDE HCL 10 MG
1 TABLET ORAL
Qty: 0 | Refills: 0 | DISCHARGE

## 2024-03-11 RX ORDER — CHOLECALCIFEROL (VITAMIN D3) 125 MCG
1 CAPSULE ORAL
Qty: 0 | Refills: 0 | DISCHARGE

## 2024-03-11 RX ORDER — SODIUM CHLORIDE 9 MG/ML
500 INJECTION INTRAMUSCULAR; INTRAVENOUS; SUBCUTANEOUS
Refills: 0 | Status: DISCONTINUED | OUTPATIENT
Start: 2024-03-11 | End: 2024-03-25

## 2024-03-11 RX ORDER — TAMSULOSIN HYDROCHLORIDE 0.4 MG/1
2 CAPSULE ORAL
Qty: 0 | Refills: 0 | DISCHARGE

## 2024-03-11 RX ORDER — FERROUS SULFATE 325(65) MG
1 TABLET ORAL
Qty: 0 | Refills: 0 | DISCHARGE

## 2024-03-11 NOTE — ASU PATIENT PROFILE, ADULT - FALL HARM RISK - UNIVERSAL INTERVENTIONS
Bed in lowest position, wheels locked, appropriate side rails in place/Call bell, personal items and telephone in reach/Instruct patient to call for assistance before getting out of bed or chair/Non-slip footwear when patient is out of bed/[place] to call system/Physically safe environment - no spills, clutter or unnecessary equipment/Purposeful Proactive Rounding/Room/bathroom lighting operational, light cord in reach

## 2024-03-11 NOTE — PRE PROCEDURE NOTE - PRE PROCEDURE EVALUATION
Attending Physician:     Jhon Lopez MD                       Procedure:  EGD RFA    Indication for Procedure:  [place]'s with dysplasia s/p RFA and for ongoing assessment  ________________________________________________________  PAST MEDICAL & SURGICAL HISTORY:  PE (pulmonary thromboembolism)      DVT (deep venous thrombosis)      DM (diabetes mellitus)      Diabetes insipidus  (pt denies)      HLD (hyperlipidemia)      THOMAS (obstructive sleep apnea)      Hypothyroid      Intractable tension-type headache      Brain tumor      S/P craniotomy  x2      S/P IVC filter        ALLERGIES:  No Known Allergies    HOME MEDICATIONS:  acetaminophen 325 mg oral tablet: 2 tab(s) orally every 6 hours, As needed, Mild Pain (1 - 3)  Aspirin Enteric Coated 81 mg oral delayed release tablet: 1 tab(s) orally once a day  atorvastatin 40 mg oral tablet: 1 tab(s) orally once a day  desmopressin 0.1 mg oral tablet: 1 tab(s) orally once a day (in the evening)  ferrous sulfate 325 mg (65 mg elemental iron) oral tablet: 1 tab(s) orally once a day  furosemide 20 mg oral tablet: 1 tab(s) orally once a day  hydrocortisone 5 mg oral tablet: 3 tab(s) orally once a day (in the morning)  hydrocortisone 5 mg oral tablet: 1 tab(s) orally once a day (in the evening)  levETIRAcetam 500 mg oral tablet, extended release: 1 tab(s) orally once a day (at bedtime)  levothyroxine 137 mcg (0.137 mg) oral tablet: 1 tab(s) orally once a day  metFORMIN 1000 mg oral tablet: 1 tab(s) orally 2 times a day  metoclopramide 10 mg oral tablet: 1 tab(s) orally once a day  pantoprazole 40 mg oral delayed release tablet: 1 tab(s) orally once a day  potassium chloride 10 mEq oral capsule, extended release: 1 cap(s) orally once a day  tamsulosin 0.4 mg oral capsule: 2 cap(s) orally once a day (at bedtime)  topiramate 50 mg oral tablet: 2 tab(s) orally once a day (at bedtime)  topiramate 50 mg oral tablet: 1 tab(s) orally once a day (in the morning) Need to follow up with neuro in 1 week to discuss if need to increase am dose to 2 tabs   Trulicity Pen 1.5 mg/0.5 mL subcutaneous solution: Inject 1.5mg subcutaneously once a week on Mon  venlafaxine 150 mg oral capsule, extended release: 1 cap(s) orally once a day  Vitamin D3 5000 intl units (125 mcg) oral tablet: 1 tab(s) orally once a day  Xarelto 20 mg oral tablet: 1 tab(s) orally once a day (in the evening)  Xyosted 75 mg/0.5 mL subcutaneous solution: Inject 75mg subcutaneously once a week on Thursday    AICD/PPM: [ ] yes   [ x] no    PERTINENT LAB DATA:                      PHYSICAL EXAMINATION:    Height (cm): 180.3  Weight (kg): 117.9  BMI (kg/m2): 36.3  BSA (m2): 2.36T(C): --  HR: --  BP: --  RR: --  SpO2: --    Constitutional: NAD  HEENT: PERRLA, EOMI,    Neck:  No JVD  Respiratory: CTAB/L  Cardiovascular: S1 and S2  Gastrointestinal: BS+, soft, NT/ND  Extremities: No peripheral edema  Neurological: A/O x 3, no focal deficits  Psychiatric: Normal mood, normal affect  Skin: No rashes    ASA Class: I [ ]  II [ ]  III [x ]  IV [ ]    COMMENTS:    The patient is a suitable candidate for the planned procedure unless box checked [ ]  No, explain:

## 2024-03-11 NOTE — ASU PATIENT PROFILE, ADULT - NSICDXPASTMEDICALHX_GEN_ALL_CORE_FT
PAST MEDICAL HISTORY:  Brain tumor     Diabetes insipidus (pt denies)    DM (diabetes mellitus)     DVT (deep venous thrombosis)     HLD (hyperlipidemia)     Hypothyroid     Intractable tension-type headache     THOMAS (obstructive sleep apnea)     PE (pulmonary thromboembolism)

## 2024-03-29 NOTE — ED PROVIDER NOTE - NSFOLLOWUPINSTRUCTIONS_ED_ALL_ED_FT
----- Message from Amie Hilton MD sent at 3/29/2024  3:49 PM CDT -----  Please call the patient stress echocardiogram is negative.  No major abnormalities based on the results.  EKG showed no ischemic changes.  Regular follow-up   You were seen for lethargy. Your condition improved after 1 liter of IV fluids. You sodium and potassium were normal. The imaging of your head (CT) did not show bleeding.    Follow up with your primary care doctor in the next few days.      Dehydration, Elderly    Dehydration is a condition in which there is not enough water or other fluids in the body. This happens when a person loses more fluids than he or she takes in. Important organs, such as the kidneys, brain, and heart, cannot function without a proper amount of fluids. Any loss of fluids from the body can lead to dehydration.    People 65 years of age or older have a higher risk of dehydration than younger adults. This is because in older age, the body:    Is less able to maintain the right amount of water.  Does not respond to temperature changes as well.  Does not get a sense of thirst as easily or quickly.    Dehydration can be mild, moderate, or severe. It should be treated right away to prevent it from becoming severe.    What are the causes?  Dehydration may be caused by:    Conditions that cause loss of water or other fluids, such as diarrhea, vomiting, or sweating or urinating a lot.  Not drinking enough fluids, especially when you are ill or doing activities that require a lot of energy.  Other illnesses and conditions, such as fever or infection.  Certain medicines, such as medicines that remove excess fluid from the body (diuretics).  Lack of safe drinking water.  Not being able to get enough water and food.    What increases the risk?  This condition is more likely to develop in people who:    Have a long-term (chronic) illness, such as:    An illness that may cause you to urinate more, such as diabetes.  Kidney, heart, or lung disease that have not been treated properly.  A disease of the brain and nervous system or a disorder that affects your thinking and emotions, such as dementia.  Are 65 years or older.  Have a disability.  Live in a place that is high in altitude, where thinner, drier air causes you to have more fluid loss.    What are the signs or symptoms?  Symptoms of dehydration depend on how severe it is.        Mild or moderate dehydration    Thirst.  Dry lips or dry mouth.  Dizziness or light-headedness, especially when standing up from a seated position.  Muscle cramps.  Dark urine. Urine may be the color of tea.  Less urine or tears produced than usual.  Headache.        Severe dehydration    Changes in skin. Your skin may be cold and clammy, blotchy, or pale. Your skin also may not return to normal after being lightly pinched and released.  Little or no tears, urine, or sweat.  Changes in vital signs, such as rapid breathing and low blood pressure. Your pulse may be weak or may be faster than 100 beats a minute when you are sitting still.  Other changes, such as:    Feeling very thirsty.  Sunken eyes.  Cold hands and feet.  Confusion.  Being very tired (lethargic) or having trouble waking from sleep.  Short-term weight loss.  Loss of consciousness.    How is this diagnosed?  This condition is diagnosed based on your symptoms and a physical exam. Blood and urine tests may help confirm the diagnosis.    How is this treated?  Treatment for this condition depends on how severe it is. Treatment should be started right away. Do not wait until dehydration becomes severe. Severe dehydration is an emergency and needs to be treated in a hospital.    Mild or moderate dehydration can often be treated at home. You may be asked to:    Drink more fluids.  Drink an oral rehydration solution (ORS). This drink helps restore proper amounts of fluids and salts and minerals in the blood (electrolytes).  Severe dehydration can be treated:    With IV fluids.  By correcting abnormal levels of electrolytes. This is often done by giving electrolytes through a tube that is passed through your nose and into your stomach (nasogastric tube, or NG tube).  By treating the underlying cause of dehydration.    Follow these instructions at home:      Oral rehydration solution    If told by your health care provider, drink an ORS:    Make an ORS by following instructions on the package.  Start by drinking small amounts, about ½ cup (120 mL) every 5–10 minutes.  Slowly increase how much you drink until you have taken the amount recommended by your health care provider.        Eating and drinking       Drink enough clear fluid to keep your urine pale yellow. If you were told to drink an ORS, finish the ORS first and then start slowly drinking other clear fluids. Drink fluids such as:    Water. Do not drink only water. Doing that can lead to hyponatremia, which is having too little salt (sodium) in the body.  Water from ice chips you suck on.  Fruit juice that you have added water to (diluted fruit juice).  Low-calorie sports drinks.  Eat foods that contain a healthy balance of electrolytes, such as bananas, oranges, potatoes, tomatoes, and spinach.  Do not drink alcohol.  Avoid the following:    Drinks that contain a lot of sugar. These include high-calorie sports drinks, fruit juice that is not diluted, and soda.  Caffeine.  Foods that are greasy or contain a lot of fat or sugar.        General instructions    Take over-the-counter and prescription medicines only as told by your health care provider.  Do not take sodium tablets. This can lead to having too much sodium in the body (hypernatremia).  Return to your normal activities as told by your health care provider. Ask your health care provider what activities are safe for you.  Keep all follow-up visits as told by your health care provider. This is important.    Contact a health care provider if you:  Have pain in your abdomen and the pain gets worse or stays in one area (localizes).  Have a rash.  Have a stiff neck.  Are more irritable than usual.  Are sleepier or have a harder time waking than usual.  Feel weak or dizzy.  Are very thirsty.    Get help right away if you have:  Any symptoms of severe dehydration.  A fever.  A severe headache.  Symptoms of vomiting, such as:    Your vomiting gets worse or does not go away.  Your vomit includes blood or green matter (bile).  You cannot eat or drink without vomiting.  Problems with urination or bowel movements, such as:    Diarrhea that gets worse or does not go away.  Blood in your stool (feces). This may cause stool to look black and tarry.  Not urinating, or urinating only a small amount of very dark urine, within 6–8 hours.  Trouble breathing.  Symptoms that get worse with treatment.    These symptoms may represent a serious problem that is an emergency. Do not wait to see if the symptoms will go away. Get medical help right away. Call your local emergency services (911 in the [place] Do not drive yourself to the hospital.    Summary  Dehydration is a condition in which there is not enough water or other fluids in the body. This happens when a person loses more fluids than he or she takes in.  Treatment for this condition depends on the severity. Treatment should be started right away. Do not wait until dehydration becomes severe.  Drink enough clear fluid to keep your urine pale yellow. If you were told to drink an oral rehydration solution (ORS), finish the ORS first and then start slowly drinking other clear fluids.  Take over-the-counter and prescription medicines only as told by your health care provider.  Get help right away if you have any symptoms of severe dehydration.    ADDITIONAL NOTES AND INSTRUCTIONS    Please follow up with your Primary MD in 24-48 hr.  Seek immediate medical care for any new/worsening signs or symptoms.     Document Released: 3/9/2005 Document Revised: 7/30/2020 Document Reviewed: 7/30/2020  [employer] Interactive Patient Education ©2019 [employer] This information is not intended to replace advice given to you by your health care provider. Make sure you discuss any questions you have with your health care provider. You were seen for lethargy. Your condition improved after 1 liter of IV fluids. You sodium and potassium were normal. The imaging of your head (CT) did not show bleeding.    Follow up with your primary care doctor in the next few days.      Return Precautions:  If the lethargy returns or if you experience fevers, please return to the ED    Dehydration, Elderly    Dehydration is a condition in which there is not enough water or other fluids in the body. This happens when a person loses more fluids than he or she takes in. Important organs, such as the kidneys, brain, and heart, cannot function without a proper amount of fluids. Any loss of fluids from the body can lead to dehydration.    People 65 years of age or older have a higher risk of dehydration than younger adults. This is because in older age, the body:    Is less able to maintain the right amount of water.  Does not respond to temperature changes as well.  Does not get a sense of thirst as easily or quickly.    Dehydration can be mild, moderate, or severe. It should be treated right away to prevent it from becoming severe.    What are the causes?  Dehydration may be caused by:    Conditions that cause loss of water or other fluids, such as diarrhea, vomiting, or sweating or urinating a lot.  Not drinking enough fluids, especially when you are ill or doing activities that require a lot of energy.  Other illnesses and conditions, such as fever or infection.  Certain medicines, such as medicines that remove excess fluid from the body (diuretics).  Lack of safe drinking water.  Not being able to get enough water and food.    What increases the risk?  This condition is more likely to develop in people who:    Have a long-term (chronic) illness, such as:    An illness that may cause you to urinate more, such as diabetes.  Kidney, heart, or lung disease that have not been treated properly.  A disease of the brain and nervous system or a disorder that affects your thinking and emotions, such as dementia.  Are 65 years or older.  Have a disability.  Live in a place that is high in altitude, where thinner, drier air causes you to have more fluid loss.    What are the signs or symptoms?  Symptoms of dehydration depend on how severe it is.        Mild or moderate dehydration    Thirst.  Dry lips or dry mouth.  Dizziness or light-headedness, especially when standing up from a seated position.  Muscle cramps.  Dark urine. Urine may be the color of tea.  Less urine or tears produced than usual.  Headache.        Severe dehydration    Changes in skin. Your skin may be cold and clammy, blotchy, or pale. Your skin also may not return to normal after being lightly pinched and released.  Little or no tears, urine, or sweat.  Changes in vital signs, such as rapid breathing and low blood pressure. Your pulse may be weak or may be faster than 100 beats a minute when you are sitting still.  Other changes, such as:    Feeling very thirsty.  Sunken eyes.  Cold hands and feet.  Confusion.  Being very tired (lethargic) or having trouble waking from sleep.  Short-term weight loss.  Loss of consciousness.    How is this diagnosed?  This condition is diagnosed based on your symptoms and a physical exam. Blood and urine tests may help confirm the diagnosis.    How is this treated?  Treatment for this condition depends on how severe it is. Treatment should be started right away. Do not wait until dehydration becomes severe. Severe dehydration is an emergency and needs to be treated in a hospital.    Mild or moderate dehydration can often be treated at home. You may be asked to:    Drink more fluids.  Drink an oral rehydration solution (ORS). This drink helps restore proper amounts of fluids and salts and minerals in the blood (electrolytes).  Severe dehydration can be treated:    With IV fluids.  By correcting abnormal levels of electrolytes. This is often done by giving electrolytes through a tube that is passed through your nose and into your stomach (nasogastric tube, or NG tube).  By treating the underlying cause of dehydration.    Follow these instructions at home:      Oral rehydration solution    If told by your health care provider, drink an ORS:    Make an ORS by following instructions on the package.  Start by drinking small amounts, about ½ cup (120 mL) every 5–10 minutes.  Slowly increase how much you drink until you have taken the amount recommended by your health care provider.        Eating and drinking       Drink enough clear fluid to keep your urine pale yellow. If you were told to drink an ORS, finish the ORS first and then start slowly drinking other clear fluids. Drink fluids such as:    Water. Do not drink only water. Doing that can lead to hyponatremia, which is having too little salt (sodium) in the body.  Water from ice chips you suck on.  Fruit juice that you have added water to (diluted fruit juice).  Low-calorie sports drinks.  Eat foods that contain a healthy balance of electrolytes, such as bananas, oranges, potatoes, tomatoes, and spinach.  Do not drink alcohol.  Avoid the following:    Drinks that contain a lot of sugar. These include high-calorie sports drinks, fruit juice that is not diluted, and soda.  Caffeine.  Foods that are greasy or contain a lot of fat or sugar.        General instructions    Take over-the-counter and prescription medicines only as told by your health care provider.  Do not take sodium tablets. This can lead to having too much sodium in the body (hypernatremia).  Return to your normal activities as told by your health care provider. Ask your health care provider what activities are safe for you.  Keep all follow-up visits as told by your health care provider. This is important.    Contact a health care provider if you:  Have pain in your abdomen and the pain gets worse or stays in one area (localizes).  Have a rash.  Have a stiff neck.  Are more irritable than usual.  Are sleepier or have a harder time waking than usual.  Feel weak or dizzy.  Are very thirsty.    Get help right away if you have:  Any symptoms of severe dehydration.  A fever.  A severe headache.  Symptoms of vomiting, such as:    Your vomiting gets worse or does not go away.  Your vomit includes blood or green matter (bile).  You cannot eat or drink without vomiting.  Problems with urination or bowel movements, such as:    Diarrhea that gets worse or does not go away.  Blood in your stool (feces). This may cause stool to look black and tarry.  Not urinating, or urinating only a small amount of very dark urine, within 6–8 hours.  Trouble breathing.  Symptoms that get worse with treatment.    These symptoms may represent a serious problem that is an emergency. Do not wait to see if the symptoms will go away. Get medical help right away. Call your local emergency services (911 in the [place] Do not drive yourself to the hospital.    Summary  Dehydration is a condition in which there is not enough water or other fluids in the body. This happens when a person loses more fluids than he or she takes in.  Treatment for this condition depends on the severity. Treatment should be started right away. Do not wait until dehydration becomes severe.  Drink enough clear fluid to keep your urine pale yellow. If you were told to drink an oral rehydration solution (ORS), finish the ORS first and then start slowly drinking other clear fluids.  Take over-the-counter and prescription medicines only as told by your health care provider.  Get help right away if you have any symptoms of severe dehydration.    ADDITIONAL NOTES AND INSTRUCTIONS    Please follow up with your Primary MD in 24-48 hr.  Seek immediate medical care for any new/worsening signs or symptoms.     Document Released: 3/9/2005 Document Revised: 7/30/2020 Document Reviewed: 7/30/2020  [employer] Interactive Patient Education ©2019 [employer] This information is not intended to replace advice given to you by your health care provider. Make sure you discuss any questions you have with your health care provider.

## 2024-07-01 ENCOUNTER — APPOINTMENT (OUTPATIENT)
Dept: NEUROLOGY | Facility: CLINIC | Age: 74
End: 2024-07-01
Payer: MEDICARE

## 2024-07-01 VITALS
TEMPERATURE: 98.2 F | SYSTOLIC BLOOD PRESSURE: 110 MMHG | HEIGHT: 71 IN | WEIGHT: 265 LBS | HEART RATE: 81 BPM | DIASTOLIC BLOOD PRESSURE: 68 MMHG | BODY MASS INDEX: 37.1 KG/M2

## 2024-07-01 DIAGNOSIS — R56.9 UNSPECIFIED CONVULSIONS: ICD-10-CM

## 2024-07-01 DIAGNOSIS — G44.201 TENSION-TYPE HEADACHE, UNSPECIFIED, INTRACTABLE: ICD-10-CM

## 2024-07-01 DIAGNOSIS — G43.909 MIGRAINE, UNSPECIFIED, NOT INTRACTABLE, W/OUT STATUS MIGRAINOSUS: ICD-10-CM

## 2024-07-01 DIAGNOSIS — F09 UNSPECIFIED MENTAL DISORDER DUE TO KNOWN PHYSIOLOGICAL CONDITION: ICD-10-CM

## 2024-07-01 PROBLEM — E23.2 DIABETES INSIPIDUS: Chronic | Status: ACTIVE | Noted: 2021-01-23

## 2024-07-01 PROCEDURE — G2211 COMPLEX E/M VISIT ADD ON: CPT

## 2024-07-01 PROCEDURE — 99214 OFFICE O/P EST MOD 30 MIN: CPT

## 2024-07-01 RX ORDER — VIBEGRON 75 MG/1
75 TABLET, FILM COATED ORAL DAILY
Refills: 0 | Status: ACTIVE | COMMUNITY

## 2024-07-01 RX ORDER — SITAGLIPTIN 50 MG/1
50 TABLET, FILM COATED ORAL DAILY
Refills: 0 | Status: ACTIVE | COMMUNITY

## 2024-08-15 NOTE — DISCHARGE NOTE NURSING/CASE MANAGEMENT/SOCIAL WORK - NSDCPEFALRISK_GEN_ALL_CORE
Hospitalist Discharge Summary        Admit Date:  8/9/2024    Discharge Date: 8/15/2024    Consults:   Hematology oncology  Infectious disease  Palliative medicine  Wound care    Discharge Diagnoses:   Newly diagnosed locally invasive breast cancer with fungating/infected mass, present on admission  Essential hypertension, present on admission    Hospital Course/Synopsis:   80-year-old female with past medical history of hypertension, lives alone in a senior housing, social/living issues including bedbugs is admitted with left fungating mass/wound/locally invasive breast cancer was admitted due to infected left upper extremity wound/infection.  She was placed on IV antibiotics including cefazolin and.  Symptoms improved somewhat.  Patient underwent biopsy which showed estrogen positive progesterone positive locally invasive breast cancer she has been placed on Arimidex as per hematology oncology.  She was evaluated by wound care it is recommended that patient need to follow-up in outpatient basis with wound care 3 times a week.  Had a long discussion with /social work/palliative medicine/infectious disease as the patient adamantly has refused to go to subacute rehab.  She is being discharged to her facility PT/OT.  Arrangements are being made.  As per patient she has transportation that we will take her to the wound care and doctors appointment.    Symptoms and Physical Exam on Date of Discharge:   GENERAL: 80-year-old female awake, alert, not in distress  HEENT: PERRLA, moist mucous membrane  NECK;  Supple, no thyroid is not enlarged, no JVD  CHEST;  large necrotic breast mass left  CVS:  Normal S1S2, no murmurs, rubs, or gallops  LUNGS:  Breathing is nonlabored, no crackles, no wheezing  ABD:  Nontender, nondistended, no palpable masses  EXT:  Full ROM of all four extremities, without joint warmth or joint tenderness.    Vital 24 Hour Range   Temperature Temp  Min: 97.5 °F (36.4 °C)  Max: 98.1 °F (36.7  °C)   Pulse Pulse  Min: 71  Max: 88   Respiratory Resp  Min: 16  Max: 16   Non-Invasive  Blood Pressure BP  Min: 127/78  Max: 149/67   Pulse Oximetry SpO2  Min: 95 %  Max: 96 %       Laboratory Results:  Recent Labs   Lab 08/15/24  0450 08/14/24  0432 08/12/24  0507 08/09/24  2235 08/09/24  2229   SODIUM 140 140 141   < > 138   POTASSIUM 3.8 4.0 4.0   < > 4.1   CHLORIDE 110 109 109   < > 106   CO2 26 27 25   < > 26   BUN 23* 23* 14   < > 22*   CREATININE 0.69 0.79 0.70   < > 0.83   GLUCOSE 126* 107* 99   < > 111*   WBC 7.2 7.3 7.5   < > 9.2   HGB 11.4* 11.8* 12.2   < > 14.2   HCT 34.7* 34.9* 36.8   < > 41.3    222 224   < > 258   PT  --   --   --   --  10.9   INR  --   --   --   --  1.0   PTT  --   --   --   --  <21*    < > = values in this interval not displayed.       Discharge Instructions/Follow-up:   Follow-up with hematology oncology in 1 to 2 weeks  Follow with primary care doctor in 1 week  Follow-up with clinic in 3 times a week    Disposition:  Home with PT/OT/RN.    Goals of Care/Advance Directive:  DNR    Medications (new/changed or adjusted/discontinued):  See discharge med rec    Procedures Performed:  Left breast biopsy    Imaging Studies (impression only):  US BREAST BIOPSY 1 LESION LEFT, US AXILLA LYMPH NODE BIOPSY 1 LESION LEFT    Addendum Date: 8/14/2024 Addendum:   Addendum: Pathology result from patient's left breast ultrasound-guided biopsy 8/12/2024 and left axillary lymph node ultrasound biopsy 8/12/2024 have returned: Left breast 4:00 6 cm from the nipple: Invasive carcinoma of no specific type (ductal), see comments. Lymph node, left axilla, 1:00 15 cm from nipple: Paragraph metastatic mammary carcinoma up to 0.97 mm in size. The pathology results are malignant and concordant with the imaging. Recommend continued clinical, surgical and oncology follow-up.. Patient is currently an inpatient. Patient's ordering provider Herbert SIMPSON and Dr. LOUIS Mishra MD were notified of these  findings and recommendations by Dr. Wendy Carrizales MD. Electronically Signed by: Wendy Carrizales MD Signed on: 8/14/2024 11:50 AM Created on Workstation ID: [id number] Signed on Workstation ID: [id number]    Result Date: 8/14/2024  Narrative: US BREAST BIOPSY 1 LESION LEFT, US AXILLA LYMPH NODE BIOPSY 1 LESION LEFT CLINICAL HISTORY: Left breast suspicious finding and left axillary morphologically abnormal lymph node suspicious for metastatic disease. Biopsy recommended. COMPARISON: CT 8/9/2024 and ultrasound of 4 quadrants left breast 8/12/2024. PROCEDURE:  Imaging exams were reviewed. The procedure, including its risks and benefits were discussed with the patient. Questions were answered in full. The patient gave verbal and written informed consent. Prior to the procedure the patient provided verification of correct person, procedure and site. Preliminary ultrasound imaging of the left breast was performed.  The skin entrance site was marked. An ultrasound-guided biopsy using real-time ultrasound was performed for the approximately 14 cm mass in the upper outer and lower outer breast. Access point was determined at 4:00 6 cm from the nipple. This was described on the prior imaging reports. The skin was prepped in the usual manner. Local anesthetic was administered to the biopsy site using 1% lidocaine. A skin nick was made in the breast. The mass was approached from the lateral aspect. A 14-gauge marquee device was utilized for biopsy. Several core specimens were obtained under ultrasound guidance.  A twirl clip was placed at the biopsy site.  Hemostasis was obtained and a sterile dressing was applied. Next, preliminary ultrasound imaging of the left axilla was performed.  The skin entrance site was marked. An ultrasound-guided biopsy using real-time ultrasound was performed for the morphologically abnormal left axillary lymph node located at 1:00 15 cm from the nipple. This was described on the prior imaging  reports. The skin was prepped in the usual manner. Local anesthetic was administered to the biopsy site using 1% lidocaine. A skin nick was made in the breast. The mass was approached from the lateral aspect. A 16-gauge max core device was utilized for biopsy. 3 core specimens were obtained under ultrasound guidance.  A twirl clip was placed at the biopsy site.  Hemostasis was obtained and a sterile dressing was applied. A post procedure mammogram was not performed due to patient's physical condition. There were no post-procedure complications.     Impression: IMPRESSION: Successful ultrasound-guided biopsy of the left breast mass and left axillary level 1 abnormal lymph node. A post procedure mammogram was not performed due to patient's physical condition. The results and recommendations will subsequently be added as an addendum to this biopsy report and will be communicated to the patient. ASSESSMENT  Postprocedure Mammogram(s) for Marker Placement. Electronically Signed by: Wendy Carrizales MD Signed on: 8/12/2024 10:54 AM Created on Workstation ID: [id number] Signed on Workstation ID: [id number]    US BREAST DIAGNOSTIC ALL 4 QUADRANTS LEFT    Result Date: 8/12/2024  Narrative: EXAM:  US BREAST DIAGNOSTIC ALL 4 QUADRANTS LEFT DATE OF EXAM:  8/12/2024 9:16 AM. COMPARISON EXAMS: None. CLINICAL INDICATION: 80-year-old female presents for ultrasound evaluation of a left breast fungating necrotic mass suspicious for malignancy seen on physical exam and partially visualized on CT chest abdomen pelvis with contrast 8/9/2024.. TECHNIQUE:  US BREAST DIAGNOSTIC ALL 4 QUADRANTS LEFT. FINDINGS:  No solid or sonographically suspicious findings.     Impression: IMPRESSION: Ultrasound evaluation of all 4 quadrants and the subareolar region demonstrates a 9.2 x 9.4 by approximately 14 cm heterogeneous irregular mass with indistinct margins located in the upper outer and lower outer quadrants of the left breast. The mass is  primarily located between 12:00 - 4:00 overlying skin thickening necrosis and ulceration is noted. Trabecular thickening is present. Ultrasound evaluation of the left axilla is limited secondary to patient's decreased range of motion and mass effect from the left breast suspicious mass. There is a 2.2 x 1.5 x 3.3 cm hypoechoic oval mass with circumscribed margins at 1:00 15 cm from the nipple presumed to represent a morphologically abnormal left axillary level 1 lymph node. Closely adjacent to this is a left axillary lymph node measuring 1.1 x 0.4 cm with cortical thickness of 0.3 cm. RECOMMENDATIONS: Recommend ultrasound biopsy of the 14 cm left breast mass as well as the left axillary level 1 morphologically abnormal lymph node. BI-RADS: 5 - Highly Suggestive of Malignancy. Results and recommendations were discussed with the patient. Any questions were answered. In compliance with federal regulations, the patient will be sent a lay summary result of this report. Electronically Signed by: Wendy Carrizales MD Signed on: 8/12/2024 10:49 AM Created on Workstation ID: [id number] Signed on Workstation ID: [id number]    CT CHEST ABDOMEN PELVIS W CONTRAST    Result Date: 8/10/2024  Narrative: EXAM: CT CHEST ABDOMEN PELVIS W CONTRAST CLINICAL INDICATION: Suspected left breast cancer. COMPARISON: None. TECHNIQUE:    Axial 2 mm images of the chest and 3 mm images of the abdomen and pelvis were obtained; axial MIP reformatted images of the chest; sagittal and coronal reformatted images of the chest, abdomen and pelvis.   IV contrast:  Omnipaque 300 100 mL.   Oral contrast:  None.  FINDINGS: MEDIASTINUM AND HILAR REGIONS    Lymph Nodes:  There is no mediastinal or hilar lymphadenopathy.    Central airways: Patent.    Esophagus: No dilatation, wall thickening or mass is seen.    Thoracic aorta: Normal caliber.  There are atherosclerotic calcifications of the thoracic aorta.    Heart: Normal in size. No valvular calcifications.     Coronary arteries: Coronary artery calcifications are seen.    Pericardium: Normal. No effusion, thickening or calcification.       Pulmonary arteries: Normal caliber. LUNGS AND PLEURA    Lungs: 6 mm mean axial dimension pulmonary nodule in the left lower lobe (series 3, image 88). Mild dependent atelectasis in the left lower lobe. Otherwise unremarkable.    Pleura: No pleural effusion, thickening or calcification. MISCELLANEOUS    Support tubes and lines:  None.    Base of neck/thyroid:  Unremarkable.    Axillae: Enlarged left axillary lymph nodes measuring up to 1.6 cm in short axis dimension. No right axillary lymphadenopathy. UPPER ABDOMEN    Liver:  Normal.    Gallbladder and biliary tree: Calcified gallstones and partially calcified gallbladder wall. No biliary dilatation.    Pancreas:  Normal.    Spleen:  Normal.    Lymph nodes:  No upper abdominal lymphadenopathy. RETROPERITONEUM    Adrenals:  Normal.    Kidneys and ureters:  Normal.    Lymph nodes:  No para-aortic lymphadenopathy. GASTROINTESTINAL TRACT, MESENTERY AND PERITONEUM    GI tract:  The stomach is within normal limits. The small and large bowel are normal in caliber and wall thickness. No colonic diverticulosis. The appendix is normal.    Mesentery: No abdominal mesenteric fat stranding or lymphadenopathy.    Free air or fluid:  None.  VASCULATURE    The abdominal aorta is normal in caliber. There are atherosclerotic calcifications of the abdominal aorta and its branches. PELVIS    The bladder is not well distended but there appears to be moderate anterior bladder wall thickening. A couple of small calcified uterine fibroids are seen. No pelvic lymphadenopathy or free fluid. BONES/SOFT TISSUES    Degenerative changes are seen in the spine. Grade 1 anterolisthesis of L3 on L4 and L4 on L5, likely related to degenerative changes. No suspicious osseous lesion is seen. The left breast is not entirely included in the field-of-view. There is a mass  occupying the majority of the visualized left breast measuring 8 x 13 cm in greatest axial dimensions in the visualized portion. Subcutaneous fat stranding in the caudal aspect of the breast with overlying skin thickening. Subcutaneous fat stranding in the left flank.     Impression: IMPRESSION: Large left breast mass compatible with breast cancer with metastatic left axillary lymphadenopathy. 6 mm left lower lobe pulmonary nodule is indeterminate. Attention on follow-up is recommended. The bladder is not well distended but there is apparent moderate anterior bladder wall thickening, nonspecific, could be related to cystitis. Clinical correlation is recommended. Cholelithiasis and porcelain gallbladder. Electronically Signed by: Eulalio Mireles MD Signed on: 8/10/2024 1:00 AM Created on Workstation ID: [id number] Signed on Workstation ID: [id number]      Time involved on discharge summary: 35 minutes    Tad Wu MD  Hospitalist    Discharge summary routed to: Primary care physician   Patient information on fall and injury prevention

## 2024-08-16 ENCOUNTER — EMERGENCY (EMERGENCY)
Facility: HOSPITAL | Age: 74
LOS: 1 days | Discharge: HOME | End: 2024-08-16
Attending: EMERGENCY MEDICINE
Payer: MEDICARE

## 2024-08-16 VITALS
OXYGEN SATURATION: 95 % | WEIGHT: 273.37 LBS | HEART RATE: 79 BPM | RESPIRATION RATE: 18 BRPM | DIASTOLIC BLOOD PRESSURE: 70 MMHG | SYSTOLIC BLOOD PRESSURE: 120 MMHG | TEMPERATURE: 98 F | HEIGHT: 71 IN

## 2024-08-16 DIAGNOSIS — Z98.890 OTHER SPECIFIED POSTPROCEDURAL STATES: Chronic | ICD-10-CM

## 2024-08-16 DIAGNOSIS — Z95.828 PRESENCE OF OTHER VASCULAR IMPLANTS AND GRAFTS: Chronic | ICD-10-CM

## 2024-08-16 LAB
ALBUMIN SERPL ELPH-MCNC: 3.7 G/DL — SIGNIFICANT CHANGE UP (ref 3.3–5.2)
ALP SERPL-CCNC: 71 U/L — SIGNIFICANT CHANGE UP (ref 40–120)
ALT FLD-CCNC: 37 U/L — SIGNIFICANT CHANGE UP
ANION GAP SERPL CALC-SCNC: 15 MMOL/L — SIGNIFICANT CHANGE UP (ref 5–17)
ANISOCYTOSIS BLD QL: SLIGHT — SIGNIFICANT CHANGE UP
APPEARANCE UR: ABNORMAL
AST SERPL-CCNC: 22 U/L — SIGNIFICANT CHANGE UP
BACTERIA # UR AUTO: NEGATIVE /HPF — SIGNIFICANT CHANGE UP
BASOPHILS # BLD AUTO: 0.05 K/UL — SIGNIFICANT CHANGE UP (ref 0–0.2)
BASOPHILS NFR BLD AUTO: 0.9 % — SIGNIFICANT CHANGE UP (ref 0–2)
BILIRUB SERPL-MCNC: 0.4 MG/DL — SIGNIFICANT CHANGE UP (ref 0.4–2)
BILIRUB UR-MCNC: NEGATIVE — SIGNIFICANT CHANGE UP
BUN SERPL-MCNC: 16.2 MG/DL — SIGNIFICANT CHANGE UP (ref 8–20)
BURR CELLS BLD QL SMEAR: PRESENT — SIGNIFICANT CHANGE UP
CALCIUM SERPL-MCNC: 8.9 MG/DL — SIGNIFICANT CHANGE UP (ref 8.4–10.5)
CAST: 0 /LPF — SIGNIFICANT CHANGE UP (ref 0–4)
CHLORIDE SERPL-SCNC: 101 MMOL/L — SIGNIFICANT CHANGE UP (ref 96–108)
CO2 SERPL-SCNC: 20 MMOL/L — LOW (ref 22–29)
COLOR SPEC: YELLOW — SIGNIFICANT CHANGE UP
CREAT SERPL-MCNC: 1.38 MG/DL — HIGH (ref 0.5–1.3)
DIFF PNL FLD: NEGATIVE — SIGNIFICANT CHANGE UP
EGFR: 54 ML/MIN/1.73M2 — LOW
EOSINOPHIL # BLD AUTO: 0.05 K/UL — SIGNIFICANT CHANGE UP (ref 0–0.5)
EOSINOPHIL NFR BLD AUTO: 0.9 % — SIGNIFICANT CHANGE UP (ref 0–6)
GLUCOSE SERPL-MCNC: 210 MG/DL — HIGH (ref 70–99)
GLUCOSE UR QL: NEGATIVE MG/DL — SIGNIFICANT CHANGE UP
HCT VFR BLD CALC: 49.3 % — SIGNIFICANT CHANGE UP (ref 39–50)
HGB BLD-MCNC: 16.4 G/DL — SIGNIFICANT CHANGE UP (ref 13–17)
KETONES UR-MCNC: NEGATIVE MG/DL — SIGNIFICANT CHANGE UP
LEUKOCYTE ESTERASE UR-ACNC: NEGATIVE — SIGNIFICANT CHANGE UP
LYMPHOCYTES # BLD AUTO: 0.81 K/UL — LOW (ref 1–3.3)
LYMPHOCYTES # BLD AUTO: 13.4 % — SIGNIFICANT CHANGE UP (ref 13–44)
MAGNESIUM SERPL-MCNC: 1.9 MG/DL — SIGNIFICANT CHANGE UP (ref 1.6–2.6)
MANUAL SMEAR VERIFICATION: SIGNIFICANT CHANGE UP
MCHC RBC-ENTMCNC: 32.4 PG — SIGNIFICANT CHANGE UP (ref 27–34)
MCHC RBC-ENTMCNC: 33.3 GM/DL — SIGNIFICANT CHANGE UP (ref 32–36)
MCV RBC AUTO: 97.4 FL — SIGNIFICANT CHANGE UP (ref 80–100)
MONOCYTES # BLD AUTO: 0.49 K/UL — SIGNIFICANT CHANGE UP (ref 0–0.9)
MONOCYTES NFR BLD AUTO: 8 % — SIGNIFICANT CHANGE UP (ref 2–14)
NEUTROPHILS # BLD AUTO: 4.33 K/UL — SIGNIFICANT CHANGE UP (ref 1.8–7.4)
NEUTROPHILS NFR BLD AUTO: 71.4 % — SIGNIFICANT CHANGE UP (ref 43–77)
NITRITE UR-MCNC: NEGATIVE — SIGNIFICANT CHANGE UP
PH UR: 7 — SIGNIFICANT CHANGE UP (ref 5–8)
PHOSPHATE SERPL-MCNC: 1.8 MG/DL — LOW (ref 2.4–4.7)
PLAT MORPH BLD: NORMAL — SIGNIFICANT CHANGE UP
PLATELET # BLD AUTO: 125 K/UL — LOW (ref 150–400)
POIKILOCYTOSIS BLD QL AUTO: SIGNIFICANT CHANGE UP
POLYCHROMASIA BLD QL SMEAR: SLIGHT — SIGNIFICANT CHANGE UP
POTASSIUM SERPL-MCNC: 4.7 MMOL/L — SIGNIFICANT CHANGE UP (ref 3.5–5.3)
POTASSIUM SERPL-SCNC: 4.7 MMOL/L — SIGNIFICANT CHANGE UP (ref 3.5–5.3)
PROT SERPL-MCNC: 6.3 G/DL — LOW (ref 6.6–8.7)
PROT UR-MCNC: 30 MG/DL
RBC # BLD: 5.06 M/UL — SIGNIFICANT CHANGE UP (ref 4.2–5.8)
RBC # FLD: 14.1 % — SIGNIFICANT CHANGE UP (ref 10.3–14.5)
RBC BLD AUTO: ABNORMAL
RBC CASTS # UR COMP ASSIST: 0 /HPF — SIGNIFICANT CHANGE UP (ref 0–4)
SMUDGE CELLS # BLD: PRESENT — SIGNIFICANT CHANGE UP
SODIUM SERPL-SCNC: 136 MMOL/L — SIGNIFICANT CHANGE UP (ref 135–145)
SP GR SPEC: 1.01 — SIGNIFICANT CHANGE UP (ref 1–1.03)
SQUAMOUS # UR AUTO: 0 /HPF — SIGNIFICANT CHANGE UP (ref 0–5)
UROBILINOGEN FLD QL: 0.2 MG/DL — SIGNIFICANT CHANGE UP (ref 0.2–1)
VARIANT LYMPHS # BLD: 5.4 % — SIGNIFICANT CHANGE UP (ref 0–6)
WBC # BLD: 6.07 K/UL — SIGNIFICANT CHANGE UP (ref 3.8–10.5)
WBC # FLD AUTO: 6.07 K/UL — SIGNIFICANT CHANGE UP (ref 3.8–10.5)
WBC UR QL: 0 /HPF — SIGNIFICANT CHANGE UP (ref 0–5)

## 2024-08-16 PROCEDURE — 93010 ELECTROCARDIOGRAM REPORT: CPT

## 2024-08-16 PROCEDURE — 99285 EMERGENCY DEPT VISIT HI MDM: CPT

## 2024-08-16 PROCEDURE — 71275 CT ANGIOGRAPHY CHEST: CPT | Mod: 26,MC

## 2024-08-16 PROCEDURE — 70450 CT HEAD/BRAIN W/O DYE: CPT | Mod: 26,MC

## 2024-08-16 RX ORDER — ATORVASTATIN CALCIUM 40 MG/1
40 TABLET, FILM COATED ORAL ONCE
Refills: 0 | Status: COMPLETED | OUTPATIENT
Start: 2024-08-16 | End: 2024-08-16

## 2024-08-16 RX ORDER — DESMOPRESSIN ACETATE 0.2 MG/1
0.5 TABLET ORAL ONCE
Refills: 0 | Status: ACTIVE | OUTPATIENT
Start: 2024-08-16 | End: 2024-08-16

## 2024-08-16 RX ORDER — TAMSULOSIN HCL 0.4 MG
0.8 CAPSULE ORAL ONCE
Refills: 0 | Status: COMPLETED | OUTPATIENT
Start: 2024-08-16 | End: 2024-08-16

## 2024-08-16 RX ORDER — DIVALPROEX SODIUM 125 MG/1
500 CAPSULE, COATED PELLETS ORAL ONCE
Refills: 0 | Status: COMPLETED | OUTPATIENT
Start: 2024-08-16 | End: 2024-08-16

## 2024-08-16 RX ORDER — HYDROCORTISONE ACETATE
10 POWDER (GRAM) MISCELLANEOUS ONCE
Refills: 0 | Status: COMPLETED | OUTPATIENT
Start: 2024-08-16 | End: 2024-08-16

## 2024-08-16 RX ORDER — METOPROLOL TARTRATE 100 MG
25 TABLET ORAL ONCE
Refills: 0 | Status: COMPLETED | OUTPATIENT
Start: 2024-08-16 | End: 2024-08-16

## 2024-08-16 RX ORDER — TOPIRAMATE 100 MG/1
100 TABLET ORAL ONCE
Refills: 0 | Status: COMPLETED | OUTPATIENT
Start: 2024-08-16 | End: 2024-08-16

## 2024-08-16 RX ORDER — RIVAROXABAN 15 MG-20MG
20 KIT ORAL ONCE
Refills: 0 | Status: COMPLETED | OUTPATIENT
Start: 2024-08-16 | End: 2024-08-16

## 2024-08-16 RX ADMIN — Medication 25 MILLIGRAM(S): at 21:53

## 2024-08-16 RX ADMIN — DIVALPROEX SODIUM 500 MILLIGRAM(S): 125 CAPSULE, COATED PELLETS ORAL at 21:53

## 2024-08-16 RX ADMIN — Medication 10 MILLIGRAM(S): at 21:52

## 2024-08-16 RX ADMIN — RIVAROXABAN 20 MILLIGRAM(S): KIT at 21:53

## 2024-08-16 RX ADMIN — ATORVASTATIN CALCIUM 40 MILLIGRAM(S): 40 TABLET, FILM COATED ORAL at 21:53

## 2024-08-16 RX ADMIN — Medication 0.8 MILLIGRAM(S): at 21:53

## 2024-08-16 RX ADMIN — Medication 500 MILLIGRAM(S): at 21:53

## 2024-08-16 RX ADMIN — TOPIRAMATE 100 MILLIGRAM(S): 100 TABLET ORAL at 21:53

## 2024-08-16 NOTE — ED PROVIDER NOTE - CARE PROVIDER_API CALL
Tim Loyola  Pulmonary Disease  [address], [address]  [place] [address]  Phone: [phone]  Fax: [phone]  Follow Up Time: Urgent    Oj Aaron  Neurology  [address], [address]  [place] [address]  Phone: [phone]  Fax: [phone]  Follow Up Time: Urgent

## 2024-08-16 NOTE — ED PROVIDER NOTE - PROVIDER TOKENS
PROVIDER:[TOKEN:[[id number]],FOLLOWUP:[Urgent]],PROVIDER:[TOKEN:[[id number]],FOLLOWUP:[Urgent]]

## 2024-08-16 NOTE — ED ADULT NURSE NOTE - NSFALLUNIVINTERV_ED_ALL_ED
Bed/Stretcher in lowest position, wheels locked, appropriate side rails in place/Call bell, personal items and telephone in reach/Instruct patient to call for assistance before getting out of bed/chair/stretcher/Non-slip footwear applied when patient is off stretcher/[place] to call system/Physically safe environment - no spills, clutter or unnecessary equipment/Purposeful proactive rounding/Room/bathroom lighting operational, light cord in reach

## 2024-08-16 NOTE — ED PROVIDER NOTE - NSFOLLOWUPINSTRUCTIONS_ED_ALL_ED_FT
1. Follow up with your primary care physician within 2-3days for reevaluation.  2.  Return to the Emergency Department immediately for any worsening, progressive or concerning symptoms.   3. Follow up with pulmonology next week for further evaluation.  4. Follow up with neurology next week for further evaluation.

## 2024-08-16 NOTE — ED PROVIDER NOTE - OBJECTIVE STATEMENT
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 Patient states that at first patient began to have extreme nightmares, acting out in bed, but started to get confused about the name of the dog, forgetting the phone number, disoriented, and flustered. New headache over the last 2 weeks. No fever, no cough, no nausea, no vomiting, no new rashes, no feeling of being too cold, no feeling of being to hot.

## 2024-08-16 NOTE — ED PROVIDER NOTE - PATIENT PORTAL LINK FT
You can access the FollowMyHealth Patient Portal offered by [facility] by registering at the following website: [email or web address]. By joining [facility]’s FollowMyHealth portal, you will also be able to view your health information using other applications (apps) compatible with our system.

## 2024-08-16 NOTE — ED ADULT NURSE NOTE - OBJECTIVE STATEMENT
Pt A&Ox3, resp wnl. Pt presents with 2 weeks of trouble sleeping, AMS, dizziness, lightheadedness. Pt states that he has been forgetting things throughout the day and has felt not like himself per pt and pts wife. 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 Pt denies CP, SOB, syncope, numbness/tingling, abd. pain, urinary symptoms, bloody stools, falls.

## 2024-08-16 NOTE — ED PROVIDER NOTE - PHYSICAL EXAMINATION
Gen: well appearing, no acute distress  Head: normocephalic, atraumatic  EENT: EOMI, moist mucous membranes, no scleral icterus, no JVD  Lung: no increased work of breathing, clear to auscultation bilaterally, no wheezing, rales, rhonchi, speaking in full sentences  CV: regular rate, regular rhythm, normal s1/s2, 2+ radial pulses bilaterally  Abd: soft, non-tender, non-distended,  no CVA tenderness  MSK: No pitting edema, no visible deformities, full range of motion in all 4 extremities  Neuro: Awake, alert, no focal neurologic deficits, muscle strength 5/5 in upper and lower extremities, some ataxia  Skin: No obvious rash, no jaundice  Psych: normal affect, normal speech Gen: well appearing, no acute distress  Head: normocephalic, atraumatic  EENT: EOMI, moist mucous membranes, no scleral icterus, no JVD  Lung: no increased work of breathing, clear to auscultation bilaterally, no wheezing, rales, rhonchi, speaking in full sentences  CV: regular rate, regular rhythm, normal s1/s2, 2+ radial pulses bilaterally  Abd: soft, non-tender, non-distended,  no CVA tenderness  MSK: No pitting edema, no visible deformities, full range of motion in all 4 extremities  Neuro: Awake, alert, no focal neurologic deficits, muscle strength 5/5 in upper and lower extremities, slight tremor  Skin: No obvious rash, no jaundice  Psych: normal affect, normal speech

## 2024-08-16 NOTE — ED PROVIDER NOTE - NS ED ROS FT
Gen: No fever, no change in activity level  ENT: No congestion, no rhinorrhea  Resp: No cough, no trouble breathing  Cardiovascular: No chest pain, no palpitation  Gastrointestinal: No nausea, no vomiting, no diarrhea  :  No change in urine output; no dysuria, no hematuria  MS: No joint or muscle pain  Skin: No rashes  Neuro: +headache; no abnormal movements  Remainder negative, except as per the HPI

## 2024-08-16 NOTE — ED PROVIDER NOTE - CARE PROVIDERS DIRECT ADDRESSES
,DirectAddress_Unknown,[email or web address]

## 2024-08-16 NOTE — ED ADULT TRIAGE NOTE - CHIEF COMPLAINT QUOTE
wife states  having confusion x a few weeks on & off, c/o headache  A&Ox3, resp wnl, states he feels nervous, feels foggy

## 2024-08-16 NOTE — ED PROVIDER NOTE - CLINICAL SUMMARY MEDICAL DECISION MAKING FREE TEXT BOX
patient has acute signs of AMS, and a history of Craniopharyngioma, and hypothyroid will order CBC, CMP, Thyroid test, and CT head noncontrast patient has acute signs of AMS, and a history of Craniopharyngioma, and hypothyroid will order CBC, CMP, Thyroid test, and CT head noncontrast    Patient signed out to me by Dr. Suarez.  Patient CT imaging shows no acute findings, CT chest shows no pulmonary embolism, but does show nonspecific groundglass opacities.  Patient has no cough, elevated white blood cell count, fevers or chills, unlikely infectious etiology at this time.  Patient does have a pulmonologist, advised to follow-up with him.  The remainder have his labs and imaging were reviewed and were nonactionable.  Patient stable for DC home.

## 2024-08-16 NOTE — ED PROVIDER NOTE - NSICDXPASTMEDICALHX_GEN_ALL_CORE_FT
PAST MEDICAL HISTORY:  Brain tumor     Diabetes insipidus (pt denies)    DM (diabetes mellitus)     DVT (deep venous thrombosis)     HLD (hyperlipidemia)     Hypothyroid     Intractable tension-type headache     HTOMAS (obstructive sleep apnea)     PE (pulmonary thromboembolism)

## 2024-08-16 NOTE — ED PROVIDER NOTE - ATTENDING CONTRIBUTION TO CARE
Daniela: I performed a face to face evaluation of this patient and performed a full history and physical examination on the patient.  I agree with the resident's history, physical examination, and plan of the patient unless otherwise noted. My brief assessment is as follows: 75 yo M PMHx craniopharyngioma, T2DM, diabetes insipidus, hypothyroid, PE on a/c p/w 2 weeks of confusion. having nightmares, disorientation, forgetful and generalized being off. sometimes feels off balance though has been able to walk. no vomiting, no f/c. no vision changes, has had some hedache that improves with tylenol. non toxic, nad, satting 90% with good wave form frequently but then goes up to 95%. nl strength/sensation. slight tremor b/l finger to nose without ataxia. labs, ct, urine, cxr.

## 2024-08-17 PROCEDURE — 93005 ELECTROCARDIOGRAM TRACING: CPT

## 2024-08-17 PROCEDURE — 70450 CT HEAD/BRAIN W/O DYE: CPT | Mod: MC

## 2024-08-17 PROCEDURE — 84436 ASSAY OF TOTAL THYROXINE: CPT

## 2024-08-17 PROCEDURE — 84480 ASSAY TRIIODOTHYRONINE (T3): CPT

## 2024-08-17 PROCEDURE — 83735 ASSAY OF MAGNESIUM: CPT

## 2024-08-17 PROCEDURE — 85025 COMPLETE CBC W/AUTO DIFF WBC: CPT

## 2024-08-17 PROCEDURE — 87186 SC STD MICRODIL/AGAR DIL: CPT

## 2024-08-17 PROCEDURE — 36415 COLL VENOUS BLD VENIPUNCTURE: CPT

## 2024-08-17 PROCEDURE — 99285 EMERGENCY DEPT VISIT HI MDM: CPT | Mod: 25

## 2024-08-17 PROCEDURE — 84100 ASSAY OF PHOSPHORUS: CPT

## 2024-08-17 PROCEDURE — 71275 CT ANGIOGRAPHY CHEST: CPT | Mod: MC

## 2024-08-17 PROCEDURE — 80053 COMPREHEN METABOLIC PANEL: CPT

## 2024-08-17 PROCEDURE — 81001 URINALYSIS AUTO W/SCOPE: CPT

## 2024-08-17 PROCEDURE — 87077 CULTURE AEROBIC IDENTIFY: CPT

## 2024-08-17 PROCEDURE — 84443 ASSAY THYROID STIM HORMONE: CPT

## 2024-08-17 PROCEDURE — 87086 URINE CULTURE/COLONY COUNT: CPT

## 2024-08-20 LAB
-  AMOXICILLIN/CLAVULANIC ACID: SIGNIFICANT CHANGE UP
-  AMPICILLIN/SULBACTAM: SIGNIFICANT CHANGE UP
-  AMPICILLIN: SIGNIFICANT CHANGE UP
-  AZTREONAM: SIGNIFICANT CHANGE UP
-  CEFAZOLIN: SIGNIFICANT CHANGE UP
-  CEFEPIME: SIGNIFICANT CHANGE UP
-  CEFOXITIN: SIGNIFICANT CHANGE UP
-  CEFTRIAXONE: SIGNIFICANT CHANGE UP
-  CIPROFLOXACIN: SIGNIFICANT CHANGE UP
-  ERTAPENEM: SIGNIFICANT CHANGE UP
-  GENTAMICIN: SIGNIFICANT CHANGE UP
-  IMIPENEM: SIGNIFICANT CHANGE UP
-  LEVOFLOXACIN: SIGNIFICANT CHANGE UP
-  MEROPENEM: SIGNIFICANT CHANGE UP
-  NITROFURANTOIN: SIGNIFICANT CHANGE UP
-  PIPERACILLIN/TAZOBACTAM: SIGNIFICANT CHANGE UP
-  TOBRAMYCIN: SIGNIFICANT CHANGE UP
-  TRIMETHOPRIM/SULFAMETHOXAZOLE: SIGNIFICANT CHANGE UP
CULTURE RESULTS: ABNORMAL
METHOD TYPE: SIGNIFICANT CHANGE UP
ORGANISM # SPEC MICROSCOPIC CNT: ABNORMAL
ORGANISM # SPEC MICROSCOPIC CNT: SIGNIFICANT CHANGE UP
SPECIMEN SOURCE: SIGNIFICANT CHANGE UP

## 2024-09-04 ENCOUNTER — OFFICE (OUTPATIENT)
Dept: URBAN - METROPOLITAN AREA CLINIC 104 | Facility: CLINIC | Age: 74
Setting detail: OPHTHALMOLOGY
End: 2024-09-04
Payer: MEDICARE

## 2024-09-04 DIAGNOSIS — H01.002: ICD-10-CM

## 2024-09-04 DIAGNOSIS — H16.223: ICD-10-CM

## 2024-09-04 DIAGNOSIS — H01.004: ICD-10-CM

## 2024-09-04 DIAGNOSIS — H01.001: ICD-10-CM

## 2024-09-04 DIAGNOSIS — H01.005: ICD-10-CM

## 2024-09-04 PROCEDURE — 99214 OFFICE O/P EST MOD 30 MIN: CPT | Mod: 25 | Performed by: OPTOMETRIST

## 2024-09-04 PROCEDURE — 68761 CLOSE TEAR DUCT OPENING: CPT | Mod: 50 | Performed by: OPTOMETRIST

## 2024-09-04 ASSESSMENT — LID EXAM ASSESSMENTS
OD_BLEPHARITIS: RLL RUL 2+ 3+
OS_BLEPHARITIS: LLL LUL 2+ 3+

## 2024-09-08 ENCOUNTER — NON-APPOINTMENT (OUTPATIENT)
Age: 74
End: 2024-09-08

## 2024-09-09 ENCOUNTER — APPOINTMENT (OUTPATIENT)
Dept: NEUROLOGY | Facility: CLINIC | Age: 74
End: 2024-09-09
Payer: MEDICARE

## 2024-09-09 VITALS
TEMPERATURE: 98.2 F | WEIGHT: 265 LBS | BODY MASS INDEX: 37.1 KG/M2 | HEART RATE: 80 BPM | HEIGHT: 71 IN | DIASTOLIC BLOOD PRESSURE: 66 MMHG | SYSTOLIC BLOOD PRESSURE: 105 MMHG

## 2024-09-09 DIAGNOSIS — R41.0 DISORIENTATION, UNSPECIFIED: ICD-10-CM

## 2024-09-09 DIAGNOSIS — F09 UNSPECIFIED MENTAL DISORDER DUE TO KNOWN PHYSIOLOGICAL CONDITION: ICD-10-CM

## 2024-09-09 DIAGNOSIS — R56.9 UNSPECIFIED CONVULSIONS: ICD-10-CM

## 2024-09-09 PROCEDURE — 99214 OFFICE O/P EST MOD 30 MIN: CPT

## 2024-09-09 PROCEDURE — G2211 COMPLEX E/M VISIT ADD ON: CPT

## 2024-09-09 NOTE — REVIEW OF SYSTEMS
[Feeling Tired] : feeling tired [As Noted in HPI] : as noted in HPI [Memory Lapses or Loss] : memory loss [Decr. Concentrating Ability] : decreased concentrating ability [Tension Headache] : tension-type headaches [Negative] : Heme/Lymph [Sleep Disturbances] : sleep disturbances [Anxiety] : anxiety [Confused or Disoriented] : confusion [Poor Coordination] : good coordination [Seizures] : no convulsions [Migraine Headache] : no migraine headache

## 2024-09-09 NOTE — PHYSICAL EXAM
[General Appearance - Alert] : alert [General Appearance - In No Acute Distress] : in no acute distress [Oriented To Time, Place, And Person] : oriented to person, place, and time [Impaired Insight] : insight and judgment were intact [Affect] : the affect was normal [Person] : oriented to person [Place] : oriented to place [Time] : oriented to time [Registration Intact] : recent registration memory intact [Concentration Intact] : normal concentrating ability [Naming Objects] : no difficulty naming common objects [Repeating Phrases] : no difficulty repeating a phrase [Fluency] : fluency intact [Comprehension] : comprehension intact [Past History] : adequate knowledge of personal past history [Cranial Nerves Optic (II)] : visual acuity intact bilaterally,  visual fields full to confrontation, pupils equal round and reactive to light [Cranial Nerves Oculomotor (III)] : extraocular motion intact [Cranial Nerves Trigeminal (V)] : facial sensation intact symmetrically [Cranial Nerves Facial (VII)] : face symmetrical [Cranial Nerves Vestibulocochlear (VIII)] : hearing was intact bilaterally [Cranial Nerves Glossopharyngeal (IX)] : tongue and palate midline [Cranial Nerves Accessory (XI - Cranial And Spinal)] : head turning and shoulder shrug symmetric [Cranial Nerves Hypoglossal (XII)] : there was no tongue deviation with protrusion [Motor Tone] : muscle tone was normal in all four extremities [Motor Strength] : muscle strength was normal in all four extremities [No Muscle Atrophy] : normal bulk in all four extremities [Sensation Tactile Decrease] : light touch was intact [Limited Balance] : the patient's balance was impaired [Tremor] : a tremor present [2+] : Patella left 2+ [1+] : Ankle jerk right 1+ [PERRL With Normal Accommodation] : pupils were equal in size, round, reactive to light, with normal accommodation [Extraocular Movements] : extraocular movements were intact [Full Visual Field] : full visual field [] : the neck was supple [Neck Cervical Mass (___cm)] : no neck mass was observed [Short Term Intact] : short term memory impaired [Past-pointing] : there was no past-pointing [Dysdiadochokinesia Bilaterally] : not present [Coordination - Dysmetria Impaired Finger-to-Nose Bilateral] : not present [Plantar Reflex Right Only] : normal on the right [Plantar Reflex Left Only] : normal on the left [FreeTextEntry6] : Left foot in splint [Papilledema Of Both Eyes] : no papilledema

## 2024-09-09 NOTE — DATA REVIEWED
[de-identified] : 12/4/23: MRI brain - no SDH, hydrocephalus, prior frontotemporal craniotomy without significant change.  [de-identified] : 2/24/21: EEG shows focal slowing in the right temporal and parietal region superimposed on generalized slowing, Potentially structural in nature, no epileptiform discharges or seizures.. [de-identified] : 8/16/24: CT head: No acute findings, MVI Changes, Postsurgical changes 12/3/23: CT scan head: right pterional craniotomy with adjacent hypodensity unchanged. 2/10/20: CT head: Status post right craniotomy with underlying due to of thickening without significant change from prior study, no evidence of acute intracranial hemorrhage
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
## 2024-09-09 NOTE — HISTORY OF PRESENT ILLNESS
[FreeTextEntry1] : Pt is here for a follow-up visit today, last seen on 7/1/24, patient is accompanied by his wife. Pt and his wife report that in mid August he was noted to be confused, had memory lapses, was acting out and had nightmares, apparently forgot his dog's name, when asked regarding going to 's, he wanted know what it was although he has been there numerous times, his symptoms had been ongoing for about 2 weeks, they took him to [facility], CT scan of the head done was unremarkable, For any acute findings, CT chest showed some groundglass changes.  He was discharged home with advised to follow-up with neurology and pulmonology.  Patient's wife reports that he is almost back to his baseline now, he continues to take Depakote 500 Mg at bedtime and topiramate 100 Mg twice daily.  In addition his PMD has started him on low-dose Adderall as he was feeling dull / sleepy in addition to venlafaxine  Mg daily.  Patient has not had an active seizure in a long time.  Patient reports headaches are well-controlled, Ajovy injections were discontinued at the last visit

## 2024-09-11 ENCOUNTER — APPOINTMENT (OUTPATIENT)
Dept: NEUROLOGY | Facility: CLINIC | Age: 74
End: 2024-09-11
Payer: MEDICARE

## 2024-09-11 PROCEDURE — 95816 EEG AWAKE AND DROWSY: CPT

## 2024-09-12 ENCOUNTER — NON-APPOINTMENT (OUTPATIENT)
Age: 74
End: 2024-09-12

## 2024-09-12 ENCOUNTER — APPOINTMENT (OUTPATIENT)
Dept: NEUROLOGY | Facility: CLINIC | Age: 74
End: 2024-09-12
Payer: MEDICARE

## 2024-09-12 PROCEDURE — 95708 EEG WO VID EA 12-26HR UNMNTR: CPT

## 2024-09-12 PROCEDURE — 95700 EEG CONT REC W/VID EEG TECH: CPT

## 2024-09-12 PROCEDURE — 95719 EEG PHYS/QHP EA INCR W/O VID: CPT

## 2024-09-18 ENCOUNTER — APPOINTMENT (OUTPATIENT)
Dept: MRI IMAGING | Facility: CLINIC | Age: 74
End: 2024-09-18

## 2024-09-18 ENCOUNTER — OUTPATIENT (OUTPATIENT)
Dept: OUTPATIENT SERVICES | Facility: HOSPITAL | Age: 74
LOS: 1 days | End: 2024-09-18
Payer: MEDICARE

## 2024-09-18 DIAGNOSIS — Z95.828 PRESENCE OF OTHER VASCULAR IMPLANTS AND GRAFTS: Chronic | ICD-10-CM

## 2024-09-18 DIAGNOSIS — R41.0 DISORIENTATION, UNSPECIFIED: ICD-10-CM

## 2024-09-18 DIAGNOSIS — Z98.890 OTHER SPECIFIED POSTPROCEDURAL STATES: Chronic | ICD-10-CM

## 2024-09-18 PROCEDURE — 70551 MRI BRAIN STEM W/O DYE: CPT | Mod: 26

## 2024-09-20 ENCOUNTER — APPOINTMENT (OUTPATIENT)
Dept: PULMONOLOGY | Facility: CLINIC | Age: 74
End: 2024-09-20
Payer: MEDICARE

## 2024-09-20 VITALS
RESPIRATION RATE: 17 BRPM | HEART RATE: 92 BPM | DIASTOLIC BLOOD PRESSURE: 70 MMHG | OXYGEN SATURATION: 91 % | SYSTOLIC BLOOD PRESSURE: 130 MMHG | HEIGHT: 71 IN | BODY MASS INDEX: 37.8 KG/M2 | WEIGHT: 270 LBS

## 2024-09-20 DIAGNOSIS — F51.5 NIGHTMARE DISORDER: ICD-10-CM

## 2024-09-20 DIAGNOSIS — G47.33 OBSTRUCTIVE SLEEP APNEA (ADULT) (PEDIATRIC): ICD-10-CM

## 2024-09-20 PROCEDURE — 99203 OFFICE O/P NEW LOW 30 MIN: CPT

## 2024-09-20 NOTE — PHYSICAL EXAM
[No Acute Distress] : no acute distress [Elongated Uvula] : elongated uvula [Enlarged Base of the Tongue] : enlarged base of the tongue [III] : Mallampati Class: III [Normal Appearance] : normal appearance [Neck Circumference: ___] : neck circumference: [unfilled] [No Neck Mass] : no neck mass [Normal Rate/Rhythm] : normal rate/rhythm [Normal S1, S2] : normal s1, s2 [No Resp Distress] : no resp distress [Clear to Auscultation Bilaterally] : clear to auscultation bilaterally [No Clubbing] : no clubbing [No Cyanosis] : no cyanosis

## 2024-09-20 NOTE — HISTORY OF PRESENT ILLNESS
[TextBox_4] : 9/20/24  74M ED 8/16/24 with confusion X 2 weeks. Nightmares  Obesity Migraine DM Hypothyroidism Seizure H/O PE and IVCF GERD-[facility] Never a smoker  Known THOMAS diagnosed many years ago at [facility] Failed CPAP twice Feels he cannot tolerate CPAP Sleeps in a lounge chair BMI=36.96 (5'11" - 265 lbs): 250 lbs would be BMI=35  Craniopharyngioma resected

## 2024-09-20 NOTE — CONSULT LETTER
[Dear  ___] : Dear  [unfilled], [Consult Letter:] : I had the pleasure of evaluating your patient, [unfilled]. [Please see my note below.] : Please see my note below. [Consult Closing:] : Thank you very much for allowing me to participate in the care of this patient.  If you have any questions, please do not hesitate to contact me. [Sincerely,] : Sincerely, [DrRajat  ___] : Dr. HANNA

## 2024-09-24 ENCOUNTER — APPOINTMENT (OUTPATIENT)
Dept: PULMONOLOGY | Facility: CLINIC | Age: 74
End: 2024-09-24
Payer: MEDICARE

## 2024-09-24 DIAGNOSIS — R93.89 ABNORMAL FINDINGS ON DIAGNOSTIC IMAGING OF OTHER SPECIFIED BODY STRUCTURES: ICD-10-CM

## 2024-09-24 DIAGNOSIS — R06.09 OTHER FORMS OF DYSPNEA: ICD-10-CM

## 2024-09-24 PROCEDURE — 99213 OFFICE O/P EST LOW 20 MIN: CPT

## 2024-09-24 PROCEDURE — G2211 COMPLEX E/M VISIT ADD ON: CPT

## 2024-09-24 RX ORDER — PREDNISONE 10 MG/1
10 TABLET ORAL
Qty: 30 | Refills: 1 | Status: ACTIVE | COMMUNITY
Start: 2024-09-24 | End: 1900-01-01

## 2024-09-24 RX ORDER — DOXYCYCLINE 100 MG/1
100 CAPSULE ORAL
Qty: 20 | Refills: 0 | Status: ACTIVE | COMMUNITY
Start: 2024-09-24 | End: 1900-01-01

## 2024-09-24 NOTE — DISCUSSION/SUMMARY
[FreeTextEntry1] : IMP  Obesity THOMAS Inflammatory CT Changes in August, 2024  Plan  PSG Weight loss Doxycycline, Prednisone Taper CT in early November, FU 11/26 - PFT

## 2024-09-24 NOTE — HISTORY OF PRESENT ILLNESS
[Home] : at home, [unfilled] , at the time of the visit. [Medical Office: ([facility])___] : at the medical office located in  [Verbal consent obtained from patient] : the patient, [unfilled] [TextBox_4] : 9/20/24  74M ED 8/16/24 with confusion X 2 weeks. Nightmares  Obesity Migraine DM Hypothyroidism Seizure H/O PE and IVCF GERD-[facility] Never a smoker  Known THOMAS diagnosed many years ago at [facility] Failed CPAP twice Feels he cannot tolerate CPAP Sleeps in a lounge chair BMI=36.96 (5'11" - 265 lbs): 250 lbs would be BMI=35  Craniopharyngioma resected  9/24/24 PORTILLO Had CT 8/16/24 with central peribronchial GGO bilaterally Mild cough

## 2024-10-08 RX ORDER — DEXAMETHASONE 4 MG/1
4 TABLET ORAL
Qty: 5 | Refills: 0 | Status: ACTIVE | COMMUNITY
Start: 2024-10-08 | End: 1900-01-01

## 2024-11-07 ENCOUNTER — OUTPATIENT (OUTPATIENT)
Dept: OUTPATIENT SERVICES | Facility: HOSPITAL | Age: 74
LOS: 1 days | End: 2024-11-07

## 2024-11-07 ENCOUNTER — APPOINTMENT (OUTPATIENT)
Dept: CT IMAGING | Facility: CLINIC | Age: 74
End: 2024-11-07

## 2024-11-07 DIAGNOSIS — R93.89 ABNORMAL FINDINGS ON DIAGNOSTIC IMAGING OF OTHER SPECIFIED BODY STRUCTURES: ICD-10-CM

## 2024-11-07 DIAGNOSIS — Z95.828 PRESENCE OF OTHER VASCULAR IMPLANTS AND GRAFTS: Chronic | ICD-10-CM

## 2024-11-07 DIAGNOSIS — Z98.890 OTHER SPECIFIED POSTPROCEDURAL STATES: Chronic | ICD-10-CM

## 2024-11-07 PROCEDURE — 71250 CT THORAX DX C-: CPT | Mod: 26

## 2024-11-08 ENCOUNTER — APPOINTMENT (OUTPATIENT)
Dept: NEUROLOGY | Facility: CLINIC | Age: 74
End: 2024-11-08
Payer: MEDICARE

## 2024-11-08 ENCOUNTER — NON-APPOINTMENT (OUTPATIENT)
Age: 74
End: 2024-11-08

## 2024-11-08 VITALS
HEART RATE: 84 BPM | TEMPERATURE: 98.2 F | SYSTOLIC BLOOD PRESSURE: 105 MMHG | WEIGHT: 265 LBS | DIASTOLIC BLOOD PRESSURE: 60 MMHG | HEIGHT: 71 IN | BODY MASS INDEX: 37.1 KG/M2

## 2024-11-08 DIAGNOSIS — G31.84 MILD COGNITIVE IMPAIRMENT, SO STATED: ICD-10-CM

## 2024-11-08 DIAGNOSIS — G44.201 TENSION-TYPE HEADACHE, UNSPECIFIED, INTRACTABLE: ICD-10-CM

## 2024-11-08 PROCEDURE — 96132 NRPSYC TST EVAL PHYS/QHP 1ST: CPT | Mod: 59

## 2024-11-08 PROCEDURE — 99213 OFFICE O/P EST LOW 20 MIN: CPT | Mod: 25

## 2024-11-08 PROCEDURE — 96138 PSYCL/NRPSYC TECH 1ST: CPT | Mod: 59

## 2024-11-26 ENCOUNTER — APPOINTMENT (OUTPATIENT)
Dept: PULMONOLOGY | Facility: CLINIC | Age: 74
End: 2024-11-26
Payer: MEDICARE

## 2024-11-26 VITALS — WEIGHT: 263 LBS | HEIGHT: 68.6 IN | BODY MASS INDEX: 39.4 KG/M2

## 2024-11-26 VITALS
RESPIRATION RATE: 16 BRPM | HEART RATE: 88 BPM | SYSTOLIC BLOOD PRESSURE: 120 MMHG | DIASTOLIC BLOOD PRESSURE: 70 MMHG | OXYGEN SATURATION: 93 %

## 2024-11-26 DIAGNOSIS — G47.33 OBSTRUCTIVE SLEEP APNEA (ADULT) (PEDIATRIC): ICD-10-CM

## 2024-11-26 DIAGNOSIS — R06.09 OTHER FORMS OF DYSPNEA: ICD-10-CM

## 2024-11-26 DIAGNOSIS — G43.909 MIGRAINE, UNSPECIFIED, NOT INTRACTABLE, W/OUT STATUS MIGRAINOSUS: ICD-10-CM

## 2024-11-26 DIAGNOSIS — R05.9 COUGH, UNSPECIFIED: ICD-10-CM

## 2024-11-26 DIAGNOSIS — R41.0 DISORIENTATION, UNSPECIFIED: ICD-10-CM

## 2024-11-26 PROCEDURE — 94010 BREATHING CAPACITY TEST: CPT

## 2024-11-26 PROCEDURE — 99213 OFFICE O/P EST LOW 20 MIN: CPT | Mod: 25

## 2024-11-27 PROBLEM — R05.9 COUGH: Status: ACTIVE | Noted: 2024-11-27

## 2024-12-14 ENCOUNTER — OFFICE (OUTPATIENT)
Dept: URBAN - METROPOLITAN AREA CLINIC 104 | Facility: CLINIC | Age: 74
Setting detail: OPHTHALMOLOGY
End: 2024-12-14
Payer: MEDICARE

## 2024-12-14 DIAGNOSIS — H16.222: ICD-10-CM

## 2024-12-14 DIAGNOSIS — H01.005: ICD-10-CM

## 2024-12-14 DIAGNOSIS — Z96.1: ICD-10-CM

## 2024-12-14 DIAGNOSIS — H16.223: ICD-10-CM

## 2024-12-14 DIAGNOSIS — H18.413: ICD-10-CM

## 2024-12-14 DIAGNOSIS — H01.002: ICD-10-CM

## 2024-12-14 DIAGNOSIS — H01.004: ICD-10-CM

## 2024-12-14 DIAGNOSIS — H16.221: ICD-10-CM

## 2024-12-14 DIAGNOSIS — H01.001: ICD-10-CM

## 2024-12-14 PROCEDURE — 99213 OFFICE O/P EST LOW 20 MIN: CPT | Mod: 25 | Performed by: OPTOMETRIST

## 2024-12-14 PROCEDURE — 68761 CLOSE TEAR DUCT OPENING: CPT | Mod: 50 | Performed by: OPTOMETRIST

## 2024-12-14 PROCEDURE — 83861 MICROFLUID ANALY TEARS: CPT | Mod: QW,LT | Performed by: OPTOMETRIST

## 2024-12-14 PROCEDURE — 83861 MICROFLUID ANALY TEARS: CPT | Mod: QW,RT | Performed by: OPTOMETRIST

## 2024-12-14 ASSESSMENT — REFRACTION_AUTOREFRACTION
OS_AXIS: 60
OD_CYLINDER: -0.75
OS_CYLINDER: -0.50
OS_SPHERE: PLANO
OD_SPHERE: +0.50
OD_AXIS: 176

## 2024-12-14 ASSESSMENT — TONOMETRY
OS_IOP_MMHG: 12
OD_IOP_MMHG: 12

## 2024-12-14 ASSESSMENT — SUPERFICIAL PUNCTATE KERATITIS (SPK)
OD_SPK: 1+
OS_SPK: 1+

## 2024-12-14 ASSESSMENT — VISUAL ACUITY
OD_BCVA: 20/20
OS_BCVA: 20/25

## 2024-12-14 ASSESSMENT — PUNCTA - ASSESSMENT
OD_PUNCTA: 3MON PLUG
OS_PUNCTA: 3MON PLUG

## 2024-12-14 ASSESSMENT — LID EXAM ASSESSMENTS
OS_BLEPHARITIS: LLL LUL 2+ 3+
OD_BLEPHARITIS: RLL RUL 2+ 3+

## 2024-12-14 ASSESSMENT — CONFRONTATIONAL VISUAL FIELD TEST (CVF)
OD_FINDINGS: FULL
OS_FINDINGS: FULL

## 2025-03-01 ENCOUNTER — RX ONLY (RX ONLY)
Age: 75
End: 2025-03-01

## 2025-03-01 ENCOUNTER — OFFICE (OUTPATIENT)
Dept: URBAN - METROPOLITAN AREA CLINIC 104 | Facility: CLINIC | Age: 75
Setting detail: OPHTHALMOLOGY
End: 2025-03-01
Payer: MEDICARE

## 2025-03-01 DIAGNOSIS — H16.222: ICD-10-CM

## 2025-03-01 DIAGNOSIS — H01.005: ICD-10-CM

## 2025-03-01 DIAGNOSIS — H16.223: ICD-10-CM

## 2025-03-01 DIAGNOSIS — H16.221: ICD-10-CM

## 2025-03-01 DIAGNOSIS — H01.004: ICD-10-CM

## 2025-03-01 DIAGNOSIS — H01.001: ICD-10-CM

## 2025-03-01 DIAGNOSIS — H01.002: ICD-10-CM

## 2025-03-01 PROCEDURE — 68761 CLOSE TEAR DUCT OPENING: CPT | Mod: 50 | Performed by: OPTOMETRIST

## 2025-03-01 PROCEDURE — 99213 OFFICE O/P EST LOW 20 MIN: CPT | Mod: 25 | Performed by: OPTOMETRIST

## 2025-03-01 PROCEDURE — 83861 MICROFLUID ANALY TEARS: CPT | Mod: QW,LT | Performed by: OPTOMETRIST

## 2025-03-01 PROCEDURE — 83861 MICROFLUID ANALY TEARS: CPT | Mod: QW,RT | Performed by: OPTOMETRIST

## 2025-03-01 ASSESSMENT — REFRACTION_AUTOREFRACTION
OS_AXIS: 60
OD_SPHERE: +0.50
OS_SPHERE: PLANO
OD_AXIS: 176
OD_CYLINDER: -0.75
OS_CYLINDER: -0.50

## 2025-03-01 ASSESSMENT — SUPERFICIAL PUNCTATE KERATITIS (SPK)
OD_SPK: T
OS_SPK: T

## 2025-03-01 ASSESSMENT — CONFRONTATIONAL VISUAL FIELD TEST (CVF)
OS_FINDINGS: FULL
OD_FINDINGS: FULL

## 2025-03-01 ASSESSMENT — VISUAL ACUITY
OS_BCVA: 20/30
OD_BCVA: 20/25+

## 2025-03-01 ASSESSMENT — PUNCTA - ASSESSMENT
OS_PUNCTA: 3MON PLUG
OD_PUNCTA: 3MON PLUG

## 2025-03-01 ASSESSMENT — LID EXAM ASSESSMENTS
OD_BLEPHARITIS: RLL RUL 2+ 3+
OS_BLEPHARITIS: LLL LUL 2+ 3+

## 2025-03-11 ENCOUNTER — RX RENEWAL (OUTPATIENT)
Age: 75
End: 2025-03-11

## 2025-05-12 NOTE — PRE-ANESTHESIA EVALUATION ADULT - HEART RATE (BEATS/MIN)
"Encounter Date: 5/12/2025       History     Chief Complaint   Patient presents with    Sore Throat     Pt stated that she began experiencing sore throat for the past couple days.     Insect Bite     Also complaints of "bite" to right leg with reddening to area.      Presents to ER for c/o sore throat for 2 days with some sinus congestion and cough. No GI symptoms. No SOB. Low grade fever. No ill contacts. Also thinks she got bit by a spider on RLE. Has redness, pain and swelling.     The history is provided by the patient.     Review of patient's allergies indicates:   Allergen Reactions    Tizanidine      Other reaction(s): Nausea    Tramadol Hives    Bactrim [sulfamethoxazole-trimethoprim] Other (See Comments)     "MAKES MY NERVES GO CRAZY"     Past Medical History:   Diagnosis Date    Chronic pain     Depression     Insomnia     Muscle relaxant overdose 07/03/2017    baclofen    Opiate overdose     Osteoarthritis of spine with radiculopathy, lumbar region     Osteopenia     Unilateral primary osteoarthritis, left hip      Past Surgical History:   Procedure Laterality Date    HIP SURGERY Left 2019    replacement    LUMBAR LAMINECTOMY WITH FUSION  07/2012    Kari    PAIN PUMP IMPLANT  2019    SPINE SURGERY  2010    with hardware     Family History   Problem Relation Name Age of Onset    Cancer Mother          pancreas    Diabetes Mother      No Known Problems Father      No Known Problems Sister      No Known Problems Daughter      No Known Problems Maternal Aunt      No Known Problems Maternal Uncle      No Known Problems Paternal Aunt      No Known Problems Paternal Uncle      No Known Problems Maternal Grandmother      No Known Problems Maternal Grandfather      No Known Problems Paternal Grandmother      No Known Problems Paternal Grandfather      No Known Problems Other      Breast cancer Neg Hx      Ovarian cancer Neg Hx      BRCA 1/2 Neg Hx       Social History[1]  Review of Systems   Constitutional:  " Positive for fatigue.   HENT:  Positive for congestion, rhinorrhea and sore throat.    Respiratory:  Positive for cough.    Skin:  Positive for color change and wound.   All other systems reviewed and are negative.      Physical Exam     Initial Vitals [05/12/25 0826]   BP Pulse Resp Temp SpO2   (!) 159/82 102 16 (!) 100.6 °F (38.1 °C) 95 %      MAP       --         Physical Exam    Nursing note and vitals reviewed.  Constitutional: She appears well-developed and well-nourished. She is cooperative.   HENT:   Head: Normocephalic and atraumatic.   Right Ear: External ear normal.   Left Ear: External ear normal. Mouth/Throat: Oropharynx is clear and moist.   Eyes: Conjunctivae are normal.   Neck: Neck supple.   Normal range of motion.  Cardiovascular:  Regular rhythm and intact distal pulses.           Pulmonary/Chest: Breath sounds normal.   Abdominal: Abdomen is soft. Bowel sounds are normal.   Musculoskeletal:         General: Edema (RLE with localized erythema) present. Normal range of motion.      Cervical back: Normal range of motion and neck supple.      Right lower leg: Swelling present. Edema present.        Legs:      Neurological: She is alert and oriented to person, place, and time.   Skin: Capillary refill takes less than 2 seconds. There is erythema.   Psychiatric: Her mood appears anxious. Her speech is rapid and/or pressured. She is hyperactive.         ED Course   Procedures  Labs Reviewed - No data to display       Imaging Results    None          Medications   acetaminophen tablet 650 mg (has no administration in time range)     Medical Decision Making  Sore throat and redness and pain to RLE for 2 days    Differential Dx: Cellulitis, Pharyngitis, Viral syndrome, URI, Sinusitis     Amount and/or Complexity of Data Reviewed  Discussion of management or test interpretation with external provider(s): No swabs needed. Likely viral. Will treat cellulitis. Monitor and wound care. Recheck with PCP in 2 days  for re-evaluation or return to ER for new or worsening issues. Follow-up with pain management for other issues      Risk  OTC drugs.  Prescription drug management.                                      Clinical Impression:  Final diagnoses:  [L03.115] Cellulitis of right leg (Primary)  [J02.9] Viral pharyngitis          ED Disposition Condition    Discharge Stable          ED Prescriptions       Medication Sig Dispense Start Date End Date Auth. Provider    cefdinir (OMNICEF) 300 MG capsule Take 1 capsule (300 mg total) by mouth 2 (two) times daily. for 10 days 20 capsule 5/12/2025 5/22/2025 Wagner Mckinley NP          Follow-up Information       Follow up With Specialties Details Why Contact Info    Christina Andrade MD Internal Medicine In 2 days As needed, If symptoms worsen, For further evaluation, Hospital follow-up [address] [place] [id number]  [phone]                   [1]   Social History  Tobacco Use    Smoking status: Former     Current packs/day: 1.00     Types: Cigarettes     Passive exposure: Past    Smokeless tobacco: Never   Substance Use Topics    Alcohol use: Yes    Drug use: Never        Wagner Mckinley NP  05/12/25 0841     87

## 2025-05-13 ENCOUNTER — APPOINTMENT (OUTPATIENT)
Dept: NEUROLOGY | Facility: CLINIC | Age: 75
End: 2025-05-13
Payer: MEDICARE

## 2025-05-13 VITALS
HEART RATE: 83 BPM | DIASTOLIC BLOOD PRESSURE: 68 MMHG | BODY MASS INDEX: 40.32 KG/M2 | SYSTOLIC BLOOD PRESSURE: 121 MMHG | WEIGHT: 266 LBS | HEIGHT: 68 IN

## 2025-05-13 DIAGNOSIS — G44.201 TENSION-TYPE HEADACHE, UNSPECIFIED, INTRACTABLE: ICD-10-CM

## 2025-05-13 DIAGNOSIS — G31.84 MILD COGNITIVE IMPAIRMENT, SO STATED: ICD-10-CM

## 2025-05-13 DIAGNOSIS — R56.9 UNSPECIFIED CONVULSIONS: ICD-10-CM

## 2025-05-13 PROCEDURE — 99214 OFFICE O/P EST MOD 30 MIN: CPT

## 2025-05-29 ENCOUNTER — EMERGENCY (EMERGENCY)
Facility: HOSPITAL | Age: 75
LOS: 1 days | End: 2025-05-29
Attending: STUDENT IN AN ORGANIZED HEALTH CARE EDUCATION/TRAINING PROGRAM
Payer: MEDICARE

## 2025-05-29 VITALS
HEART RATE: 89 BPM | WEIGHT: 254.41 LBS | RESPIRATION RATE: 18 BRPM | TEMPERATURE: 99 F | OXYGEN SATURATION: 96 % | DIASTOLIC BLOOD PRESSURE: 73 MMHG | HEIGHT: 71 IN | SYSTOLIC BLOOD PRESSURE: 123 MMHG

## 2025-05-29 VITALS
OXYGEN SATURATION: 95 % | RESPIRATION RATE: 18 BRPM | DIASTOLIC BLOOD PRESSURE: 90 MMHG | HEART RATE: 79 BPM | SYSTOLIC BLOOD PRESSURE: 123 MMHG | TEMPERATURE: 97 F

## 2025-05-29 DIAGNOSIS — Z98.890 OTHER SPECIFIED POSTPROCEDURAL STATES: Chronic | ICD-10-CM

## 2025-05-29 DIAGNOSIS — Z95.828 PRESENCE OF OTHER VASCULAR IMPLANTS AND GRAFTS: Chronic | ICD-10-CM

## 2025-05-29 LAB
ALBUMIN SERPL ELPH-MCNC: 3.8 G/DL — SIGNIFICANT CHANGE UP (ref 3.3–5.2)
ALP SERPL-CCNC: 76 U/L — SIGNIFICANT CHANGE UP (ref 40–120)
ALT FLD-CCNC: 46 U/L — HIGH
AMMONIA BLD-MCNC: 17 UMOL/L — SIGNIFICANT CHANGE UP (ref 11–55)
ANION GAP SERPL CALC-SCNC: 11 MMOL/L — SIGNIFICANT CHANGE UP (ref 5–17)
APPEARANCE UR: CLEAR — SIGNIFICANT CHANGE UP
AST SERPL-CCNC: 28 U/L — SIGNIFICANT CHANGE UP
BASOPHILS # BLD AUTO: 0.07 K/UL — SIGNIFICANT CHANGE UP (ref 0–0.2)
BASOPHILS # BLD MANUAL: 0.05 K/UL — SIGNIFICANT CHANGE UP (ref 0–0.2)
BASOPHILS NFR BLD AUTO: 1.3 % — SIGNIFICANT CHANGE UP (ref 0–2)
BASOPHILS NFR BLD MANUAL: 0.9 % — SIGNIFICANT CHANGE UP (ref 0–2)
BILIRUB SERPL-MCNC: 0.3 MG/DL — LOW (ref 0.4–2)
BILIRUB UR-MCNC: NEGATIVE — SIGNIFICANT CHANGE UP
BUN SERPL-MCNC: 14.1 MG/DL — SIGNIFICANT CHANGE UP (ref 8–20)
BURR CELLS BLD QL SMEAR: SLIGHT — SIGNIFICANT CHANGE UP
CALCIUM SERPL-MCNC: 8.8 MG/DL — SIGNIFICANT CHANGE UP (ref 8.4–10.5)
CHLORIDE SERPL-SCNC: 103 MMOL/L — SIGNIFICANT CHANGE UP (ref 96–108)
CO2 SERPL-SCNC: 25 MMOL/L — SIGNIFICANT CHANGE UP (ref 22–29)
COLOR SPEC: YELLOW — SIGNIFICANT CHANGE UP
CREAT ?TM UR-MCNC: 53 MG/DL — SIGNIFICANT CHANGE UP
CREAT SERPL-MCNC: 1.5 MG/DL — HIGH (ref 0.5–1.3)
DIFF PNL FLD: NEGATIVE — SIGNIFICANT CHANGE UP
EGFR: 49 ML/MIN/1.73M2 — LOW
EGFR: 49 ML/MIN/1.73M2 — LOW
EOSINOPHIL # BLD AUTO: 0.2 K/UL — SIGNIFICANT CHANGE UP (ref 0–0.5)
EOSINOPHIL # BLD MANUAL: 0.14 K/UL — SIGNIFICANT CHANGE UP (ref 0–0.5)
EOSINOPHIL NFR BLD AUTO: 3.8 % — SIGNIFICANT CHANGE UP (ref 0–6)
EOSINOPHIL NFR BLD MANUAL: 2.6 % — SIGNIFICANT CHANGE UP (ref 0–6)
GIANT PLATELETS BLD QL SMEAR: PRESENT
GLUCOSE SERPL-MCNC: 236 MG/DL — HIGH (ref 70–99)
GLUCOSE UR QL: 250 MG/DL
HCT VFR BLD CALC: 51.9 % — HIGH (ref 39–50)
HGB BLD-MCNC: 17.3 G/DL — HIGH (ref 13–17)
IMM GRANULOCYTES # BLD AUTO: 0.12 K/UL — HIGH (ref 0–0.07)
IMM GRANULOCYTES NFR BLD AUTO: 2.3 % — HIGH (ref 0–0.9)
KETONES UR QL: ABNORMAL MG/DL
LEUKOCYTE ESTERASE UR-ACNC: NEGATIVE — SIGNIFICANT CHANGE UP
LYMPHOCYTES # BLD AUTO: 1.74 K/UL — SIGNIFICANT CHANGE UP (ref 1–3.3)
LYMPHOCYTES # BLD MANUAL: 0.97 K/UL — LOW (ref 1–3.3)
LYMPHOCYTES NFR BLD AUTO: 32.9 % — SIGNIFICANT CHANGE UP (ref 13–44)
LYMPHOCYTES NFR BLD MANUAL: 18.4 % — SIGNIFICANT CHANGE UP (ref 13–44)
MANUAL METAMYELOCYTE #: 0.1 K/UL — HIGH (ref 0–0)
MANUAL REACTIVE LYMPHOCYTES #: 0.28 K/UL — SIGNIFICANT CHANGE UP (ref 0–0.63)
MCHC RBC-ENTMCNC: 31.9 PG — SIGNIFICANT CHANGE UP (ref 27–34)
MCHC RBC-ENTMCNC: 33.3 G/DL — SIGNIFICANT CHANGE UP (ref 32–36)
MCV RBC AUTO: 95.6 FL — SIGNIFICANT CHANGE UP (ref 80–100)
METAMYELOCYTES # FLD: 1.8 % — HIGH (ref 0–0)
METAMYELOCYTES NFR BLD: 1.8 % — HIGH (ref 0–0)
MONOCYTES # BLD AUTO: 0.6 K/UL — SIGNIFICANT CHANGE UP (ref 0–0.9)
MONOCYTES # BLD MANUAL: 0.74 K/UL — SIGNIFICANT CHANGE UP (ref 0–0.9)
MONOCYTES NFR BLD AUTO: 11.3 % — SIGNIFICANT CHANGE UP (ref 2–14)
MONOCYTES NFR BLD MANUAL: 14 % — SIGNIFICANT CHANGE UP (ref 2–14)
NEUTROPHILS # BLD AUTO: 2.56 K/UL — SIGNIFICANT CHANGE UP (ref 1.8–7.4)
NEUTROPHILS # BLD MANUAL: 3.02 K/UL — SIGNIFICANT CHANGE UP (ref 1.8–7.4)
NEUTROPHILS NFR BLD AUTO: 48.4 % — SIGNIFICANT CHANGE UP (ref 43–77)
NEUTROPHILS NFR BLD MANUAL: 57 % — SIGNIFICANT CHANGE UP (ref 43–77)
NITRITE UR-MCNC: NEGATIVE — SIGNIFICANT CHANGE UP
NRBC # BLD AUTO: 0 K/UL — SIGNIFICANT CHANGE UP (ref 0–0)
NRBC # FLD: 0 K/UL — SIGNIFICANT CHANGE UP (ref 0–0)
NRBC BLD AUTO-RTO: 0 /100 WBCS — SIGNIFICANT CHANGE UP (ref 0–0)
OSMOLALITY UR: 176 MOSM/KG — LOW (ref 300–1000)
OVALOCYTES BLD QL SMEAR: SLIGHT — SIGNIFICANT CHANGE UP
PH UR: 6 — SIGNIFICANT CHANGE UP (ref 5–8)
PLAT MORPH BLD: NORMAL — SIGNIFICANT CHANGE UP
PLATELET # BLD AUTO: 142 K/UL — LOW (ref 150–400)
PMV BLD: 9.5 FL — SIGNIFICANT CHANGE UP (ref 7–13)
POIKILOCYTOSIS BLD QL AUTO: SLIGHT — SIGNIFICANT CHANGE UP
POTASSIUM SERPL-MCNC: 4.7 MMOL/L — SIGNIFICANT CHANGE UP (ref 3.5–5.3)
POTASSIUM SERPL-SCNC: 4.7 MMOL/L — SIGNIFICANT CHANGE UP (ref 3.5–5.3)
POTASSIUM UR-SCNC: 10 MMOL/L — SIGNIFICANT CHANGE UP
PROT ?TM UR-MCNC: 5 MG/DL — SIGNIFICANT CHANGE UP (ref 0–12)
PROT SERPL-MCNC: 6.5 G/DL — LOW (ref 6.6–8.7)
PROT UR-MCNC: NEGATIVE MG/DL — SIGNIFICANT CHANGE UP
PROT/CREAT UR-RTO: 0.1 RATIO — SIGNIFICANT CHANGE UP
RAPID RVP RESULT: SIGNIFICANT CHANGE UP
RBC # BLD: 5.43 M/UL — SIGNIFICANT CHANGE UP (ref 4.2–5.8)
RBC # FLD: 14.9 % — HIGH (ref 10.3–14.5)
RBC BLD AUTO: ABNORMAL
RBC CASTS # UR COMP ASSIST: 0 /HPF — SIGNIFICANT CHANGE UP (ref 0–4)
SARS-COV-2 RNA SPEC QL NAA+PROBE: SIGNIFICANT CHANGE UP
SMUDGE CELLS # BLD: PRESENT
SODIUM SERPL-SCNC: 139 MMOL/L — SIGNIFICANT CHANGE UP (ref 135–145)
SODIUM UR-SCNC: <30 MMOL/L — SIGNIFICANT CHANGE UP
SP GR SPEC: 1.01 — SIGNIFICANT CHANGE UP (ref 1–1.03)
T4 AB SER-ACNC: 4.2 UG/DL — LOW (ref 4.5–12)
TSH SERPL-MCNC: <0.1 UIU/ML — LOW (ref 0.27–4.2)
UROBILINOGEN FLD QL: 0.2 MG/DL — SIGNIFICANT CHANGE UP (ref 0.2–1)
VALPROATE SERPL-MCNC: 7.8 UG/ML — LOW (ref 50–100)
VARIANT LYMPHS # BLD: 5.3 % — SIGNIFICANT CHANGE UP (ref 0–6)
VARIANT LYMPHS NFR BLD MANUAL: 5.3 % — SIGNIFICANT CHANGE UP (ref 0–6)
WBC # BLD: 5.29 K/UL — SIGNIFICANT CHANGE UP (ref 3.8–10.5)
WBC # FLD AUTO: 5.29 K/UL — SIGNIFICANT CHANGE UP (ref 3.8–10.5)
WBC UR QL: 0 /HPF — SIGNIFICANT CHANGE UP (ref 0–5)

## 2025-05-29 PROCEDURE — 85025 COMPLETE CBC W/AUTO DIFF WBC: CPT

## 2025-05-29 PROCEDURE — 80164 ASSAY DIPROPYLACETIC ACD TOT: CPT

## 2025-05-29 PROCEDURE — 71045 X-RAY EXAM CHEST 1 VIEW: CPT

## 2025-05-29 PROCEDURE — 84300 ASSAY OF URINE SODIUM: CPT

## 2025-05-29 PROCEDURE — 84133 ASSAY OF URINE POTASSIUM: CPT

## 2025-05-29 PROCEDURE — 84436 ASSAY OF TOTAL THYROXINE: CPT

## 2025-05-29 PROCEDURE — 81001 URINALYSIS AUTO W/SCOPE: CPT

## 2025-05-29 PROCEDURE — 99285 EMERGENCY DEPT VISIT HI MDM: CPT

## 2025-05-29 PROCEDURE — 84540 ASSAY OF URINE/UREA-N: CPT

## 2025-05-29 PROCEDURE — 99284 EMERGENCY DEPT VISIT MOD MDM: CPT | Mod: 25

## 2025-05-29 PROCEDURE — 84443 ASSAY THYROID STIM HORMONE: CPT

## 2025-05-29 PROCEDURE — 0225U NFCT DS DNA&RNA 21 SARSCOV2: CPT

## 2025-05-29 PROCEDURE — 84156 ASSAY OF PROTEIN URINE: CPT

## 2025-05-29 PROCEDURE — 82140 ASSAY OF AMMONIA: CPT

## 2025-05-29 PROCEDURE — 82570 ASSAY OF URINE CREATININE: CPT

## 2025-05-29 PROCEDURE — 70450 CT HEAD/BRAIN W/O DYE: CPT | Mod: 26

## 2025-05-29 PROCEDURE — 87086 URINE CULTURE/COLONY COUNT: CPT

## 2025-05-29 PROCEDURE — 80053 COMPREHEN METABOLIC PANEL: CPT

## 2025-05-29 PROCEDURE — 36415 COLL VENOUS BLD VENIPUNCTURE: CPT

## 2025-05-29 PROCEDURE — 71045 X-RAY EXAM CHEST 1 VIEW: CPT | Mod: 26

## 2025-05-29 PROCEDURE — 83935 ASSAY OF URINE OSMOLALITY: CPT

## 2025-05-29 PROCEDURE — 70450 CT HEAD/BRAIN W/O DYE: CPT

## 2025-05-29 RX ADMIN — Medication 1000 MILLILITER(S): at 22:32

## 2025-05-29 NOTE — ED ADULT NURSE NOTE - OBJECTIVE STATEMENT
Pt A&OX2, accompanied by family. Came in w/ c/o AMS, lethargy, headache, nausea, fevers, cough. Hx of diabetes insipidus, has not taken medication x4 days. Denies chest pain, SOB, palpitations, numbness, weakness, tingling. VS stable, RR even and unlabored, safety maintained.

## 2025-05-29 NOTE — ED PROVIDER NOTE - OBJECTIVE STATEMENT
74 yo M PMHx craniopharyngioma, T2DM, diabetes insipidus, hypothyroid presents to the ED for 6 days of confusion.  Despite triage note, wife and patient deny fever, cough.  Patient reports he is dependent on desmopressin and hydrocortisone for DI but has not had either since Tuesday, 3 days.  Patient took Tylenol for headache without relief, states it is worse because of the light in the hallway.  Denies fever, chills, chest pain, shortness of breath, nausea, vomiting, vision changes, abdominal pain, dysuria, back pain and all other complaints at this time.  States that he has not taken the medication because he did not allocat it to his pillbox.
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
## 2025-05-29 NOTE — ED PROVIDER NOTE - PHYSICAL EXAMINATION
General: Awake, alert, lying in bed in NAD  HEENT: Normocephalic, atraumatic. No scleral icterus or conjunctival injection. EOMI. Moist mucous membranes. Oropharynx clear.   Neck:. Soft and supple.  Cardiac: RRR, Peripheral pulses 2+ and symmetric. No LE edema.  Resp: Lungs CTAB. No accessory muscle use  Abd: Soft, non-tender, non-distended. No guarding, rebound, or rigidity.  Back: Spine midline and non-tender.   Skin: No rashes, abrasions, or lacerations.  Neuro: AO x 3. Moves all extremities symmetrically. Motor strength and sensation grossly intact.  Psych: Appropriate mood and affect

## 2025-05-29 NOTE — ED ADULT NURSE NOTE - NSFALLRISKINTERV_ED_ALL_ED
Assistance OOB with selected safe patient handling equipment if applicable/Assistance with ambulation/Communicate fall risk and risk factors to all staff, patient, and family/Monitor gait and stability/Monitor for mental status changes and reorient to person, place, and time, as needed/Provide visual cue: yellow wristband, yellow gown, etc/Reinforce activity limits and safety measures with patient and family/Toileting schedule using arm’s reach rule for commode and bathroom/Use of alarms - bed, stretcher, chair and/or video monitoring/Call bell, personal items and telephone in reach/Instruct patient to call for assistance before getting out of bed/chair/stretcher/Non-slip footwear applied when patient is off stretcher/[place] to call system/Physically safe environment - no spills, clutter or unnecessary equipment/Purposeful Proactive Rounding/Room/bathroom lighting operational, light cord in reach

## 2025-05-29 NOTE — ED PROVIDER NOTE - ATTENDING CONTRIBUTION TO CARE
75y M w/ hx DM, diabetes insipidus, HLD, THOMAS, DVT s/p IVC filter placement, PE on Xarelto, hypothyroid, craniopharyngioma s/p craniotomy and resection; presents for generalized weakness. Pt complains of feeling tired with headaches over the past week. Wife was concerned that pt seems somewhat more confused compared to baseline. Pt admits to missing some doses of his home medications over the past week; says he forgot to place them in his pillbox. Endorses subjective fever and cough. On my exam pt in no distress AAOx4. Heart RRR, lungs CTAB, abdomen soft and nontender, no focal neuro deficits. No emergent findings on labs/imaging/urine to explain symptoms. Pt hemodynamically stable and afebrile. Pt with TSH < 0.01 and T4 of 4.2 -- advised of adhere to home medications as prescribed and to f/u with endocrinologist. Pt and family agreeable to plan. Stable for discharge with return precautions.

## 2025-05-29 NOTE — ED PROVIDER NOTE - CLINICAL SUMMARY MEDICAL DECISION MAKING FREE TEXT BOX
74 yo M PMHx craniopharyngioma, T2DM, diabetes insipidus, hypothyroid presents to the ED for 6 days of confusion.     Denies infectious symptoms, will obtain CT head for possible intracranial pathology/ encephalopathy, chest x-ray, lab tests, RVP, TSH, UA to assess for causes of altered mental status. On exam, pt is AOOx3-4.

## 2025-05-29 NOTE — ED PROVIDER NOTE - PATIENT PORTAL LINK FT
You can access the FollowMyHealth Patient Portal offered by [facility] by registering at the following website: [email or web address]. By joining [employer]’s FollowMyHealth portal, you will also be able to view your health information using other applications (apps) compatible with our system.

## 2025-05-29 NOTE — ED PROVIDER NOTE - NSFOLLOWUPINSTRUCTIONS_ED_ALL_ED_FT
[Mother] : mother - Please make sure to take all your home medications as prescribed.   - Follow up with your primary care doctor and your endocrinologist.  - Return to the emergency for any new or worsening issues.    =================================    Weakness    WHAT YOU NEED TO KNOW:    What is weakness? Weakness is a loss of muscle strength. You may have weakness in a single muscle or in a group of muscles. Weakness can come and go or be constant. Weakness can get worse over time. You may have weakness for a short time, or it may be permanent.    What causes or increases my risk for weakness?    Older age    A problem in your brain, nerves, or muscles    A condition such as dehydration, a heart problem, infection, or pregnancy    Anxiety or depression    Steroid or heart medicine, or muscle relaxers    Alcohol or illegal drugs    Lack of movement, such as from wearing a cast or splint, or being on bed rest  How is the cause of weakness diagnosed? Your healthcare provider will ask when your signs and symptoms started and what makes your weakness worse. Tell your provider about any medical conditions you have. He or she will test your muscle strength, reflexes, and sense of touch. He or she will also check how far you can move or lift your weakened area. You may also need any of the following:    Blood tests may be used to check for infection or another condition that can cause weakness.    A muscle biopsy is a procedure used to take a muscle sample. This may happen if your blood tests do not show the cause of your weakness.    X-ray pictures may show what is causing your weakness.  How can I manage weakness?    Use assistive devices as directed. These help protect you from injury. Examples include a walker or cane. Have someone install handrails in your home. These will help you get out of a bathtub or stand up from a toilet. Use a shower chair so you can sit while you shower. Sit down on the toilet or another chair to dry off and put on your clothes. Get help going up and down stairs if your legs are weak.    Go to physical or occupational therapy if directed. A physical therapist can teach you exercises to help strengthen weak muscles. An occupational therapist can show you ways to do your daily activities more easily. For example, light forks and spoons can be easier to use if you have hand weakness. You may also learn ways to organize your household items so you are not moving heavy items.    Balance rest with exercise. Exercise can help increase your muscle strength and energy. Do not exercise for long periods at a time. Take breaks often to rest. Too much exercise can cause muscle strain or make you more tired. Ask your healthcare provider how much exercise is right for you.    Eat a variety of healthy foods. Too much or too little food may cause weakness or tiredness. Ask your healthcare provider what a healthy amount of food is for you. Healthy foods include fruits, vegetables, whole-grain breads, low-fat dairy products, lean meats and fish, nuts, and cooked beans.    Do not smoke. Nicotine and other chemicals in cigarettes and cigars can make your symptoms worse, and can cause lung damage. Ask your healthcare provider for information if you currently smoke and need help to quit. E-cigarettes or smokeless tobacco still contain nicotine. Talk to your healthcare provider before you use these products.    Do not use caffeine, alcohol, or illegal drugs. These may cause muscle twitching, which could lead to worsened weakness.  Call 911 for any of the following:    You have any of the following signs of a stroke:  Numbness or drooping on one side of your face    Weakness in an arm or leg    Confusion or difficulty speaking    Dizziness, a severe headache, or vision loss    You lose feeling in your weakened body area.    You have electric shock-like feelings down your arms and legs when you flex or move your neck.    You have sudden or increased trouble speaking, swallowing, or breathing.  When should I seek immediate care?    You have severe pain in your back, arms, or legs that worsens.    You have sudden or worsened muscle weakness or loss of movement.    You are not able to control when you urinate or have a bowel movement.  When should I contact my healthcare provider?    You feel depressed or anxious.    You have questions or concerns about your condition or care.  CARE AGREEMENT:    You have the right to help plan your care. Learn about your health condition and how it may be treated. Discuss treatment options with your healthcare providers to decide what care you want to receive. You always have the right to refuse treatment.

## 2025-05-30 LAB — UUN UR-MCNC: 232 MG/DL — SIGNIFICANT CHANGE UP

## 2025-05-31 LAB
CULTURE RESULTS: SIGNIFICANT CHANGE UP
SPECIMEN SOURCE: SIGNIFICANT CHANGE UP

## 2025-05-31 NOTE — ED POST DISCHARGE NOTE - REASON FOR FOLLOW-UP
Pt seeing nephrology on 5-13 informed can come in a week prior for labs daughter verbalized understanding   Radiology Follow-up

## 2025-05-31 NOTE — ED POST DISCHARGE NOTE - DETAILS
results d/w pt and wife. Pt's wife states pt was told he had similar finding on a prior XR. verbalized understanding of recommendation to f/u for CT chest. Also stating pt is still very confused and is worsening since ED visit. pt c/o feeling "feverish" at times. Pt's wife states she plans on bringing pt back to ED for re-evaluation.

## 2025-06-02 ENCOUNTER — INPATIENT (INPATIENT)
Facility: HOSPITAL | Age: 75
LOS: 1 days | Discharge: HOME HEALTH | DRG: 948 | End: 2025-06-04
Attending: STUDENT IN AN ORGANIZED HEALTH CARE EDUCATION/TRAINING PROGRAM | Admitting: HOSPITALIST
Payer: MEDICARE

## 2025-06-02 VITALS — HEIGHT: 71 IN | WEIGHT: 154.98 LBS

## 2025-06-02 DIAGNOSIS — R41.82 ALTERED MENTAL STATUS, UNSPECIFIED: ICD-10-CM

## 2025-06-02 DIAGNOSIS — Z95.828 PRESENCE OF OTHER VASCULAR IMPLANTS AND GRAFTS: Chronic | ICD-10-CM

## 2025-06-02 DIAGNOSIS — Z98.890 OTHER SPECIFIED POSTPROCEDURAL STATES: Chronic | ICD-10-CM

## 2025-06-02 LAB
ALBUMIN SERPL ELPH-MCNC: 3.7 G/DL — SIGNIFICANT CHANGE UP (ref 3.3–5)
ALP SERPL-CCNC: 88 U/L — SIGNIFICANT CHANGE UP (ref 40–120)
ALT FLD-CCNC: 61 U/L — SIGNIFICANT CHANGE UP (ref 12–78)
ANION GAP SERPL CALC-SCNC: 7 MMOL/L — SIGNIFICANT CHANGE UP (ref 5–17)
AST SERPL-CCNC: 27 U/L — SIGNIFICANT CHANGE UP (ref 15–37)
BASOPHILS # BLD AUTO: 0.05 K/UL — SIGNIFICANT CHANGE UP (ref 0–0.2)
BASOPHILS NFR BLD AUTO: 0.9 % — SIGNIFICANT CHANGE UP (ref 0–2)
BILIRUB SERPL-MCNC: 0.6 MG/DL — SIGNIFICANT CHANGE UP (ref 0.2–1.2)
BUN SERPL-MCNC: 15 MG/DL — SIGNIFICANT CHANGE UP (ref 7–23)
CALCIUM SERPL-MCNC: 9.5 MG/DL — SIGNIFICANT CHANGE UP (ref 8.5–10.1)
CHLORIDE SERPL-SCNC: 101 MMOL/L — SIGNIFICANT CHANGE UP (ref 96–108)
CO2 SERPL-SCNC: 25 MMOL/L — SIGNIFICANT CHANGE UP (ref 22–31)
CREAT SERPL-MCNC: 1.51 MG/DL — HIGH (ref 0.5–1.3)
EGFR: 48 ML/MIN/1.73M2 — LOW
EGFR: 48 ML/MIN/1.73M2 — LOW
EOSINOPHIL # BLD AUTO: 0.2 K/UL — SIGNIFICANT CHANGE UP (ref 0–0.5)
EOSINOPHIL NFR BLD AUTO: 3.7 % — SIGNIFICANT CHANGE UP (ref 0–6)
GLUCOSE SERPL-MCNC: 268 MG/DL — HIGH (ref 70–99)
HCT VFR BLD CALC: 50.7 % — HIGH (ref 39–50)
HGB BLD-MCNC: 17.3 G/DL — HIGH (ref 13–17)
IMM GRANULOCYTES # BLD AUTO: 0.06 K/UL — SIGNIFICANT CHANGE UP (ref 0–0.07)
IMM GRANULOCYTES NFR BLD AUTO: 1.1 % — HIGH (ref 0–0.9)
LIDOCAIN IGE QN: 31 U/L — SIGNIFICANT CHANGE UP (ref 13–75)
LYMPHOCYTES # BLD AUTO: 1.94 K/UL — SIGNIFICANT CHANGE UP (ref 1–3.3)
LYMPHOCYTES NFR BLD AUTO: 36.1 % — SIGNIFICANT CHANGE UP (ref 13–44)
MCHC RBC-ENTMCNC: 32.3 PG — SIGNIFICANT CHANGE UP (ref 27–34)
MCHC RBC-ENTMCNC: 34.1 G/DL — SIGNIFICANT CHANGE UP (ref 32–36)
MCV RBC AUTO: 94.8 FL — SIGNIFICANT CHANGE UP (ref 80–100)
MONOCYTES # BLD AUTO: 0.47 K/UL — SIGNIFICANT CHANGE UP (ref 0–0.9)
MONOCYTES NFR BLD AUTO: 8.7 % — SIGNIFICANT CHANGE UP (ref 2–14)
NEUTROPHILS # BLD AUTO: 2.66 K/UL — SIGNIFICANT CHANGE UP (ref 1.8–7.4)
NEUTROPHILS NFR BLD AUTO: 49.5 % — SIGNIFICANT CHANGE UP (ref 43–77)
NRBC # BLD AUTO: 0 K/UL — SIGNIFICANT CHANGE UP (ref 0–0)
NRBC # FLD: 0 K/UL — SIGNIFICANT CHANGE UP (ref 0–0)
NRBC BLD AUTO-RTO: 0 /100 WBCS — SIGNIFICANT CHANGE UP (ref 0–0)
PLATELET # BLD AUTO: 142 K/UL — LOW (ref 150–400)
PMV BLD: 9.7 FL — SIGNIFICANT CHANGE UP (ref 7–13)
POTASSIUM SERPL-MCNC: 3.6 MMOL/L — SIGNIFICANT CHANGE UP (ref 3.5–5.3)
POTASSIUM SERPL-SCNC: 3.6 MMOL/L — SIGNIFICANT CHANGE UP (ref 3.5–5.3)
PROT SERPL-MCNC: 7.1 GM/DL — SIGNIFICANT CHANGE UP (ref 6–8.3)
RBC # BLD: 5.35 M/UL — SIGNIFICANT CHANGE UP (ref 4.2–5.8)
RBC # FLD: 14.4 % — SIGNIFICANT CHANGE UP (ref 10.3–14.5)
SODIUM SERPL-SCNC: 133 MMOL/L — LOW (ref 135–145)
TROPONIN I, HIGH SENSITIVITY RESULT: 7.37 NG/L — SIGNIFICANT CHANGE UP
WBC # BLD: 5.38 K/UL — SIGNIFICANT CHANGE UP (ref 3.8–10.5)
WBC # FLD AUTO: 5.38 K/UL — SIGNIFICANT CHANGE UP (ref 3.8–10.5)

## 2025-06-02 PROCEDURE — 80307 DRUG TEST PRSMV CHEM ANLYZR: CPT

## 2025-06-02 PROCEDURE — 80048 BASIC METABOLIC PNL TOTAL CA: CPT

## 2025-06-02 PROCEDURE — 97116 GAIT TRAINING THERAPY: CPT | Mod: GP

## 2025-06-02 PROCEDURE — 83036 HEMOGLOBIN GLYCOSYLATED A1C: CPT

## 2025-06-02 PROCEDURE — A9579: CPT

## 2025-06-02 PROCEDURE — 99285 EMERGENCY DEPT VISIT HI MDM: CPT

## 2025-06-02 PROCEDURE — 84443 ASSAY THYROID STIM HORMONE: CPT

## 2025-06-02 PROCEDURE — 70450 CT HEAD/BRAIN W/O DYE: CPT

## 2025-06-02 PROCEDURE — 97163 PT EVAL HIGH COMPLEX 45 MIN: CPT | Mod: GP

## 2025-06-02 PROCEDURE — 85027 COMPLETE CBC AUTOMATED: CPT

## 2025-06-02 PROCEDURE — 84436 ASSAY OF TOTAL THYROXINE: CPT

## 2025-06-02 PROCEDURE — 82607 VITAMIN B-12: CPT

## 2025-06-02 PROCEDURE — 82962 GLUCOSE BLOOD TEST: CPT

## 2025-06-02 PROCEDURE — 70553 MRI BRAIN STEM W/O & W/DYE: CPT

## 2025-06-02 PROCEDURE — 82140 ASSAY OF AMMONIA: CPT

## 2025-06-02 PROCEDURE — 36415 COLL VENOUS BLD VENIPUNCTURE: CPT

## 2025-06-02 PROCEDURE — 95816 EEG AWAKE AND DROWSY: CPT

## 2025-06-02 PROCEDURE — 84480 ASSAY TRIIODOTHYRONINE (T3): CPT

## 2025-06-02 PROCEDURE — 84439 ASSAY OF FREE THYROXINE: CPT

## 2025-06-02 PROCEDURE — 70450 CT HEAD/BRAIN W/O DYE: CPT | Mod: 26

## 2025-06-02 PROCEDURE — 81003 URINALYSIS AUTO W/O SCOPE: CPT

## 2025-06-02 PROCEDURE — 71250 CT THORAX DX C-: CPT | Mod: 26

## 2025-06-02 PROCEDURE — 71046 X-RAY EXAM CHEST 2 VIEWS: CPT | Mod: 26

## 2025-06-02 RX ORDER — TAMSULOSIN HYDROCHLORIDE 0.4 MG/1
0.8 CAPSULE ORAL AT BEDTIME
Refills: 0 | Status: DISCONTINUED | OUTPATIENT
Start: 2025-06-02 | End: 2025-06-04

## 2025-06-02 RX ORDER — METFORMIN HYDROCHLORIDE 850 MG/1
2 TABLET ORAL
Refills: 0 | DISCHARGE

## 2025-06-02 RX ORDER — DESMOPRESSIN ACETATE 4 UG/ML
0.05 INJECTION INTRAVENOUS ONCE
Refills: 0 | Status: COMPLETED | OUTPATIENT
Start: 2025-06-02 | End: 2025-06-03

## 2025-06-02 RX ORDER — DEXTROAMPHETAMINE SACCHARATE, AMPHETAMINE ASPARTATE MONOHYDRATE, DEXTROAMPHETAMINE SULFATE AND AMPHETAMINE SULFATE 2.5; 2.5; 2.5; 2.5 MG/1; MG/1; MG/1; MG/1
1 CAPSULE, EXTENDED RELEASE ORAL
Refills: 0 | DISCHARGE

## 2025-06-02 RX ORDER — TESTOSTERONE 5.5 MG/1
0.75 GEL NASAL
Refills: 0 | DISCHARGE

## 2025-06-02 RX ORDER — TOPIRAMATE 25 MG/1
100 TABLET, FILM COATED ORAL ONCE
Refills: 0 | Status: COMPLETED | OUTPATIENT
Start: 2025-06-02 | End: 2025-06-03

## 2025-06-02 RX ORDER — VENLAFAXINE HYDROCHLORIDE 37.5 MG/1
150 CAPSULE, EXTENDED RELEASE ORAL DAILY
Refills: 0 | Status: DISCONTINUED | OUTPATIENT
Start: 2025-06-02 | End: 2025-06-03

## 2025-06-02 RX ORDER — METOPROLOL SUCCINATE 50 MG/1
25 TABLET, EXTENDED RELEASE ORAL
Refills: 0 | Status: DISCONTINUED | OUTPATIENT
Start: 2025-06-02 | End: 2025-06-04

## 2025-06-02 RX ORDER — METOCLOPRAMIDE HCL 10 MG
5 TABLET ORAL THREE TIMES A DAY
Refills: 0 | Status: DISCONTINUED | OUTPATIENT
Start: 2025-06-02 | End: 2025-06-04

## 2025-06-02 RX ORDER — HYDROCORTISONE 20 MG
10 TABLET ORAL ONCE
Refills: 0 | Status: COMPLETED | OUTPATIENT
Start: 2025-06-02 | End: 2025-06-03

## 2025-06-02 RX ORDER — TOPIRAMATE 25 MG/1
100 TABLET, FILM COATED ORAL
Refills: 0 | Status: DISCONTINUED | OUTPATIENT
Start: 2025-06-03 | End: 2025-06-04

## 2025-06-02 RX ORDER — TIRZEPATIDE 7.5 MG/.5ML
2.5 INJECTION, SOLUTION SUBCUTANEOUS
Refills: 0 | DISCHARGE

## 2025-06-02 RX ORDER — LEVOTHYROXINE SODIUM 300 MCG
1 TABLET ORAL
Refills: 0 | DISCHARGE

## 2025-06-02 RX ORDER — SUCRALFATE 1 G
2 TABLET ORAL
Refills: 0 | Status: DISCONTINUED | OUTPATIENT
Start: 2025-06-02 | End: 2025-06-04

## 2025-06-02 RX ORDER — HYDROCORTISONE 20 MG
5 TABLET ORAL
Refills: 0 | Status: DISCONTINUED | OUTPATIENT
Start: 2025-06-03 | End: 2025-06-04

## 2025-06-02 RX ORDER — SUCRALFATE 1 G
2 TABLET ORAL
Refills: 0 | DISCHARGE

## 2025-06-02 RX ORDER — DESMOPRESSIN ACETATE 4 UG/ML
0.05 INJECTION INTRAVENOUS
Refills: 0 | Status: DISCONTINUED | OUTPATIENT
Start: 2025-06-03 | End: 2025-06-04

## 2025-06-02 RX ORDER — FERROUS SULFATE 137(45) MG
325 TABLET, EXTENDED RELEASE ORAL DAILY
Refills: 0 | Status: DISCONTINUED | OUTPATIENT
Start: 2025-06-02 | End: 2025-06-04

## 2025-06-02 RX ORDER — TOPIRAMATE 25 MG/1
1 TABLET, FILM COATED ORAL
Refills: 0 | DISCHARGE

## 2025-06-02 RX ORDER — RIVAROXABAN 10 MG/1
20 TABLET, FILM COATED ORAL DAILY
Refills: 0 | Status: DISCONTINUED | OUTPATIENT
Start: 2025-06-03 | End: 2025-06-04

## 2025-06-02 RX ORDER — ASPIRIN 325 MG
81 TABLET ORAL DAILY
Refills: 0 | Status: DISCONTINUED | OUTPATIENT
Start: 2025-06-02 | End: 2025-06-04

## 2025-06-02 RX ORDER — METOPROLOL SUCCINATE 50 MG/1
1 TABLET, EXTENDED RELEASE ORAL
Refills: 0 | DISCHARGE

## 2025-06-02 RX ORDER — RIVAROXABAN 10 MG/1
20 TABLET, FILM COATED ORAL ONCE
Refills: 0 | Status: COMPLETED | OUTPATIENT
Start: 2025-06-02 | End: 2025-06-03

## 2025-06-02 RX ORDER — ATORVASTATIN CALCIUM 80 MG/1
40 TABLET, FILM COATED ORAL AT BEDTIME
Refills: 0 | Status: DISCONTINUED | OUTPATIENT
Start: 2025-06-02 | End: 2025-06-04

## 2025-06-02 RX ORDER — LEVOTHYROXINE SODIUM 300 MCG
150 TABLET ORAL DAILY
Refills: 0 | Status: DISCONTINUED | OUTPATIENT
Start: 2025-06-02 | End: 2025-06-04

## 2025-06-02 RX ORDER — VIBEGRON 75 MG/1
1 TABLET, FILM COATED ORAL
Refills: 0 | DISCHARGE

## 2025-06-02 RX ORDER — INSULIN DEGLUDEC 100 U/ML
25 INJECTION, SOLUTION SUBCUTANEOUS
Refills: 0 | DISCHARGE

## 2025-06-02 RX ADMIN — Medication 1000 MILLILITER(S): at 18:17

## 2025-06-02 NOTE — ED STATDOCS - ATTENDING APP SHARED VISIT CONTRIBUTION OF CARE
I personally saw the patient with the SHARMILA, and completed the key components of the history and physical exam. I then discussed the management plan with the SHARMILA.

## 2025-06-02 NOTE — ED STATDOCS - OBJECTIVE STATEMENT
75 year old male with PMHx of brain tumor, HLD, DVT, hypothyroidism, DM on Mounjaro, Metformin 1000mg BID, presents to ED c/o hyperglycemia x1 week. wife at bedside states the patient has additionally been increasingly confused and dehydrated, and his blood sugar has remained elevated despite being compliant with all his medications. patient does not hod any complaints of pain, denies difficulty urinating. patient was seen at [facility] on Sunday and had a workup done with no pertinent findings.

## 2025-06-02 NOTE — ED ADULT TRIAGE NOTE - CHIEF COMPLAINT QUOTE
Pt bibspouse c/o high sugar and "memory issues" x 1 week.  at triage. Last meal 2pm. Pt A&OX4. On Xarelto. HX DM2, complaint with medication.

## 2025-06-02 NOTE — ED STATDOCS - CLINICAL SUMMARY MEDICAL DECISION MAKING FREE TEXT BOX
75 year old male with elevated blood sugars and some confusion for the last several days. seen at [facility] with an extensive workup including CT scan showed no cause for confusion or elevated sugars. will check labs, urine, and reassess.

## 2025-06-02 NOTE — ED ADULT NURSE NOTE - OBJECTIVE STATEMENT
pt presented to the ER ambulatory from home c/o loss of memory for about a week now, high sugar and R/wrist pain. pt family denies history of forgetfulness, dizziness or headache. a/ox4

## 2025-06-02 NOTE — ED STATDOCS - PROGRESS NOTE DETAILS
I, MICHAEL Fraga personally discussed the case with consultant, Dr. Griffiths attending hospitalist, about pt who has altered mental status and will need hospital admission."

## 2025-06-03 LAB
A1C WITH ESTIMATED AVERAGE GLUCOSE RESULT: 9.4 % — HIGH (ref 4–5.6)
AMMONIA BLD-MCNC: 27 UMOL/L — SIGNIFICANT CHANGE UP (ref 11–32)
AMPHET UR-MCNC: NEGATIVE — SIGNIFICANT CHANGE UP
ANION GAP SERPL CALC-SCNC: 3 MMOL/L — LOW (ref 5–17)
APPEARANCE UR: CLEAR — SIGNIFICANT CHANGE UP
BARBITURATES UR SCN-MCNC: NEGATIVE — SIGNIFICANT CHANGE UP
BENZODIAZ UR-MCNC: NEGATIVE — SIGNIFICANT CHANGE UP
BILIRUB UR-MCNC: NEGATIVE — SIGNIFICANT CHANGE UP
BUN SERPL-MCNC: 12 MG/DL — SIGNIFICANT CHANGE UP (ref 7–23)
CALCIUM SERPL-MCNC: 8.5 MG/DL — SIGNIFICANT CHANGE UP (ref 8.5–10.1)
CHLORIDE SERPL-SCNC: 105 MMOL/L — SIGNIFICANT CHANGE UP (ref 96–108)
CO2 SERPL-SCNC: 25 MMOL/L — SIGNIFICANT CHANGE UP (ref 22–31)
COCAINE METAB.OTHER UR-MCNC: NEGATIVE — SIGNIFICANT CHANGE UP
COLOR SPEC: SIGNIFICANT CHANGE UP
CREAT SERPL-MCNC: 1.38 MG/DL — HIGH (ref 0.5–1.3)
DIFF PNL FLD: NEGATIVE — SIGNIFICANT CHANGE UP
EGFR: 54 ML/MIN/1.73M2 — LOW
EGFR: 54 ML/MIN/1.73M2 — LOW
ESTIMATED AVERAGE GLUCOSE: 223 MG/DL — HIGH (ref 68–114)
FENTANYL UR QL SCN: NEGATIVE — SIGNIFICANT CHANGE UP
GLUCOSE BLDC GLUCOMTR-MCNC: 174 MG/DL — HIGH (ref 70–99)
GLUCOSE BLDC GLUCOMTR-MCNC: 180 MG/DL — HIGH (ref 70–99)
GLUCOSE BLDC GLUCOMTR-MCNC: 204 MG/DL — HIGH (ref 70–99)
GLUCOSE BLDC GLUCOMTR-MCNC: 227 MG/DL — HIGH (ref 70–99)
GLUCOSE SERPL-MCNC: 236 MG/DL — HIGH (ref 70–99)
GLUCOSE UR QL: >=1000 MG/DL
HCT VFR BLD CALC: 47.6 % — SIGNIFICANT CHANGE UP (ref 39–50)
HGB BLD-MCNC: 16.2 G/DL — SIGNIFICANT CHANGE UP (ref 13–17)
KETONES UR QL: ABNORMAL MG/DL
LEUKOCYTE ESTERASE UR-ACNC: NEGATIVE — SIGNIFICANT CHANGE UP
MCHC RBC-ENTMCNC: 32.4 PG — SIGNIFICANT CHANGE UP (ref 27–34)
MCHC RBC-ENTMCNC: 34 G/DL — SIGNIFICANT CHANGE UP (ref 32–36)
MCV RBC AUTO: 95.2 FL — SIGNIFICANT CHANGE UP (ref 80–100)
METHADONE UR-MCNC: NEGATIVE — SIGNIFICANT CHANGE UP
NITRITE UR-MCNC: NEGATIVE — SIGNIFICANT CHANGE UP
NRBC # BLD AUTO: 0 K/UL — SIGNIFICANT CHANGE UP (ref 0–0)
NRBC # FLD: 0 K/UL — SIGNIFICANT CHANGE UP (ref 0–0)
NRBC BLD AUTO-RTO: 0 /100 WBCS — SIGNIFICANT CHANGE UP (ref 0–0)
OPIATES UR-MCNC: NEGATIVE — SIGNIFICANT CHANGE UP
PCP SPEC-MCNC: SIGNIFICANT CHANGE UP
PCP UR-MCNC: NEGATIVE — SIGNIFICANT CHANGE UP
PH UR: 6 — SIGNIFICANT CHANGE UP (ref 5–8)
PLATELET # BLD AUTO: 137 K/UL — LOW (ref 150–400)
PMV BLD: 9.7 FL — SIGNIFICANT CHANGE UP (ref 7–13)
POTASSIUM SERPL-MCNC: 4.2 MMOL/L — SIGNIFICANT CHANGE UP (ref 3.5–5.3)
POTASSIUM SERPL-SCNC: 4.2 MMOL/L — SIGNIFICANT CHANGE UP (ref 3.5–5.3)
PROT UR-MCNC: NEGATIVE MG/DL — SIGNIFICANT CHANGE UP
RBC # BLD: 5 M/UL — SIGNIFICANT CHANGE UP (ref 4.2–5.8)
RBC # FLD: 14.7 % — HIGH (ref 10.3–14.5)
SODIUM SERPL-SCNC: 133 MMOL/L — LOW (ref 135–145)
SP GR SPEC: 1.02 — SIGNIFICANT CHANGE UP (ref 1–1.03)
T3 SERPL-MCNC: 90 NG/DL — SIGNIFICANT CHANGE UP (ref 80–200)
T4 AB SER-ACNC: 5.9 UG/DL — SIGNIFICANT CHANGE UP (ref 4.6–12)
T4 FREE SERPL-MCNC: 0.84 NG/DL — SIGNIFICANT CHANGE UP (ref 0.76–1.46)
THC UR QL: NEGATIVE — SIGNIFICANT CHANGE UP
TSH SERPL-MCNC: <0.01 UU/ML — LOW (ref 0.34–4.82)
UROBILINOGEN FLD QL: 1 MG/DL — SIGNIFICANT CHANGE UP (ref 0.2–1)
VIT B12 SERPL-MCNC: 414 PG/ML — SIGNIFICANT CHANGE UP (ref 232–1245)
WBC # BLD: 4.74 K/UL — SIGNIFICANT CHANGE UP (ref 3.8–10.5)
WBC # FLD AUTO: 4.74 K/UL — SIGNIFICANT CHANGE UP (ref 3.8–10.5)

## 2025-06-03 PROCEDURE — 99223 1ST HOSP IP/OBS HIGH 75: CPT

## 2025-06-03 PROCEDURE — 95816 EEG AWAKE AND DROWSY: CPT | Mod: 26

## 2025-06-03 PROCEDURE — 70553 MRI BRAIN STEM W/O & W/DYE: CPT | Mod: 26

## 2025-06-03 RX ORDER — INSULIN LISPRO 100 U/ML
10 INJECTION, SOLUTION INTRAVENOUS; SUBCUTANEOUS
Refills: 0 | Status: DISCONTINUED | OUTPATIENT
Start: 2025-06-03 | End: 2025-06-03

## 2025-06-03 RX ORDER — DEXTROSE 50 % IN WATER 50 %
25 SYRINGE (ML) INTRAVENOUS ONCE
Refills: 0 | Status: DISCONTINUED | OUTPATIENT
Start: 2025-06-03 | End: 2025-06-04

## 2025-06-03 RX ORDER — DEXTROSE 50 % IN WATER 50 %
15 SYRINGE (ML) INTRAVENOUS ONCE
Refills: 0 | Status: DISCONTINUED | OUTPATIENT
Start: 2025-06-03 | End: 2025-06-04

## 2025-06-03 RX ORDER — SODIUM CHLORIDE 9 G/1000ML
1000 INJECTION, SOLUTION INTRAVENOUS
Refills: 0 | Status: DISCONTINUED | OUTPATIENT
Start: 2025-06-03 | End: 2025-06-04

## 2025-06-03 RX ORDER — GLUCAGON 3 MG/1
1 POWDER NASAL ONCE
Refills: 0 | Status: DISCONTINUED | OUTPATIENT
Start: 2025-06-03 | End: 2025-06-04

## 2025-06-03 RX ORDER — VENLAFAXINE HYDROCHLORIDE 37.5 MG/1
150 CAPSULE, EXTENDED RELEASE ORAL DAILY
Refills: 0 | Status: DISCONTINUED | OUTPATIENT
Start: 2025-06-03 | End: 2025-06-04

## 2025-06-03 RX ORDER — INSULIN LISPRO 100 U/ML
INJECTION, SOLUTION INTRAVENOUS; SUBCUTANEOUS AT BEDTIME
Refills: 0 | Status: DISCONTINUED | OUTPATIENT
Start: 2025-06-03 | End: 2025-06-04

## 2025-06-03 RX ORDER — INSULIN LISPRO 100 U/ML
8 INJECTION, SOLUTION INTRAVENOUS; SUBCUTANEOUS
Refills: 0 | Status: DISCONTINUED | OUTPATIENT
Start: 2025-06-03 | End: 2025-06-04

## 2025-06-03 RX ORDER — INSULIN GLARGINE-YFGN 100 [IU]/ML
20 INJECTION, SOLUTION SUBCUTANEOUS
Refills: 0 | Status: DISCONTINUED | OUTPATIENT
Start: 2025-06-03 | End: 2025-06-04

## 2025-06-03 RX ORDER — DEXTROSE 50 % IN WATER 50 %
12.5 SYRINGE (ML) INTRAVENOUS ONCE
Refills: 0 | Status: DISCONTINUED | OUTPATIENT
Start: 2025-06-03 | End: 2025-06-04

## 2025-06-03 RX ORDER — INSULIN LISPRO 100 U/ML
INJECTION, SOLUTION INTRAVENOUS; SUBCUTANEOUS
Refills: 0 | Status: DISCONTINUED | OUTPATIENT
Start: 2025-06-03 | End: 2025-06-04

## 2025-06-03 RX ORDER — INSULIN GLARGINE-YFGN 100 [IU]/ML
15 INJECTION, SOLUTION SUBCUTANEOUS AT BEDTIME
Refills: 0 | Status: DISCONTINUED | OUTPATIENT
Start: 2025-06-03 | End: 2025-06-03

## 2025-06-03 RX ADMIN — Medication 5 MILLIGRAM(S): at 22:48

## 2025-06-03 RX ADMIN — Medication 100 MILLILITER(S): at 10:01

## 2025-06-03 RX ADMIN — Medication 325 MILLIGRAM(S): at 09:58

## 2025-06-03 RX ADMIN — TAMSULOSIN HYDROCHLORIDE 0.8 MILLIGRAM(S): 0.4 CAPSULE ORAL at 22:48

## 2025-06-03 RX ADMIN — Medication 5 MILLIGRAM(S): at 05:58

## 2025-06-03 RX ADMIN — Medication 2 GRAM(S): at 08:24

## 2025-06-03 RX ADMIN — Medication 500 MILLIGRAM(S): at 22:49

## 2025-06-03 RX ADMIN — Medication 10 MILLIEQUIVALENT(S): at 09:58

## 2025-06-03 RX ADMIN — Medication 500 MILLILITER(S): at 02:54

## 2025-06-03 RX ADMIN — Medication 500 MILLIGRAM(S): at 05:58

## 2025-06-03 RX ADMIN — RIVAROXABAN 20 MILLIGRAM(S): 10 TABLET, FILM COATED ORAL at 09:58

## 2025-06-03 RX ADMIN — VENLAFAXINE HYDROCHLORIDE 150 MILLIGRAM(S): 37.5 CAPSULE, EXTENDED RELEASE ORAL at 11:43

## 2025-06-03 RX ADMIN — Medication 40 MILLIGRAM(S): at 05:58

## 2025-06-03 RX ADMIN — METOPROLOL SUCCINATE 25 MILLIGRAM(S): 50 TABLET, EXTENDED RELEASE ORAL at 09:57

## 2025-06-03 RX ADMIN — METOPROLOL SUCCINATE 25 MILLIGRAM(S): 50 TABLET, EXTENDED RELEASE ORAL at 22:50

## 2025-06-03 RX ADMIN — Medication 81 MILLIGRAM(S): at 09:57

## 2025-06-03 RX ADMIN — Medication 5 MILLIGRAM(S): at 13:56

## 2025-06-03 RX ADMIN — Medication 10 MILLIGRAM(S): at 00:27

## 2025-06-03 RX ADMIN — TOPIRAMATE 100 MILLIGRAM(S): 25 TABLET, FILM COATED ORAL at 09:58

## 2025-06-03 RX ADMIN — RIVAROXABAN 20 MILLIGRAM(S): 10 TABLET, FILM COATED ORAL at 00:26

## 2025-06-03 RX ADMIN — Medication 5 MILLIGRAM(S): at 22:51

## 2025-06-03 RX ADMIN — DESMOPRESSIN ACETATE 0.05 MILLIGRAM(S): 4 INJECTION INTRAVENOUS at 22:50

## 2025-06-03 RX ADMIN — Medication 150 MICROGRAM(S): at 05:58

## 2025-06-03 RX ADMIN — TOPIRAMATE 100 MILLIGRAM(S): 25 TABLET, FILM COATED ORAL at 00:27

## 2025-06-03 RX ADMIN — ATORVASTATIN CALCIUM 40 MILLIGRAM(S): 80 TABLET, FILM COATED ORAL at 22:50

## 2025-06-03 RX ADMIN — INSULIN LISPRO 2: 100 INJECTION, SOLUTION INTRAVENOUS; SUBCUTANEOUS at 08:24

## 2025-06-03 RX ADMIN — INSULIN LISPRO 8 UNIT(S): 100 INJECTION, SOLUTION INTRAVENOUS; SUBCUTANEOUS at 08:24

## 2025-06-03 RX ADMIN — INSULIN LISPRO 1: 100 INJECTION, SOLUTION INTRAVENOUS; SUBCUTANEOUS at 17:03

## 2025-06-03 RX ADMIN — Medication 500 MILLIGRAM(S): at 09:59

## 2025-06-03 RX ADMIN — DESMOPRESSIN ACETATE 0.05 MILLIGRAM(S): 4 INJECTION INTRAVENOUS at 00:27

## 2025-06-03 RX ADMIN — INSULIN LISPRO 8 UNIT(S): 100 INJECTION, SOLUTION INTRAVENOUS; SUBCUTANEOUS at 12:22

## 2025-06-03 RX ADMIN — INSULIN LISPRO 8 UNIT(S): 100 INJECTION, SOLUTION INTRAVENOUS; SUBCUTANEOUS at 17:03

## 2025-06-03 RX ADMIN — INSULIN LISPRO 2: 100 INJECTION, SOLUTION INTRAVENOUS; SUBCUTANEOUS at 12:21

## 2025-06-03 RX ADMIN — DESMOPRESSIN ACETATE 0.05 MILLIGRAM(S): 4 INJECTION INTRAVENOUS at 09:58

## 2025-06-03 RX ADMIN — Medication 5000 UNIT(S): at 09:57

## 2025-06-03 RX ADMIN — Medication 5 MILLIGRAM(S): at 09:59

## 2025-06-03 RX ADMIN — TOPIRAMATE 100 MILLIGRAM(S): 25 TABLET, FILM COATED ORAL at 22:48
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
## 2025-06-03 NOTE — H&P ADULT - HISTORY OF PRESENT ILLNESS
HPI:  44 yo M PMHx craniopharyngioma, T2DM, diabetes insipidus, hypothyroid. Presenting to [facility] for 2 complaints, first he has been having persistent hyperglycemia for the past week glucose between 300-350. Pt has been urinating more frequently and had been fatigued. second issue patient is having is short term memory problems, Pt states that he recently has been very forgetful, for example yesterday pt forgot how to use his coffee machine that he has been using for years, Denies any extremity weakness, numbness, vision changes. In the ED VSS Cr 1.51, glucose 288. Ct head negative.     PAST MEDICAL & SURGICAL HISTORY:  PE (pulmonary thromboembolism)      DVT (deep venous thrombosis)      DM (diabetes mellitus)      Diabetes insipidus  (pt denies)      HLD (hyperlipidemia)      THOMAS (obstructive sleep apnea)      Hypothyroid      Intractable tension-type headache      Brain tumor      S/P craniotomy  x2      S/P IVC filter        FAMILY HISTORY:    Social History:      Allergies    No Known Allergies    Intolerances        MEDICATIONS  (STANDING):  ascorbic acid 500 milliGRAM(s) Oral daily  aspirin enteric coated 81 milliGRAM(s) Oral daily  atorvastatin 40 milliGRAM(s) Oral at bedtime  cholecalciferol 5000 Unit(s) Oral daily  desmopressin 0.05 milliGRAM(s) Oral two times a day  dextrose 5%. 1000 milliLiter(s) (50 mL/Hr) IV Continuous <Continuous>  dextrose 5%. 1000 milliLiter(s) (100 mL/Hr) IV Continuous <Continuous>  dextrose 50% Injectable 25 Gram(s) IV Push once  dextrose 50% Injectable 12.5 Gram(s) IV Push once  dextrose 50% Injectable 25 Gram(s) IV Push once  divalproex  milliGRAM(s) Oral once  divalproex  milliGRAM(s) Oral at bedtime  ferrous    sulfate 325 milliGRAM(s) Oral daily  glucagon  Injectable 1 milliGRAM(s) IntraMuscular once  hydrocortisone 5 milliGRAM(s) Oral two times a day  insulin glargine Injectable (LANTUS) 15 Unit(s) SubCutaneous at bedtime  insulin lispro (ADMELOG) corrective regimen sliding scale   SubCutaneous three times a day before meals  insulin lispro (ADMELOG) corrective regimen sliding scale   SubCutaneous at bedtime  insulin lispro Injectable (ADMELOG) 10 Unit(s) SubCutaneous three times a day before meals  levothyroxine 150 MICROGram(s) Oral daily  metoclopramide 5 milliGRAM(s) Oral three times a day  metoprolol tartrate 25 milliGRAM(s) Oral two times a day  pantoprazole    Tablet 40 milliGRAM(s) Oral before breakfast  potassium chloride    Tablet ER 10 milliEquivalent(s) Oral daily  rivaroxaban 20 milliGRAM(s) Oral daily  sucralfate 2 Gram(s) Oral <User Schedule>  tamsulosin 0.8 milliGRAM(s) Oral at bedtime  topiramate 100 milliGRAM(s) Oral two times a day  venlafaxine XR. 150 milliGRAM(s) Oral daily    MEDICATIONS  (PRN):  dextrose Oral Gel 15 Gram(s) Oral once PRN Blood Glucose LESS THAN 70 milliGRAM(s)/deciliter      ROS:  10 point ROS negative except for ones mentioned in HPI    PE   HEENT:  Head is normocephalic    Skin:  Warm and dry without any rash   NECK:  Supple without lymphadenopathy.   HEART:  Regular rate and rhythm. normal S1 and S2, No M/R/G  LUNGS:  Good ins/exp effort, no W/R/R/C  ABDOMEN:  Soft, nontender, nondistended with good bowel sounds heard  EXTREMITIES:  Without cyanosis, clubbing or edema.   NEUROLOGICAL:  Gross nonfocal      PEx  T(C): 36.7 (06-02-25 @ 17:18), Max: 36.7 (06-02-25 @ 17:18)  HR: 84 (06-02-25 @ 17:18) (84 - 84)  BP: 112/60 (06-02-25 @ 17:18) (112/60 - 112/60)  RR: 18 (06-02-25 @ 17:18) (18 - 18)  SpO2: 96% (06-02-25 @ 17:18) (96% - 96%)  Wt(kg): --                        17.3   5.38  )-----------( 142      ( 02 Jun 2025 17:57 )             50.7     06-02    133[L]  |  101  |  15  ----------------------------<  268[H]  3.6   |  25  |  1.51[H]    Ca    9.5      02 Jun 2025 17:57    TPro  7.1  /  Alb  3.7  /  TBili  0.6  /  DBili  x   /  AST  27  /  ALT  61  /  AlkPhos  88  06-02    CAPILLARY BLOOD GLUCOSE      POCT Blood Glucose.: 302 mg/dL (02 Jun 2025 17:10)      Urinalysis Basic - ( 02 Jun 2025 17:57 )    Color: x / Appearance: x / SG: x / pH: x  Gluc: 268 mg/dL / Ketone: x  / Bili: x / Urobili: x   Blood: x / Protein: x / Nitrite: x   Leuk Esterase: x / RBC: x / WBC x   Sq Epi: x / Non Sq Epi: x / Bacteria: x                Radiology/Imaging, I have personally reviewed:

## 2025-06-03 NOTE — H&P ADULT - ASSESSMENT
76 yo M PMHx craniopharyngioma, T2DM, diabetes insipidus, hypothyroid. Presenting to [facility] for 2 complaints, first he has been having persistent hyperglycemia for the past week glucose between 300-350. Pt has been urinating more frequently and had been fatigued. second issue patient is having is short term memory problems, Pt states that he recently has been very forgetful, for example yesterday pt forgot how to use his coffee machine that he has been using for years, Denies any extremity weakness, numbness, vision changes. In the ED VSS Cr 1.51, glucose 288. Ct head negative.       Assessment/Plan:    #Hyperglycemia 2/2 DM 2    - a1c  - accuchecks  - Lispro 8 u w meals, 15 u lantus    # Short term memory loss, new resting tremor    - CT head negative   - TSH, vitamin b12, ammonia   - MRI brain   - Neurology consult       # VADIM    - UA  - NS 1.5 L     # PMHx craniopharyngioma, T2DM, diabetes insipidus, hypothyroid  - cw home medications    Code status: Full   DVT ppx: [email or web address]

## 2025-06-03 NOTE — PHYSICAL THERAPY INITIAL EVALUATION ADULT - MODALITIES TREATMENT COMMENTS
pt left seated in w/c post Eval; pt being taken for MRI by issa Mckeon; pt instructed not to get up alone; call nursing for assist; evon well; denied pain

## 2025-06-03 NOTE — PROGRESS NOTE ADULT - SUBJECTIVE AND OBJECTIVE BOX
HOSPITALIST PROGRESS NOTE      HPI - 46 yo M PMHx craniopharyngioma, T2DM, diabetes insipidus, hypothyroid. Presenting to [facility] for 2 complaints, first he has been having persistent hyperglycemia for the past week glucose between 300-350. Pt has been urinating more frequently and had been fatigued. second issue patient is having is short term memory problems, Pt states that he recently has been very forgetful, for example yesterday pt forgot how to use his coffee machine that he has been using for years, Denies any extremity weakness, numbness, vision changes. In the ED VSS Cr 1.51, glucose 288. Ct head negative.    6/3 - pt A&Ox4, VSS. no complaints other than recent short term memory loss and thirst, reports good PO intake and hydration at home      VITALS  Vital Signs Last 24 Hrs  T(C): 36.5 (03 Jun 2025 11:36), Max: 36.7 (02 Jun 2025 17:18)  T(F): 97.7 (03 Jun 2025 11:36), Max: 98.1 (02 Jun 2025 17:18)  HR: 82 (03 Jun 2025 11:36) (82 - 87)  BP: 106/57 (03 Jun 2025 11:36) (106/57 - 123/55)  BP(mean): 66 (03 Jun 2025 01:42) (66 - 75)  RR: 18 (03 Jun 2025 11:36) (17 - 18)  SpO2: 95% (03 Jun 2025 11:36) (94% - 97%)    Parameters below as of 03 Jun 2025 11:36  Patient On (Oxygen Delivery Method): room air        CAPILLARY BLOOD GLUCOSE      POCT Blood Glucose.: 227 mg/dL (03 Jun 2025 11:55)  POCT Blood Glucose.: 204 mg/dL (03 Jun 2025 07:53)  POCT Blood Glucose.: 302 mg/dL (02 Jun 2025 17:10)      PHYSICAL EXAM  General: NAD, sitting comfortably in bed   HEENT: EOMI, no scleral icterus, dry MM  Respiratory: lungs CTA b/l, no wheezes/crackles, no accessory muscle use  Cardiovascular: Regular rhythm/rate; +S1 +S2  Gastrointestinal: Soft, NTND  Extremities: WWP, no cyanosis, no edema, pulses equal  Neurological: A&Ox3, no gross focal deficits, follows commands  Skin: Normal temperature, warm, dry    MEDICATIONS  (STANDING):  ascorbic acid 500 milliGRAM(s) Oral daily  aspirin enteric coated 81 milliGRAM(s) Oral daily  atorvastatin 40 milliGRAM(s) Oral at bedtime  cholecalciferol 5000 Unit(s) Oral daily  desmopressin 0.05 milliGRAM(s) Oral two times a day  dextrose 5%. 1000 milliLiter(s) (50 mL/Hr) IV Continuous <Continuous>  dextrose 5%. 1000 milliLiter(s) (100 mL/Hr) IV Continuous <Continuous>  dextrose 50% Injectable 25 Gram(s) IV Push once  dextrose 50% Injectable 12.5 Gram(s) IV Push once  dextrose 50% Injectable 25 Gram(s) IV Push once  divalproex  milliGRAM(s) Oral at bedtime  ferrous    sulfate 325 milliGRAM(s) Oral daily  glucagon  Injectable 1 milliGRAM(s) IntraMuscular once  hydrocortisone 5 milliGRAM(s) Oral two times a day  insulin glargine Injectable (LANTUS) 15 Unit(s) SubCutaneous at bedtime  insulin lispro (ADMELOG) corrective regimen sliding scale   SubCutaneous three times a day before meals  insulin lispro (ADMELOG) corrective regimen sliding scale   SubCutaneous at bedtime  insulin lispro Injectable (ADMELOG) 8 Unit(s) SubCutaneous three times a day before meals  levothyroxine 150 MICROGram(s) Oral daily  metoclopramide 5 milliGRAM(s) Oral three times a day  metoprolol tartrate 25 milliGRAM(s) Oral two times a day  pantoprazole    Tablet 40 milliGRAM(s) Oral before breakfast  potassium chloride    Tablet ER 10 milliEquivalent(s) Oral daily  rivaroxaban 20 milliGRAM(s) Oral daily  sodium chloride 0.9%. 1000 milliLiter(s) (100 mL/Hr) IV Continuous <Continuous>  sucralfate 2 Gram(s) Oral <User Schedule>  tamsulosin 0.8 milliGRAM(s) Oral at bedtime  topiramate 100 milliGRAM(s) Oral two times a day  venlafaxine XR. 150 milliGRAM(s) Oral daily    MEDICATIONS  (PRN):  dextrose Oral Gel 15 Gram(s) Oral once PRN Blood Glucose LESS THAN 70 milliGRAM(s)/deciliter      No Known Allergies      LABS                        16.2   4.74  )-----------( 137      ( 03 Jun 2025 11:12 )             47.6     06-03    133[L]  |  105  |  12  ----------------------------<  236[H]  4.2   |  25  |  1.38[H]    Ca    8.5      03 Jun 2025 11:12    TPro  7.1  /  Alb  3.7  /  TBili  0.6  /  DBili  x   /  AST  27  /  ALT  61  /  AlkPhos  88  06-02      Urinalysis Basic - ( 03 Jun 2025 11:12 )    Color: x / Appearance: x / SG: x / pH: x  Gluc: 236 mg/dL / Ketone: x  / Bili: x / Urobili: x   Blood: x / Protein: x / Nitrite: x   Leuk Esterase: x / RBC: x / WBC x   Sq Epi: x / Non Sq Epi: x / Bacteria: x            RADIOLOGY & ADDITIONAL TESTS: Reviewed

## 2025-06-03 NOTE — PATIENT PROFILE ADULT - FALL HARM RISK - RISK INTERVENTIONS

## 2025-06-03 NOTE — CONSULT NOTE ADULT - ASSESSMENT
74 year old M with PMHx of DM, DI, HLD, DVT/PE, Craniopharyngioma. status-post crani with resection in 2014 and 2015, seizure d/o, HA's on depakote and Topiramate, and mild cognitive impairment, recently seen by Dr. Walton 5/13 outpatient for follow up, presents  to [facility] 6/2 for persistent hyperglycemia for the past week glucose between 300-350, associated with short term memory problems and confusion.  Denies any focal weakness, numbness, diplopia, facial droop, dysarthria, HA.  In the ED VSS Cr 1.51, glucose 288. Ct head negative for acute findings. In the ED patient was found to be possibly dehydrated and hyperglycemic.  Neurology is consulted for memory loss, tremor.  As per patient, he denies any worsening or new tremor, and feels back to baseline.  Patient denies tongue bite, LOC, incontinence.  Of note patient has had neg MRI in the past.  EEG in the past showed focal slowing in the R temporal/parietal region, potentially structural in nature, no epileptiform discharges/seizures.  On exam, no focal deficits.      #confusion, short term memory loss in the setting of hyperglycemia and VADIM/possible dehydration     #H/O mild cognitive dysfunction     #h/o seizure d/o     #h/o craniopharyngioma s/p crani with resection 2015, 2015      Recommendations:  -Check routine EEG-will follow up  -continue Topiramate and Depakote at current doses  -Seizure precautions  -Treat underlying metabolic instabilities, and check for infection  -PT eval  -F/U with Neurology outpatient    D/W Dr. Gould, Dr. Sellers, patient

## 2025-06-03 NOTE — CONSULT NOTE ADULT - NS ATTEND AMEND GEN_ALL_CORE FT
Outpatient records reviewed.  He has been followed by Dr. Walton since 2020 for headaches and was on Topamax 50 mg BID.  In February 2021 there was an event suspicious for a seizure. He was started on Keppra which was eventually tapered off and topiramate was increased to 100 mg BID.  In May 2021 he was admitted to [facility] with severe headaches and Depakote 500 mg hs was started.  In August 2024 his wife reported an episode of confusion which improved.  In November 2024 he was diagnosed with mild cognitive impairment. He was felt to be stable during a visit in May 2025.    -Agree with routine EEG  -Check vitamin B12 level  -Hydration  -If no improvement can consider MRI brain

## 2025-06-03 NOTE — EEG REPORT - NS EEG TEXT BOX
[facility]  REPORT OF ROUTINE EEG    [address] [place], [place] [id number]    Patient Name: SANTA MOLINA    Age: 75 year, : 1950  MRN #: [id number]  Referring Physician: ERON  EEG #:     Study Date: 6/3/2025     Study Information:    EEG Recording Technique:  The patient underwent routine EEG using Telemetry System hardware on the XLTek Digital System.  EEG data was stored on a computer hard drive.     EEG Placement and Labeling of Electrodes:  The EEG was performed utilizing 16  channel referential EEG connections (coronal over temporal over parasagittal montage) using all standard 10-20 electrode placements with EKG.  Recording was at a sampling rate of 256 samples per second per channel.    History:  75 year old man with increased confusion and a remote history of seizures.      Medication  METOPROLOL    LEVOTHYROXINE    ATORVASTATIN    VENLAFAXINE    TOPIRAMATE    HYDROCORTISONE    DIVALPROEX    ASPIRIN    RIVAROXABAN      Interpretation:    -[phone]    FINDINGS:  The background was continuous, spontaneously variable and reactive.  During wakefulness, the posteriorly dominant alpha rhythm consisted of symmetric, well-modulated 7 Hz activity, with amplitude to 40 uV, that attenuated to eye opening.  Low amplitude frontal beta was noted in wakefulness.    Background Slowing:  Mild generalized background slowing was present.    Focal Slowing:   None was present.    Sleep Background:  Drowsiness was characterized by fragmentation, attenuation, and slowing of the background activity.    Stage 2 Sleep was not captured.     Other Non-Epileptiform Findings:  None were present.    Interictal Epileptiform Activity:   None were present.    Activation Procedures:   Hyperventilation was not performed.  Photic stimulation was performed and did not induce any abnormalities.     Artifacts:  Intermittent myogenic and movement artifacts were noted.    ECG:   The single channel ECG recording did not show any significant arrhythmias.      EEG Summary/Classification:  This was an abnormal EEG study in the awake and drowsy states due to the presence of mild generalized slowing.    EEG Clinical Correlate/Impression:  The findings are suggestive of diffuse cerebral dysfunction. No epileptiform activity was seen and no clinical events or seizures were recorded. Consider a repeat study if clinically indicated.       ________________________________________  Colleen Gould MD  Attending Physician  Director of Epilepsy at [facility]

## 2025-06-04 ENCOUNTER — TRANSCRIPTION ENCOUNTER (OUTPATIENT)
Age: 75
End: 2025-06-04

## 2025-06-04 VITALS
HEART RATE: 77 BPM | DIASTOLIC BLOOD PRESSURE: 70 MMHG | RESPIRATION RATE: 18 BRPM | TEMPERATURE: 98 F | OXYGEN SATURATION: 96 % | SYSTOLIC BLOOD PRESSURE: 116 MMHG

## 2025-06-04 LAB
ANION GAP SERPL CALC-SCNC: 5 MMOL/L — SIGNIFICANT CHANGE UP (ref 5–17)
BUN SERPL-MCNC: 11 MG/DL — SIGNIFICANT CHANGE UP (ref 7–23)
CALCIUM SERPL-MCNC: 8.3 MG/DL — LOW (ref 8.5–10.1)
CHLORIDE SERPL-SCNC: 106 MMOL/L — SIGNIFICANT CHANGE UP (ref 96–108)
CO2 SERPL-SCNC: 23 MMOL/L — SIGNIFICANT CHANGE UP (ref 22–31)
CREAT SERPL-MCNC: 1.3 MG/DL — SIGNIFICANT CHANGE UP (ref 0.5–1.3)
EGFR: 58 ML/MIN/1.73M2 — LOW
EGFR: 58 ML/MIN/1.73M2 — LOW
GLUCOSE BLDC GLUCOMTR-MCNC: 191 MG/DL — HIGH (ref 70–99)
GLUCOSE BLDC GLUCOMTR-MCNC: 235 MG/DL — HIGH (ref 70–99)
GLUCOSE SERPL-MCNC: 199 MG/DL — HIGH (ref 70–99)
POTASSIUM SERPL-MCNC: 3.8 MMOL/L — SIGNIFICANT CHANGE UP (ref 3.5–5.3)
POTASSIUM SERPL-SCNC: 3.8 MMOL/L — SIGNIFICANT CHANGE UP (ref 3.5–5.3)
SODIUM SERPL-SCNC: 134 MMOL/L — LOW (ref 135–145)

## 2025-06-04 PROCEDURE — 99232 SBSQ HOSP IP/OBS MODERATE 35: CPT

## 2025-06-04 PROCEDURE — 99239 HOSP IP/OBS DSCHRG MGMT >30: CPT

## 2025-06-04 RX ORDER — CYANOCOBALAMIN 1000 UG/ML
1 INJECTION INTRAMUSCULAR; SUBCUTANEOUS
Qty: 30 | Refills: 2
Start: 2025-06-04 | End: 2025-09-01

## 2025-06-04 RX ORDER — CYANOCOBALAMIN 1000 UG/ML
1000 INJECTION INTRAMUSCULAR; SUBCUTANEOUS EVERY 24 HOURS
Refills: 0 | Status: DISCONTINUED | OUTPATIENT
Start: 2025-06-04 | End: 2025-06-04

## 2025-06-04 RX ADMIN — Medication 81 MILLIGRAM(S): at 09:39

## 2025-06-04 RX ADMIN — Medication 5000 UNIT(S): at 09:35

## 2025-06-04 RX ADMIN — Medication 10 MILLIEQUIVALENT(S): at 09:35

## 2025-06-04 RX ADMIN — INSULIN LISPRO 8 UNIT(S): 100 INJECTION, SOLUTION INTRAVENOUS; SUBCUTANEOUS at 11:58

## 2025-06-04 RX ADMIN — Medication 40 MILLIGRAM(S): at 06:31

## 2025-06-04 RX ADMIN — RIVAROXABAN 20 MILLIGRAM(S): 10 TABLET, FILM COATED ORAL at 09:36

## 2025-06-04 RX ADMIN — CYANOCOBALAMIN 1000 MICROGRAM(S): 1000 INJECTION INTRAMUSCULAR; SUBCUTANEOUS at 11:59

## 2025-06-04 RX ADMIN — INSULIN LISPRO 8 UNIT(S): 100 INJECTION, SOLUTION INTRAVENOUS; SUBCUTANEOUS at 08:09

## 2025-06-04 RX ADMIN — Medication 150 MICROGRAM(S): at 06:31

## 2025-06-04 RX ADMIN — Medication 5 MILLIGRAM(S): at 13:09

## 2025-06-04 RX ADMIN — INSULIN LISPRO 1: 100 INJECTION, SOLUTION INTRAVENOUS; SUBCUTANEOUS at 08:09

## 2025-06-04 RX ADMIN — VENLAFAXINE HYDROCHLORIDE 150 MILLIGRAM(S): 37.5 CAPSULE, EXTENDED RELEASE ORAL at 09:38

## 2025-06-04 RX ADMIN — Medication 500 MILLIGRAM(S): at 09:37

## 2025-06-04 RX ADMIN — Medication 325 MILLIGRAM(S): at 09:37

## 2025-06-04 RX ADMIN — INSULIN LISPRO 2: 100 INJECTION, SOLUTION INTRAVENOUS; SUBCUTANEOUS at 11:58

## 2025-06-04 RX ADMIN — Medication 5 MILLIGRAM(S): at 06:32

## 2025-06-04 RX ADMIN — DESMOPRESSIN ACETATE 0.05 MILLIGRAM(S): 4 INJECTION INTRAVENOUS at 09:37

## 2025-06-04 RX ADMIN — TOPIRAMATE 100 MILLIGRAM(S): 25 TABLET, FILM COATED ORAL at 09:37

## 2025-06-04 RX ADMIN — Medication 1000 MILLILITER(S): at 13:10

## 2025-06-04 RX ADMIN — METOPROLOL SUCCINATE 25 MILLIGRAM(S): 50 TABLET, EXTENDED RELEASE ORAL at 09:36

## 2025-06-04 RX ADMIN — Medication 2 GRAM(S): at 08:09

## 2025-06-04 RX ADMIN — INSULIN GLARGINE-YFGN 20 UNIT(S): 100 INJECTION, SOLUTION SUBCUTANEOUS at 11:59

## 2025-06-04 RX ADMIN — Medication 5 MILLIGRAM(S): at 09:37

## 2025-06-04 NOTE — DISCHARGE NOTE NURSING/CASE MANAGEMENT/SOCIAL WORK - PATIENT PORTAL LINK FT
You can access the FollowMyHealth Patient Portal offered by [facility] by registering at the following website: [email or web address]. By joining [employer]’s FollowMyHealth portal, you will also be able to view your health information using other applications (apps) compatible with our system.

## 2025-06-04 NOTE — DISCHARGE NOTE NURSING/CASE MANAGEMENT/SOCIAL WORK - NSDCVIVACCINE_GEN_ALL_CORE_FT
Tdap; 20-Jan-2021 15:25; Ce Cedeno (RN); [employer]; [id number] (Exp. Date: 31-Jul-2022); IntraMuscular; Deltoid Right.; 0.5 milliLiter(s); VIS (VIS Published: 09-May-2013, VIS Presented: 20-Jan-2021);   Tdap; 14-Sep-2023 00:15; Maryjane Reilly (ROD); [employer]; [id number] (Exp. Date: 03-Oct-2025); IntraMuscular; Deltoid Right.; 0.5 milliLiter(s); VIS (VIS Published: 09-May-2013, VIS Presented: 14-Sep-2023);

## 2025-06-04 NOTE — DISCHARGE NOTE PROVIDER - CARE PROVIDER_API CALL
David Walton  Neurology  [address] [place] [address], [address] [place] [address]  Phone: [phone]  Fax: [phone]  Follow Up Time: 2 weeks

## 2025-06-04 NOTE — DISCHARGE NOTE NURSING/CASE MANAGEMENT/SOCIAL WORK - NSDCPEFALRISK_GEN_ALL_CORE
For information on Fall & Injury Prevention, visit: [email or web address] OR  [email or web address] OR  https://www.cdc.gov/steadi/patient.html

## 2025-06-04 NOTE — DISCHARGE NOTE NURSING/CASE MANAGEMENT/SOCIAL WORK - FINANCIAL ASSISTANCE
[facility] provides services at a reduced cost to those who are determined to be eligible through [facility]’s financial assistance program. Information regarding [facility]’s financial assistance program can be found by going to [email or web address] or by calling [phone].

## 2025-06-04 NOTE — CDI QUERY NOTE - NSCDIOTHERTXTBX_GEN_ALL_CORE_HH
Clinical documentation and/or evidence in the medical record indicates that this patient has altered mental status with return to mental status baseline without an associated diagnosis. In order to ensure accurate coding and accuracy of the clinical record, the documentation in this patient’s medical record requires additional clarification.      Please include documentation of a diagnosis associated with these findings in your progress note and/or discharge summary.      Link altered mental status to underlying cause (e.g. AMS secondary to _______).    Document etiology of the altered mental status such as:   •	Encephalopathy       o	Metabolic encephalopathy  •	Confusion due to persistent  mild cognitive dysfunction  •	Other (specify)      Supporting documentation and/or clinical evidence:       ED triage nurse on 5/29/2025:  Alert-The patient is alert, awake and responds to voice. The patient is oriented to time, place, and person. The triage nurse is able to obtain subjective information.          ED provider documentation on 6/2/2025:  DISPOSITION:   Principal Discharge DX:	Altered mental status.    · Clinical Summary  (MDM): Summarize the clinical encounter  75 year old male with elevated blood sugars and some confusion for the last several days. seen at [facility] with an extensive workup including CT scan showed no cause for confusion or elevated sugars. will check labs, urine, and reassess.        H&P documentation on 6/3/2025:  44 yo M PMHx craniopharyngioma, T2DM, diabetes insipidus, hypothyroid. Presenting to [facility] for 2 complaints, first he has been having persistent hyperglycemia for the past week glucose between 300-350. Pt has been urinating more frequently and had been fatigued. second issue patient is having is short term memory problems, Pt states that he recently has been very forgetful, for example yesterday pt forgot how to use his coffee machine that he has been using for years, Denies any extremity weakness, numbness, vision changes. In the ED VSS Cr 1.51, glucose 288      #Hyperglycemia 2/2 DM 2    # Short term memory loss, new resting tremor    # VADIM        Neurology documentation on 6/4/2025:  Short term memory loss, confusion:  -He has been diagnosed with mild cognitive dysfunction  -EEG shows mild generalized slowing, a non-specific finding. No evidence of epileptiform activity.  -MRI brain is stable  -HbA1C is 9.4.   -Has some dehydration.    He has some improvement with hydration.  Needs better glucose control.        For reference, please see the [facility] Provider Encephalopathy Clinical Paper located on the [facility] Intranet - Clinical documentation improvement resources.

## 2025-06-04 NOTE — PROGRESS NOTE ADULT - ASSESSMENT
74 yo M PMHx craniopharyngioma, T2DM, diabetes insipidus, hypothyroid. Presenting to [facility] for 2 complaints, first he has been having persistent hyperglycemia for the past week glucose between 300-350. Pt has been urinating more frequently and had been fatigued. second issue patient is having is short term memory problems, Pt states that he recently has been very forgetful, for example yesterday pt forgot how to use his coffee machine that he has been using for years, Denies any extremity weakness, numbness, vision changes. In the ED VSS Cr 1.51, glucose 288. Ct head negative.       Assessment/Plan:    #Hyperglycemia 2/2 DM 2  - A1c pending  - ok to continue w current insulin regimen inpatient  - long term goal requires fu with endo which he has established care with     # Short term memory loss, new resting tremor  - may be related to high FS?   - CT head negative   - ammonia wnl  - b12 pending  - TSH <0.01 w normal free thyroxine and T4 which per chart review is her baseline likely 2/2 steroid use   - MRI brain   - Neurology consult , EEG given hx of seizure       # VADIM vs worsening CKD  - Cr 1.5 on admission down to 1.3 s/p IVF  - prior Cr on record vary from 1.2 - 1.7  - clinically pt appears volume down so will cw IVF today     # PMHx craniopharyngioma, T2DM, diabetes insipidus, hypothyroid  - cw home medications    Code status: Full   DVT ppx: xeralto     #dispo - EEG, c/w IVF, likely DC tomorrow w outpt endo eval (established care) 
 74 year old M with PMHx of DM, DI, HLD, DVT/PE, Craniopharyngioma. status-post crani with resection in 2014 and 2015, seizure d/o, HA's on depakote and Topiramate, and mild cognitive impairment, recently seen by Dr. Walton 5/13 outpatient for follow up, presents  to [facility] 6/2 for persistent hyperglycemia for the past week glucose between 300-350, associated with short term memory problems and confusion.  Denies any focal weakness, numbness, diplopia, facial droop, dysarthria, HA.  In the ED VSS Cr 1.51, glucose 288. Ct head negative for acute findings. In the ED patient was found to be possibly dehydrated and hyperglycemic.  Neurology is consulted for memory loss, tremor.  As per patient, he denies any worsening or new tremor, and feels back to baseline.  Patient denies tongue bite, LOC, incontinence.      Short term memory loss, confusion:  -He has been diagnosed with mild cognitive dysfunction  -EEG shows mild generalized slowing, a non-specific finding. No evidence of epileptiform activity.  -MRI brain is stable  -HbA1C is 9.4.   -Has some dehydration.    He has some improvement with hydration.  Needs better glucose control.    will f/u as if needed

## 2025-06-04 NOTE — PROGRESS NOTE ADULT - SUBJECTIVE AND OBJECTIVE BOX
Interval History:  6/4/25: He does not have any complaints at this time.    MEDICATIONS  (STANDING):  ascorbic acid 500 milliGRAM(s) Oral daily  aspirin enteric coated 81 milliGRAM(s) Oral daily  atorvastatin 40 milliGRAM(s) Oral at bedtime  cholecalciferol 5000 Unit(s) Oral daily  desmopressin 0.05 milliGRAM(s) Oral two times a day  dextrose 5%. 1000 milliLiter(s) (50 mL/Hr) IV Continuous <Continuous>  dextrose 5%. 1000 milliLiter(s) (100 mL/Hr) IV Continuous <Continuous>  dextrose 50% Injectable 25 Gram(s) IV Push once  dextrose 50% Injectable 12.5 Gram(s) IV Push once  dextrose 50% Injectable 25 Gram(s) IV Push once  divalproex  milliGRAM(s) Oral at bedtime  ferrous    sulfate 325 milliGRAM(s) Oral daily  glucagon  Injectable 1 milliGRAM(s) IntraMuscular once  hydrocortisone 5 milliGRAM(s) Oral two times a day  insulin glargine Injectable (LANTUS) 20 Unit(s) SubCutaneous before breakfast  insulin lispro (ADMELOG) corrective regimen sliding scale   SubCutaneous three times a day before meals  insulin lispro (ADMELOG) corrective regimen sliding scale   SubCutaneous at bedtime  insulin lispro Injectable (ADMELOG) 8 Unit(s) SubCutaneous three times a day before meals  levothyroxine 150 MICROGram(s) Oral daily  metoclopramide 5 milliGRAM(s) Oral three times a day  metoprolol tartrate 25 milliGRAM(s) Oral two times a day  pantoprazole    Tablet 40 milliGRAM(s) Oral before breakfast  potassium chloride    Tablet ER 10 milliEquivalent(s) Oral daily  rivaroxaban 20 milliGRAM(s) Oral daily  sodium chloride 0.9%. 1000 milliLiter(s) (100 mL/Hr) IV Continuous <Continuous>  sucralfate 2 Gram(s) Oral <User Schedule>  tamsulosin 0.8 milliGRAM(s) Oral at bedtime  topiramate 100 milliGRAM(s) Oral two times a day  venlafaxine XR. 150 milliGRAM(s) Oral daily    MEDICATIONS  (PRN):  dextrose Oral Gel 15 Gram(s) Oral once PRN Blood Glucose LESS THAN 70 milliGRAM(s)/deciliter      Allergies    No Known Allergies    Intolerances        PHYSICAL EXAM:  Vital Signs Last 24 Hrs  T(F): 98.1 (06-04-25 @ 09:00)  HR: 75 (06-04-25 @ 09:00)  BP: 106/67 (06-04-25 @ 09:00)  RR: 18 (06-04-25 @ 09:00)    GENERAL: NAD, well-groomed, well-developed  HEAD:  Atraumatic, Normocephalic  Neuro:  Awake, alert, no aphasia  Oriented to person, place, date  CN: PERRL, EOMI, no nystagmus, no facial weakness, tongue protrudes in the midline  motor: normal tone, no pronator drift, full strength in upper extremities, lower extremities 5-/5  sensory: intact to light touch  coordination: finger to nose intact bilaterally      LABS:                        16.2   4.74  )-----------( 137      ( 03 Jun 2025 11:12 )             47.6     06-04    134[L]  |  106  |  11  ----------------------------<  199[H]  3.8   |  23  |  1.30    Ca    8.3[L]      04 Jun 2025 07:10    TPro  7.1  /  Alb  3.7  /  TBili  0.6  /  DBili  x   /  AST  27  /  ALT  61  /  AlkPhos  88  06-02      Urinalysis Basic - ( 04 Jun 2025 07:10 )    Color: x / Appearance: x / SG: x / pH: x  Gluc: 199 mg/dL / Ketone: x  / Bili: x / Urobili: x   Blood: x / Protein: x / Nitrite: x   Leuk Esterase: x / RBC: x / WBC x   Sq Epi: x / Non Sq Epi: x / Bacteria: x        RADIOLOGY & ADDITIONAL STUDIES:         Interval History:  6/4/25: He does not have any complaints at this time.    MEDICATIONS  (STANDING):  ascorbic acid 500 milliGRAM(s) Oral daily  aspirin enteric coated 81 milliGRAM(s) Oral daily  atorvastatin 40 milliGRAM(s) Oral at bedtime  cholecalciferol 5000 Unit(s) Oral daily  desmopressin 0.05 milliGRAM(s) Oral two times a day  dextrose 5%. 1000 milliLiter(s) (50 mL/Hr) IV Continuous <Continuous>  dextrose 5%. 1000 milliLiter(s) (100 mL/Hr) IV Continuous <Continuous>  dextrose 50% Injectable 25 Gram(s) IV Push once  dextrose 50% Injectable 12.5 Gram(s) IV Push once  dextrose 50% Injectable 25 Gram(s) IV Push once  divalproex  milliGRAM(s) Oral at bedtime  ferrous    sulfate 325 milliGRAM(s) Oral daily  glucagon  Injectable 1 milliGRAM(s) IntraMuscular once  hydrocortisone 5 milliGRAM(s) Oral two times a day  insulin glargine Injectable (LANTUS) 20 Unit(s) SubCutaneous before breakfast  insulin lispro (ADMELOG) corrective regimen sliding scale   SubCutaneous three times a day before meals  insulin lispro (ADMELOG) corrective regimen sliding scale   SubCutaneous at bedtime  insulin lispro Injectable (ADMELOG) 8 Unit(s) SubCutaneous three times a day before meals  levothyroxine 150 MICROGram(s) Oral daily  metoclopramide 5 milliGRAM(s) Oral three times a day  metoprolol tartrate 25 milliGRAM(s) Oral two times a day  pantoprazole    Tablet 40 milliGRAM(s) Oral before breakfast  potassium chloride    Tablet ER 10 milliEquivalent(s) Oral daily  rivaroxaban 20 milliGRAM(s) Oral daily  sodium chloride 0.9%. 1000 milliLiter(s) (100 mL/Hr) IV Continuous <Continuous>  sucralfate 2 Gram(s) Oral <User Schedule>  tamsulosin 0.8 milliGRAM(s) Oral at bedtime  topiramate 100 milliGRAM(s) Oral two times a day  venlafaxine XR. 150 milliGRAM(s) Oral daily    MEDICATIONS  (PRN):  dextrose Oral Gel 15 Gram(s) Oral once PRN Blood Glucose LESS THAN 70 milliGRAM(s)/deciliter      Allergies    No Known Allergies    Intolerances        PHYSICAL EXAM:  Vital Signs Last 24 Hrs  T(F): 98.1 (06-04-25 @ 09:00)  HR: 75 (06-04-25 @ 09:00)  BP: 106/67 (06-04-25 @ 09:00)  RR: 18 (06-04-25 @ 09:00)    GENERAL: NAD, well-groomed, well-developed  HEAD:  Atraumatic, Normocephalic  Neuro:  Awake, alert, no aphasia  Oriented to person, place, date  CN: PERRL, EOMI, no nystagmus, no facial weakness, tongue protrudes in the midline  motor: normal tone, no pronator drift, full strength in upper extremities, lower extremities 5-/5  sensory: intact to light touch  coordination: finger to nose intact bilaterally      LABS:                        16.2   4.74  )-----------( 137      ( 03 Jun 2025 11:12 )             47.6     06-04    134[L]  |  106  |  11  ----------------------------<  199[H]  3.8   |  23  |  1.30    Ca    8.3[L]      04 Jun 2025 07:10    TPro  7.1  /  Alb  3.7  /  TBili  0.6  /  DBili  x   /  AST  27  /  ALT  61  /  AlkPhos  88  06-02      Urinalysis Basic - ( 04 Jun 2025 07:10 )    Color: x / Appearance: x / SG: x / pH: x  Gluc: 199 mg/dL / Ketone: x  / Bili: x / Urobili: x   Blood: x / Protein: x / Nitrite: x   Leuk Esterase: x / RBC: x / WBC x   Sq Epi: x / Non Sq Epi: x / Bacteria: x        RADIOLOGY & ADDITIONAL STUDIES:  CT head 6/2/25:  1.  Stable postsurgical changes, without acute intracranial hemorrhage.    EEG 6/3/25: mild generalized slowing    MR head with and without contrast 6/3/25:  No acute intracranial hemorrhage or evidence of acute   ischemia.  Stable post surgical changes withoutevidence of residual or recurrent tumor.  Similar-appearing mild chronic white matter microvascular type changes.

## 2025-06-04 NOTE — DISCHARGE NOTE PROVIDER - HOSPITAL COURSE
#Discharge: do not delete    Patient is __ yo M/F with past medical history of _____ presented with _____, found to have _____ (one liner)    T(C): 36.7 (06-04-25 @ 09:00), Max: 36.7 (06-03-25 @ 21:56)  HR: 75 (06-04-25 @ 09:00) (72 - 81)  BP: 106/67 (06-04-25 @ 09:00) (106/67 - 131/70)  RR: 18 (06-04-25 @ 09:00) (18 - 18)  SpO2: 93% (06-04-25 @ 09:00) (93% - 96%)    Hospital course (by problem):     Patient was discharged to: (home/[facility]/acute rehab/hospice, etc, and with what services – home health PT/RN? Home O2?)    New medications:   Changes to old medications:  Medications that were stopped:    Items to follow up as outpatient:    Physical exam at the time of discharge:   General: NAD, sitting comfortably in bed   HEENT: PERRL/ EOMI, no scleral icterus, MMM  Respiratory: lungs CTA b/l, no wheezes/crackles, no accessory muscle use  Cardiovascular: Regular rhythm/rate; +S1 +S2  Gastrointestinal: Soft, NTND  Extremities: WWP, no cyanosis, no edema, pulses equal  Neurological: A&Ox3, no gross focal deficits, follows commands  Skin: Normal temperature, warm, dry #Discharge: do not delete    T(C): 36.7 (06-04-25 @ 09:00), Max: 36.7 (06-03-25 @ 21:56)  HR: 75 (06-04-25 @ 09:00) (72 - 81)  BP: 106/67 (06-04-25 @ 09:00) (106/67 - 131/70)  RR: 18 (06-04-25 @ 09:00) (18 - 18)  SpO2: 93% (06-04-25 @ 09:00) (93% - 96%)    Hospital course (by problem):     76 yo M PMHx craniopharyngioma, T2DM, diabetes insipidus, hypothyroid. Presenting to [facility] for 2 complaints, first he has been having persistent hyperglycemia for the past week glucose between 300-350. Pt has been urinating more frequently and had been fatigued. second issue patient is having is short term memory problems, Pt states that he recently has been very forgetful, for example yesterday pt forgot how to use his coffee machine that he has been using for years, Denies any extremity weakness, numbness, vision changes. In the ED VSS Cr 1.51, glucose 288. Ct head negative.       Assessment/Plan:    #Hyperglycemia 2/2 DM 2  - A1c 9  - ok to continue w current insulin regimen inpatient  - long term goal requires fu with endo which he has established care with. pt is on AM lantus , metformin 2000mg BID and mounjaro but low dose. WOuld not initiate new medications at this time as higher/uptirtraing mounjaro with outpatient endo should improve FS. FS 200s on admission without insulin and are not a dangerous level requiring new medication.     # Short term memory loss, new resting tremor  - may be related to high FS?   - CT head negative   - ammonia wnl  - b12 400, borderline ?> start b12  - TSH <0.01 w normal free thyroxine and T4 which per chart review is her baseline likely 2/2 steroid use , needs to fu with outpatient endo for lab monitoring   - MRI brain : N: No acute intracranial hemorrhage or evidence of acute ischemia. Stable post surgical changes without evidence of residual or recurrent   tumor. Similar-appearing mild chronic white matter microvascular type changes.  - EEG given hx of seizure : diffuse cerebral dysfunction. No epileptiform activity was seen and no clinical events or seizures were recorded  - Neurology consult appreciated > c/w work outpt with Dr. Peñaloza     # VADIM vs worsening CKD  - Cr 1.5 on admission down to 1.3 s/p IVF  - prior Cr on record vary from 1.2 - 1.7  - clinically pt appears volume down so will cw IVF today > CR down to 1.3    # PMHx craniopharyngioma, T2DM, diabetes insipidus, hypothyroid  - cw home medications      Patient was discharged to: home      #Discharge: do not delete    T(C): 36.7 (06-04-25 @ 09:00), Max: 36.7 (06-03-25 @ 21:56)  HR: 75 (06-04-25 @ 09:00) (72 - 81)  BP: 106/67 (06-04-25 @ 09:00) (106/67 - 131/70)  RR: 18 (06-04-25 @ 09:00) (18 - 18)  SpO2: 93% (06-04-25 @ 09:00) (93% - 96%)    Hospital course (by problem):     74 yo M PMHx craniopharyngioma, T2DM, diabetes insipidus, hypothyroid. Presenting to [facility] for 2 complaints, first he has been having persistent hyperglycemia for the past week glucose between 300-350. Pt has been urinating more frequently and had been fatigued. second issue patient is having is short term memory problems, Pt states that he recently has been very forgetful, for example yesterday pt forgot how to use his coffee machine that he has been using for years, Denies any extremity weakness, numbness, vision changes. In the ED VSS Cr 1.51, glucose 288. Ct head negative.       Assessment/Plan:    #Hyperglycemia 2/2 DM 2  - A1c 9  - ok to continue w current insulin regimen inpatient  - long term goal requires fu with endo which he has established care with. pt is on AM lantus , metformin 2000mg BID and mounjaro but low dose. WOuld not initiate new medications at this time as higher/uptirtraing mounjaro with outpatient endo should improve FS. FS 200s on admission without insulin and are not a dangerous level requiring new medication.     # Short term memory loss, new resting tremor  - may be related to high FS?   - pt A&Ox4 and can report episodes of confusion but has not been altered since admission  - CT head negative   - ammonia wnl  - b12 400, borderline low > start b12  - TSH <0.01 w normal free thyroxine and T4 which per chart review is her baseline likely 2/2 steroid use , needs to fu with outpatient endo for lab monitoring   - MRI brain : N: No acute intracranial hemorrhage or evidence of acute ischemia. Stable post surgical changes without evidence of residual or recurrent   tumor. Similar-appearing mild chronic white matter microvascular type changes.  - EEG given hx of seizure : diffuse cerebral dysfunction. No epileptiform activity was seen and no clinical events or seizures were recorded  - Neurology consult appreciated > c/w work outpt with Dr. Peñaloza     # VADIM vs worsening CKD  - Cr 1.5 on admission down to 1.3 s/p IVF  - prior Cr on record vary from 1.2 - 1.7  - clinically pt appears volume down so will cw IVF today > CR down to 1.3    # PMHx craniopharyngioma, T2DM, diabetes insipidus, hypothyroid  - cw home medications      Patient was discharged to: home

## 2025-06-04 NOTE — DISCHARGE NOTE PROVIDER - NSDCCPCAREPLAN_GEN_ALL_CORE_FT
PRINCIPAL DISCHARGE DIAGNOSIS  Diagnosis: Altered mental status  Assessment and Plan of Treatment: You presented with short term memory loss. You had a CT and MRI done which did not show a stroke. You were worked up for an infection which was negative. an EEG was done to rule out seizures, which did not reveal any seizure activity. Your b12 levels were borderline so continue a daily b12 supplement. Please continue your work up with your outpatient neurologist      SECONDARY DISCHARGE DIAGNOSES  Diagnosis: Hyperglycemia due to diabetes mellitus  Assessment and Plan of Treatment: You presented with high finger sticks. In the hospital, your blood sugar levels were 200s-300s even without additional insulin, your medications were reviewed. At [facility] time there is no indication to start pre-meal insulin or introduce new medications as your mounjaro dose will be uptitrated outpatient and this will help lower your blood sugar. Please follow up with your outpatient endocrinologist to adjust your medications.     PRINCIPAL DISCHARGE DIAGNOSIS  Diagnosis: Altered mental status  Assessment and Plan of Treatment: You presented with short term memory loss. You had a CT and MRI done which did not show a stroke. You were worked up for an infection which was negative. an EEG was done to rule out seizures, which did not reveal any seizure activity. Your b12 levels were borderline so continue a daily b12 supplement. Please continue your work up with your outpatient neurologist      SECONDARY DISCHARGE DIAGNOSES  Diagnosis: Hyperglycemia due to diabetes mellitus  Assessment and Plan of Treatment: You presented with high finger sticks. In the hospital, your blood sugar levels were 200s-300s even without additional insulin, your medications were reviewed. At [facility] time there is no indication to start pre-meal insulin or introduce new medications as your mounjaro dose will be uptitrated outpatient and this will help lower your blood sugar. Please follow up with your outpatient endocrinologist to adjust your medications.    Diagnosis: Abnormal finding on lung imaging  Assessment and Plan of Treatment: CT chest noted a calcified granuloma right lung base. Please follow up with your primary care physician for further monitoring.

## 2025-06-04 NOTE — DISCHARGE NOTE PROVIDER - NSDCMRMEDTOKEN_GEN_ALL_CORE_FT
Aspirin Enteric Coated 81 mg oral delayed release tablet: 1 tab(s) orally once a day  atorvastatin 40 mg oral tablet: 1 tab(s) orally once a day (at bedtime)  desmopressin 0.1 mg oral tablet: 0.5 tab(s) orally 2 times a day  dextroamphetamine-amphetamine 10 mg oral tablet: 1 tab(s) orally 1 to 2 times a day in the morning and afternoon  divalproex sodium 500 mg oral tablet, extended release: 1 tab(s) orally once a day (at bedtime)  ferrous sulfate 325 mg (65 mg elemental iron) oral tablet: 1 tab(s) orally once a day  Gemtesa 75 mg oral tablet: 1 tab(s) orally once a day  hydrocortisone 5 mg oral tablet: 3 tab(s) orally once a day (in the morning)  hydrocortisone 5 mg oral tablet: 2 tab(s) orally once a day (in the evening)  levothyroxine 150 mcg (0.15 mg) oral tablet: 1 tab(s) orally once a day  metFORMIN 500 mg oral tablet, extended release: 2 tab(s) orally 2 times a day  metoclopramide 10 mg oral tablet: 0.5 tab(s) orally 3 times a day  metoprolol tartrate 25 mg oral tablet: 1 tab(s) orally 2 times a day  Migrelief OTC supplement: 2 tabs by mouth once daily  Mounjaro 2.5 mg/0.5 mL subcutaneous solution: 2.5 milligram(s) subcutaneously once a week on Mondays  Omega-3 Fish Oil 1200 mg oral capsule: 1 cap(s) orally once a day  pantoprazole 40 mg oral delayed release tablet: 1 tab(s) orally 2 times a day  potassium chloride 10 mEq oral tablet, extended release: 1 tab(s) orally once a day  sucralfate 1 g oral tablet: 2 tab(s) orally once a day (in the morning)  sucralfate 1 g oral tablet: 1 tab(s) orally once a day (in the evening)  tamsulosin 0.4 mg oral capsule: 2 cap(s) orally once a day (at bedtime)  testosterone cypionate 200 mg/mL intramuscular solution: 0.75 milliliter(s) intramuscularly once a week on Saturday  topiramate 100 mg oral tablet: 1 tab(s) orally 2 times a day  Tresiba FlexTouch 100 units/mL subcutaneous solution: 25 unit(s) subcutaneous once a day (at bedtime)  venlafaxine 150 mg oral capsule, extended release: 1 cap(s) orally once a day  Vitamin C 500 mg oral tablet: 1 tab(s) orally once a day  Vitamin D3 5000 intl units (125 mcg) oral tablet: 1 tab(s) orally once a day  Xarelto 20 mg oral tablet: 1 tab(s) orally once a day (in the evening)   Aspirin Enteric Coated 81 mg oral delayed release tablet: 1 tab(s) orally once a day  atorvastatin 40 mg oral tablet: 1 tab(s) orally once a day (at bedtime)  cyanocobalamin 1000 mcg oral tablet: 1 tab(s) orally once a day  desmopressin 0.1 mg oral tablet: 0.5 tab(s) orally 2 times a day  dextroamphetamine-amphetamine 10 mg oral tablet: 1 tab(s) orally 1 to 2 times a day in the morning and afternoon  divalproex sodium 500 mg oral tablet, extended release: 1 tab(s) orally once a day (at bedtime)  ferrous sulfate 325 mg (65 mg elemental iron) oral tablet: 1 tab(s) orally once a day  Gemtesa 75 mg oral tablet: 1 tab(s) orally once a day  hydrocortisone 5 mg oral tablet: 3 tab(s) orally once a day (in the morning)  hydrocortisone 5 mg oral tablet: 2 tab(s) orally once a day (in the evening)  levothyroxine 150 mcg (0.15 mg) oral tablet: 1 tab(s) orally once a day  metFORMIN 500 mg oral tablet, extended release: 2 tab(s) orally 2 times a day  metoclopramide 10 mg oral tablet: 0.5 tab(s) orally 3 times a day  metoprolol tartrate 25 mg oral tablet: 1 tab(s) orally 2 times a day  Migrelief OTC supplement: 2 tabs by mouth once daily  Mounjaro 2.5 mg/0.5 mL subcutaneous solution: 2.5 milligram(s) subcutaneously once a week on Mondays  Omega-3 Fish Oil 1200 mg oral capsule: 1 cap(s) orally once a day  pantoprazole 40 mg oral delayed release tablet: 1 tab(s) orally 2 times a day  potassium chloride 10 mEq oral tablet, extended release: 1 tab(s) orally once a day  sucralfate 1 g oral tablet: 2 tab(s) orally once a day (in the morning)  sucralfate 1 g oral tablet: 1 tab(s) orally once a day (in the evening)  tamsulosin 0.4 mg oral capsule: 2 cap(s) orally once a day (at bedtime)  testosterone cypionate 200 mg/mL intramuscular solution: 0.75 milliliter(s) intramuscularly once a week on Saturday  topiramate 100 mg oral tablet: 1 tab(s) orally 2 times a day  Tresiba FlexTouch 100 units/mL subcutaneous solution: 25 unit(s) subcutaneous once a day (at bedtime)  venlafaxine 150 mg oral capsule, extended release: 1 cap(s) orally once a day  Vitamin C 500 mg oral tablet: 1 tab(s) orally once a day  Vitamin D3 5000 intl units (125 mcg) oral tablet: 1 tab(s) orally once a day  Xarelto 20 mg oral tablet: 1 tab(s) orally once a day (in the evening)

## 2025-06-07 ENCOUNTER — OFFICE (OUTPATIENT)
Dept: URBAN - METROPOLITAN AREA CLINIC 104 | Facility: CLINIC | Age: 75
Setting detail: OPHTHALMOLOGY
End: 2025-06-07
Payer: MEDICARE

## 2025-06-07 DIAGNOSIS — H01.005: ICD-10-CM

## 2025-06-07 DIAGNOSIS — H01.001: ICD-10-CM

## 2025-06-07 DIAGNOSIS — H01.002: ICD-10-CM

## 2025-06-07 DIAGNOSIS — H01.004: ICD-10-CM

## 2025-06-07 DIAGNOSIS — H16.223: ICD-10-CM

## 2025-06-07 PROCEDURE — 92012 INTRM OPH EXAM EST PATIENT: CPT | Mod: 25 | Performed by: OPTOMETRIST

## 2025-06-07 PROCEDURE — 68761 CLOSE TEAR DUCT OPENING: CPT | Mod: 50 | Performed by: OPTOMETRIST

## 2025-06-07 ASSESSMENT — CONFRONTATIONAL VISUAL FIELD TEST (CVF)
OS_FINDINGS: FULL
OD_FINDINGS: FULL

## 2025-06-07 ASSESSMENT — KERATOMETRY
OD_K1POWER_DIOPTERS: 43.16
OS_K1POWER_DIOPTERS: 3.77
OD_AXISANGLE_DEGREES: 104
OD_K2POWER_DIOPTERS: 44.12
OS_K2POWER_DIOPTERS: 43.89
OS_AXISANGLE_DEGREES: 051

## 2025-06-07 ASSESSMENT — REFRACTION_AUTOREFRACTION
OD_CYLINDER: -2.00
OS_CYLINDER: -1.25
OD_AXIS: 165
OS_SPHERE: +1.00
OD_SPHERE: +2.00
OS_AXIS: 085

## 2025-06-07 ASSESSMENT — VISUAL ACUITY
OS_BCVA: 20/30-2
OD_BCVA: 20/20-1

## 2025-06-07 ASSESSMENT — PUNCTA - ASSESSMENT
OD_PUNCTA: 3MON PLUG COL PLUG RLL
OS_PUNCTA: 3MON PLUG COL PLUG LLL

## 2025-06-07 ASSESSMENT — SUPERFICIAL PUNCTATE KERATITIS (SPK)
OD_SPK: 1+
OS_SPK: 1+

## 2025-06-07 ASSESSMENT — LID EXAM ASSESSMENTS
OS_BLEPHARITIS: LLL LUL 2+
OD_BLEPHARITIS: RLL RUL 2+

## 2025-06-08 LAB
CULTURE RESULTS: SIGNIFICANT CHANGE UP
CULTURE RESULTS: SIGNIFICANT CHANGE UP
SPECIMEN SOURCE: SIGNIFICANT CHANGE UP
SPECIMEN SOURCE: SIGNIFICANT CHANGE UP

## 2025-06-10 DIAGNOSIS — J98.4 OTHER DISORDERS OF LUNG: ICD-10-CM

## 2025-06-10 DIAGNOSIS — E11.22 TYPE 2 DIABETES MELLITUS WITH DIABETIC CHRONIC KIDNEY DISEASE: ICD-10-CM

## 2025-06-10 DIAGNOSIS — N17.9 ACUTE KIDNEY FAILURE, UNSPECIFIED: ICD-10-CM

## 2025-06-10 DIAGNOSIS — Z79.82 LONG TERM (CURRENT) USE OF ASPIRIN: ICD-10-CM

## 2025-06-10 DIAGNOSIS — Z79.890 HORMONE REPLACEMENT THERAPY: ICD-10-CM

## 2025-06-10 DIAGNOSIS — E11.65 TYPE 2 DIABETES MELLITUS WITH HYPERGLYCEMIA: ICD-10-CM

## 2025-06-10 DIAGNOSIS — G40.909 EPILEPSY, UNSPECIFIED, NOT INTRACTABLE, WITHOUT STATUS EPILEPTICUS: ICD-10-CM

## 2025-06-10 DIAGNOSIS — Z86.718 PERSONAL HISTORY OF OTHER VENOUS THROMBOSIS AND EMBOLISM: ICD-10-CM

## 2025-06-10 DIAGNOSIS — Z95.828 PRESENCE OF OTHER VASCULAR IMPLANTS AND GRAFTS: ICD-10-CM

## 2025-06-10 DIAGNOSIS — Z79.01 LONG TERM (CURRENT) USE OF ANTICOAGULANTS: ICD-10-CM

## 2025-06-10 DIAGNOSIS — E86.0 DEHYDRATION: ICD-10-CM

## 2025-06-10 DIAGNOSIS — Z79.84 LONG TERM (CURRENT) USE OF ORAL HYPOGLYCEMIC DRUGS: ICD-10-CM

## 2025-06-10 DIAGNOSIS — Z86.011 PERSONAL HISTORY OF BENIGN NEOPLASM OF THE BRAIN: ICD-10-CM

## 2025-06-10 DIAGNOSIS — E78.5 HYPERLIPIDEMIA, UNSPECIFIED: ICD-10-CM

## 2025-06-10 DIAGNOSIS — G47.33 OBSTRUCTIVE SLEEP APNEA (ADULT) (PEDIATRIC): ICD-10-CM

## 2025-06-10 DIAGNOSIS — Z86.711 PERSONAL HISTORY OF PULMONARY EMBOLISM: ICD-10-CM

## 2025-06-10 DIAGNOSIS — Z79.85 LONG-TERM (CURRENT) USE OF INJECTABLE NON-INSULIN ANTIDIABETIC DRUGS: ICD-10-CM

## 2025-06-10 DIAGNOSIS — G31.84 MILD COGNITIVE IMPAIRMENT OF UNCERTAIN OR UNKNOWN ETIOLOGY: ICD-10-CM

## 2025-06-10 DIAGNOSIS — E03.9 HYPOTHYROIDISM, UNSPECIFIED: ICD-10-CM

## 2025-06-23 ENCOUNTER — APPOINTMENT (OUTPATIENT)
Dept: NEUROLOGY | Facility: CLINIC | Age: 75
End: 2025-06-23
Payer: MEDICARE

## 2025-06-23 VITALS
HEART RATE: 105 BPM | RESPIRATION RATE: 16 BRPM | WEIGHT: 265 LBS | HEIGHT: 71 IN | BODY MASS INDEX: 37.1 KG/M2 | DIASTOLIC BLOOD PRESSURE: 68 MMHG | SYSTOLIC BLOOD PRESSURE: 114 MMHG

## 2025-06-23 PROBLEM — G43.719 INTRACTABLE CHRONIC MIGRAINE WITHOUT AURA AND WITHOUT STATUS MIGRAINOSUS: Status: ACTIVE | Noted: 2025-06-23

## 2025-06-23 PROCEDURE — G2211 COMPLEX E/M VISIT ADD ON: CPT

## 2025-06-23 PROCEDURE — 99214 OFFICE O/P EST MOD 30 MIN: CPT

## 2025-06-23 RX ORDER — FREMANEZUMAB-VFRM 225 MG/1.5ML
225 INJECTION SUBCUTANEOUS
Qty: 1 | Refills: 3 | Status: ACTIVE | COMMUNITY
Start: 2025-06-23 | End: 1900-01-01

## 2025-06-23 RX ORDER — FREMANEZUMAB-VFRM 225 MG/1.5ML
225 INJECTION SUBCUTANEOUS
Qty: 1.5 | Refills: 1 | Status: ACTIVE | COMMUNITY
Start: 2025-06-23 | End: 1900-01-01

## 2025-06-23 RX ORDER — FREMANEZUMAB-VFRM 225 MG/1.5ML
0 INJECTION SUBCUTANEOUS
Qty: 0 | Refills: 0 | DISCHARGE
Start: 2025-06-23

## 2025-07-21 ENCOUNTER — NON-APPOINTMENT (OUTPATIENT)
Age: 75
End: 2025-07-21

## 2025-08-06 ENCOUNTER — OFFICE (OUTPATIENT)
Dept: URBAN - METROPOLITAN AREA CLINIC 104 | Facility: CLINIC | Age: 75
Setting detail: OPHTHALMOLOGY
End: 2025-08-06
Payer: MEDICARE

## 2025-08-06 DIAGNOSIS — H01.001: ICD-10-CM

## 2025-08-06 DIAGNOSIS — H16.222: ICD-10-CM

## 2025-08-06 DIAGNOSIS — H01.004: ICD-10-CM

## 2025-08-06 DIAGNOSIS — H01.002: ICD-10-CM

## 2025-08-06 PROCEDURE — 92014 COMPRE OPH EXAM EST PT 1/>: CPT | Performed by: OPTOMETRIST

## 2025-08-06 ASSESSMENT — KERATOMETRY
OS_K2POWER_DIOPTERS: 43.72
OD_K1POWER_DIOPTERS: 43.05
OS_K1POWER_DIOPTERS: 43.49
OD_K2POWER_DIOPTERS: 44.18
METHOD_AUTO_MANUAL: AUTO
OD_AXISANGLE_DEGREES: 88
OS_AXISANGLE_DEGREES: 8

## 2025-08-06 ASSESSMENT — PUNCTA - ASSESSMENT
OS_PUNCTA: 3MON PLUG COL PLUG LLL
OD_PUNCTA: 3MON PLUG COL PLUG RLL

## 2025-08-06 ASSESSMENT — REFRACTION_AUTOREFRACTION
OS_AXIS: 83
OS_SPHERE: +0.75
OD_CYLINDER: -2.25
OD_SPHERE: +2.50
OD_AXIS: 164
OS_CYLINDER: -1.25

## 2025-08-06 ASSESSMENT — LID EXAM ASSESSMENTS
OD_BLEPHARITIS: RLL RUL 2+
OS_BLEPHARITIS: LLL LUL 2+

## 2025-08-06 ASSESSMENT — LACRIMAL DUCT - ASSESSMENT
OS_LACRIMAL_DUCT: PROLONG 0.3MM
OD_LACRIMAL_DUCT: PROLONG 0.3MM

## 2025-08-06 ASSESSMENT — VISUAL ACUITY
OD_BCVA: 20/30-2
OS_BCVA: 20/30+1

## 2025-08-06 ASSESSMENT — SUPERFICIAL PUNCTATE KERATITIS (SPK)
OD_SPK: ABSENT
OS_SPK: 1+

## 2025-08-06 ASSESSMENT — CONFRONTATIONAL VISUAL FIELD TEST (CVF)
OD_FINDINGS: FULL
OS_FINDINGS: FULL

## 2025-08-20 ENCOUNTER — NON-APPOINTMENT (OUTPATIENT)
Age: 75
End: 2025-08-20

## 2025-08-25 ENCOUNTER — APPOINTMENT (OUTPATIENT)
Dept: NEUROLOGY | Facility: CLINIC | Age: 75
End: 2025-08-25
Payer: MEDICARE

## 2025-08-25 VITALS
SYSTOLIC BLOOD PRESSURE: 94 MMHG | DIASTOLIC BLOOD PRESSURE: 68 MMHG | BODY MASS INDEX: 35.7 KG/M2 | WEIGHT: 255 LBS | HEART RATE: 132 BPM | HEIGHT: 71 IN | TEMPERATURE: 97.8 F

## 2025-08-25 DIAGNOSIS — G43.909 MIGRAINE, UNSPECIFIED, NOT INTRACTABLE, W/OUT STATUS MIGRAINOSUS: ICD-10-CM

## 2025-08-25 DIAGNOSIS — R56.9 UNSPECIFIED CONVULSIONS: ICD-10-CM

## 2025-08-25 DIAGNOSIS — G31.84 MILD COGNITIVE IMPAIRMENT, SO STATED: ICD-10-CM

## 2025-08-25 PROCEDURE — 99213 OFFICE O/P EST LOW 20 MIN: CPT | Mod: 25

## 2025-08-25 PROCEDURE — 96132 NRPSYC TST EVAL PHYS/QHP 1ST: CPT | Mod: 59

## 2025-08-25 PROCEDURE — 96138 PSYCL/NRPSYC TECH 1ST: CPT | Mod: 59

## 2025-08-25 RX ORDER — DONEPEZIL HYDROCHLORIDE 5 MG/1
0 TABLET ORAL
Qty: 0 | Refills: 0 | DISCHARGE
Start: 2025-08-25

## 2025-08-25 RX ORDER — DONEPEZIL HYDROCHLORIDE 5 MG/1
5 TABLET ORAL DAILY
Qty: 30 | Refills: 2 | Status: ACTIVE | COMMUNITY
Start: 2025-08-25 | End: 1900-01-01

## 2025-08-25 RX ORDER — DEXTROAMPHETAMINE SACCHARATE, AMPHETAMINE ASPARTATE, DEXTROAMPHETAMINE SULFATE AND AMPHETAMINE SULFATE 2.5; 2.5; 2.5; 2.5 MG/1; MG/1; MG/1; MG/1
10 TABLET ORAL
Qty: 90 | Refills: 0 | Status: ACTIVE | COMMUNITY
Start: 2025-07-24

## 2025-09-04 ENCOUNTER — RESULT REVIEW (OUTPATIENT)
Age: 75
End: 2025-09-04

## 2025-09-04 ENCOUNTER — INPATIENT (INPATIENT)
Facility: HOSPITAL | Age: 75
LOS: 0 days | Discharge: HOME | DRG: 310 | End: 2025-09-05
Attending: HOSPITALIST | Admitting: STUDENT IN AN ORGANIZED HEALTH CARE EDUCATION/TRAINING PROGRAM
Payer: MEDICARE

## 2025-09-04 VITALS
SYSTOLIC BLOOD PRESSURE: 107 MMHG | TEMPERATURE: 98 F | OXYGEN SATURATION: 98 % | HEART RATE: 133 BPM | WEIGHT: 255.07 LBS | RESPIRATION RATE: 18 BRPM | DIASTOLIC BLOOD PRESSURE: 72 MMHG | HEIGHT: 71 IN

## 2025-09-04 DIAGNOSIS — Z95.828 PRESENCE OF OTHER VASCULAR IMPLANTS AND GRAFTS: Chronic | ICD-10-CM

## 2025-09-04 DIAGNOSIS — I48.92 UNSPECIFIED ATRIAL FLUTTER: ICD-10-CM

## 2025-09-04 DIAGNOSIS — Z98.890 OTHER SPECIFIED POSTPROCEDURAL STATES: Chronic | ICD-10-CM

## 2025-09-04 LAB
ALBUMIN SERPL ELPH-MCNC: 4.4 G/DL — SIGNIFICANT CHANGE UP (ref 3.3–5.2)
ALP SERPL-CCNC: 69 U/L — SIGNIFICANT CHANGE UP (ref 40–120)
ALT FLD-CCNC: 22 U/L — SIGNIFICANT CHANGE UP
ANION GAP SERPL CALC-SCNC: 13 MMOL/L — SIGNIFICANT CHANGE UP (ref 5–17)
AST SERPL-CCNC: 15 U/L — SIGNIFICANT CHANGE UP
BASOPHILS # BLD AUTO: 0.08 K/UL — SIGNIFICANT CHANGE UP (ref 0–0.2)
BASOPHILS NFR BLD AUTO: 1.1 % — SIGNIFICANT CHANGE UP (ref 0–2)
BILIRUB SERPL-MCNC: 0.4 MG/DL — SIGNIFICANT CHANGE UP (ref 0.4–2)
BUN SERPL-MCNC: 25.9 MG/DL — HIGH (ref 8–20)
CALCIUM SERPL-MCNC: 9.8 MG/DL — SIGNIFICANT CHANGE UP (ref 8.4–10.5)
CHLORIDE SERPL-SCNC: 104 MMOL/L — SIGNIFICANT CHANGE UP (ref 96–108)
CK SERPL-CCNC: 58 U/L — SIGNIFICANT CHANGE UP (ref 30–200)
CO2 SERPL-SCNC: 23 MMOL/L — SIGNIFICANT CHANGE UP (ref 22–29)
CREAT SERPL-MCNC: 1.6 MG/DL — HIGH (ref 0.5–1.3)
EGFR: 45 ML/MIN/1.73M2 — LOW
EGFR: 45 ML/MIN/1.73M2 — LOW
EOSINOPHIL # BLD AUTO: 0.18 K/UL — SIGNIFICANT CHANGE UP (ref 0–0.5)
EOSINOPHIL NFR BLD AUTO: 2.5 % — SIGNIFICANT CHANGE UP (ref 0–6)
GLUCOSE BLDC GLUCOMTR-MCNC: 148 MG/DL — HIGH (ref 70–99)
GLUCOSE BLDC GLUCOMTR-MCNC: 60 MG/DL — LOW (ref 70–99)
GLUCOSE BLDC GLUCOMTR-MCNC: 90 MG/DL — SIGNIFICANT CHANGE UP (ref 70–99)
GLUCOSE SERPL-MCNC: 69 MG/DL — LOW (ref 70–99)
HCT VFR BLD CALC: 49.2 % — SIGNIFICANT CHANGE UP (ref 39–50)
HGB BLD-MCNC: 15.5 G/DL — SIGNIFICANT CHANGE UP (ref 13–17)
IMM GRANULOCYTES # BLD AUTO: 0.1 K/UL — HIGH (ref 0–0.07)
IMM GRANULOCYTES NFR BLD AUTO: 1.4 % — HIGH (ref 0–0.9)
LYMPHOCYTES # BLD AUTO: 2.08 K/UL — SIGNIFICANT CHANGE UP (ref 1–3.3)
LYMPHOCYTES NFR BLD AUTO: 29.2 % — SIGNIFICANT CHANGE UP (ref 13–44)
MCHC RBC-ENTMCNC: 30.7 PG — SIGNIFICANT CHANGE UP (ref 27–34)
MCHC RBC-ENTMCNC: 31.5 G/DL — LOW (ref 32–36)
MCV RBC AUTO: 97.4 FL — SIGNIFICANT CHANGE UP (ref 80–100)
MONOCYTES # BLD AUTO: 0.64 K/UL — SIGNIFICANT CHANGE UP (ref 0–0.9)
MONOCYTES NFR BLD AUTO: 9 % — SIGNIFICANT CHANGE UP (ref 2–14)
NEUTROPHILS # BLD AUTO: 4.04 K/UL — SIGNIFICANT CHANGE UP (ref 1.8–7.4)
NEUTROPHILS NFR BLD AUTO: 56.8 % — SIGNIFICANT CHANGE UP (ref 43–77)
NRBC # BLD AUTO: 0 K/UL — SIGNIFICANT CHANGE UP (ref 0–0)
NRBC # FLD: 0 K/UL — SIGNIFICANT CHANGE UP (ref 0–0)
NRBC BLD AUTO-RTO: 0 /100 WBCS — SIGNIFICANT CHANGE UP (ref 0–0)
NT-PROBNP SERPL-SCNC: 7008 PG/ML — HIGH (ref 0–300)
PLATELET # BLD AUTO: 164 K/UL — SIGNIFICANT CHANGE UP (ref 150–400)
PMV BLD: 9.7 FL — SIGNIFICANT CHANGE UP (ref 7–13)
POTASSIUM SERPL-MCNC: 5.2 MMOL/L — SIGNIFICANT CHANGE UP (ref 3.5–5.3)
POTASSIUM SERPL-SCNC: 5.2 MMOL/L — SIGNIFICANT CHANGE UP (ref 3.5–5.3)
PROT SERPL-MCNC: 7.2 G/DL — SIGNIFICANT CHANGE UP (ref 6.6–8.7)
RBC # BLD: 5.05 M/UL — SIGNIFICANT CHANGE UP (ref 4.2–5.8)
RBC # FLD: 15.5 % — HIGH (ref 10.3–14.5)
SODIUM SERPL-SCNC: 140 MMOL/L — SIGNIFICANT CHANGE UP (ref 135–145)
T3 SERPL-MCNC: 81 NG/DL — SIGNIFICANT CHANGE UP (ref 80–200)
T4 AB SER-ACNC: 7.7 UG/DL — SIGNIFICANT CHANGE UP (ref 4.5–12)
TROPONIN T, HIGH SENSITIVITY RESULT: 27 NG/L — HIGH
TROPONIN T, HIGH SENSITIVITY RESULT: 31 NG/L — HIGH
TSH SERPL-MCNC: <0.1 UIU/ML — LOW (ref 0.27–4.2)
WBC # BLD: 7.12 K/UL — SIGNIFICANT CHANGE UP (ref 3.8–10.5)
WBC # FLD AUTO: 7.12 K/UL — SIGNIFICANT CHANGE UP (ref 3.8–10.5)

## 2025-09-04 PROCEDURE — 99223 1ST HOSP IP/OBS HIGH 75: CPT

## 2025-09-04 PROCEDURE — 82550 ASSAY OF CK (CPK): CPT

## 2025-09-04 PROCEDURE — 84443 ASSAY THYROID STIM HORMONE: CPT

## 2025-09-04 PROCEDURE — 93005 ELECTROCARDIOGRAM TRACING: CPT

## 2025-09-04 PROCEDURE — 82962 GLUCOSE BLOOD TEST: CPT

## 2025-09-04 PROCEDURE — 93010 ELECTROCARDIOGRAM REPORT: CPT | Mod: 77

## 2025-09-04 PROCEDURE — 71045 X-RAY EXAM CHEST 1 VIEW: CPT | Mod: 26

## 2025-09-04 PROCEDURE — 71045 X-RAY EXAM CHEST 1 VIEW: CPT

## 2025-09-04 PROCEDURE — 85025 COMPLETE CBC W/AUTO DIFF WBC: CPT

## 2025-09-04 PROCEDURE — 84480 ASSAY TRIIODOTHYRONINE (T3): CPT

## 2025-09-04 PROCEDURE — 83880 ASSAY OF NATRIURETIC PEPTIDE: CPT

## 2025-09-04 PROCEDURE — 80053 COMPREHEN METABOLIC PANEL: CPT

## 2025-09-04 PROCEDURE — 84436 ASSAY OF TOTAL THYROXINE: CPT

## 2025-09-04 PROCEDURE — 84484 ASSAY OF TROPONIN QUANT: CPT

## 2025-09-04 PROCEDURE — 36415 COLL VENOUS BLD VENIPUNCTURE: CPT

## 2025-09-04 PROCEDURE — 99291 CRITICAL CARE FIRST HOUR: CPT

## 2025-09-04 RX ORDER — ASPIRIN 325 MG
81 TABLET ORAL DAILY
Refills: 0 | Status: DISCONTINUED | OUTPATIENT
Start: 2025-09-05 | End: 2025-09-05

## 2025-09-04 RX ORDER — ONDANSETRON HCL/PF 4 MG/2 ML
4 VIAL (ML) INJECTION EVERY 8 HOURS
Refills: 0 | Status: DISCONTINUED | OUTPATIENT
Start: 2025-09-04 | End: 2025-09-05

## 2025-09-04 RX ORDER — INSULIN LISPRO 100 U/ML
INJECTION, SOLUTION INTRAVENOUS; SUBCUTANEOUS
Refills: 0 | Status: DISCONTINUED | OUTPATIENT
Start: 2025-09-04 | End: 2025-09-05

## 2025-09-04 RX ORDER — METOCLOPRAMIDE HCL 10 MG
5 TABLET ORAL
Refills: 0 | Status: DISCONTINUED | OUTPATIENT
Start: 2025-09-04 | End: 2025-09-05

## 2025-09-04 RX ORDER — GLUCAGON 3 MG/1
1 POWDER NASAL ONCE
Refills: 0 | Status: DISCONTINUED | OUTPATIENT
Start: 2025-09-04 | End: 2025-09-05

## 2025-09-04 RX ORDER — METOPROLOL SUCCINATE 50 MG/1
25 TABLET, EXTENDED RELEASE ORAL DAILY
Refills: 0 | Status: DISCONTINUED | OUTPATIENT
Start: 2025-09-05 | End: 2025-09-05

## 2025-09-04 RX ORDER — TAMSULOSIN HYDROCHLORIDE 0.4 MG/1
0.8 CAPSULE ORAL AT BEDTIME
Refills: 0 | Status: DISCONTINUED | OUTPATIENT
Start: 2025-09-04 | End: 2025-09-05

## 2025-09-04 RX ORDER — DEXTROSE 50 % IN WATER 50 %
25 SYRINGE (ML) INTRAVENOUS ONCE
Refills: 0 | Status: COMPLETED | OUTPATIENT
Start: 2025-09-04 | End: 2025-09-04

## 2025-09-04 RX ORDER — FERROUS SULFATE 137(45) MG
325 TABLET, EXTENDED RELEASE ORAL DAILY
Refills: 0 | Status: DISCONTINUED | OUTPATIENT
Start: 2025-09-04 | End: 2025-09-05

## 2025-09-04 RX ORDER — DEXTROSE 50 % IN WATER 50 %
25 SYRINGE (ML) INTRAVENOUS ONCE
Refills: 0 | Status: DISCONTINUED | OUTPATIENT
Start: 2025-09-04 | End: 2025-09-05

## 2025-09-04 RX ORDER — DONEPEZIL HYDROCHLORIDE 5 MG/1
5 TABLET ORAL AT BEDTIME
Refills: 0 | Status: DISCONTINUED | OUTPATIENT
Start: 2025-09-04 | End: 2025-09-05

## 2025-09-04 RX ORDER — SODIUM CHLORIDE 9 G/1000ML
1000 INJECTION, SOLUTION INTRAVENOUS
Refills: 0 | Status: DISCONTINUED | OUTPATIENT
Start: 2025-09-04 | End: 2025-09-05

## 2025-09-04 RX ORDER — SUCRALFATE 1 G
2 TABLET ORAL
Refills: 0 | Status: DISCONTINUED | OUTPATIENT
Start: 2025-09-04 | End: 2025-09-05

## 2025-09-04 RX ORDER — INSULIN GLARGINE-YFGN 100 [IU]/ML
25 INJECTION, SOLUTION SUBCUTANEOUS AT BEDTIME
Refills: 0 | Status: DISCONTINUED | OUTPATIENT
Start: 2025-09-04 | End: 2025-09-04

## 2025-09-04 RX ORDER — ATORVASTATIN CALCIUM 80 MG/1
40 TABLET, FILM COATED ORAL AT BEDTIME
Refills: 0 | Status: DISCONTINUED | OUTPATIENT
Start: 2025-09-04 | End: 2025-09-05

## 2025-09-04 RX ORDER — RIVAROXABAN 10 MG/1
20 TABLET, FILM COATED ORAL
Refills: 0 | Status: DISCONTINUED | OUTPATIENT
Start: 2025-09-04 | End: 2025-09-05

## 2025-09-04 RX ORDER — LEVOTHYROXINE SODIUM 300 MCG
150 TABLET ORAL DAILY
Refills: 0 | Status: DISCONTINUED | OUTPATIENT
Start: 2025-09-05 | End: 2025-09-05

## 2025-09-04 RX ORDER — DESMOPRESSIN ACETATE 4 UG/ML
0.1 INJECTION INTRAVENOUS
Refills: 0 | Status: DISCONTINUED | OUTPATIENT
Start: 2025-09-04 | End: 2025-09-05

## 2025-09-04 RX ORDER — DEXTROAMPHETAMINE SACCHARATE, AMPHETAMINE ASPARTATE MONOHYDRATE, DEXTROAMPHETAMINE SULFATE AND AMPHETAMINE SULFATE 2.5; 2.5; 2.5; 2.5 MG/1; MG/1; MG/1; MG/1
5 CAPSULE, EXTENDED RELEASE ORAL DAILY
Refills: 0 | Status: DISCONTINUED | OUTPATIENT
Start: 2025-09-05 | End: 2025-09-05

## 2025-09-04 RX ORDER — FUROSEMIDE 10 MG/ML
20 INJECTION INTRAMUSCULAR; INTRAVENOUS DAILY
Refills: 0 | Status: DISCONTINUED | OUTPATIENT
Start: 2025-09-05 | End: 2025-09-05

## 2025-09-04 RX ORDER — DEXTROSE 50 % IN WATER 50 %
15 SYRINGE (ML) INTRAVENOUS ONCE
Refills: 0 | Status: DISCONTINUED | OUTPATIENT
Start: 2025-09-04 | End: 2025-09-05

## 2025-09-04 RX ORDER — DEXTROSE 50 % IN WATER 50 %
12.5 SYRINGE (ML) INTRAVENOUS ONCE
Refills: 0 | Status: DISCONTINUED | OUTPATIENT
Start: 2025-09-04 | End: 2025-09-05

## 2025-09-04 RX ORDER — METOCLOPRAMIDE HCL 10 MG
1 TABLET ORAL
Refills: 0 | DISCHARGE

## 2025-09-04 RX ORDER — VENLAFAXINE HYDROCHLORIDE 37.5 MG/1
150 CAPSULE, EXTENDED RELEASE ORAL DAILY
Refills: 0 | Status: DISCONTINUED | OUTPATIENT
Start: 2025-09-05 | End: 2025-09-05

## 2025-09-04 RX ORDER — HYDROCORTISONE 20 MG
10 TABLET ORAL AT BEDTIME
Refills: 0 | Status: DISCONTINUED | OUTPATIENT
Start: 2025-09-04 | End: 2025-09-05

## 2025-09-04 RX ORDER — INSULIN GLARGINE-YFGN 100 [IU]/ML
20 INJECTION, SOLUTION SUBCUTANEOUS AT BEDTIME
Refills: 0 | Status: DISCONTINUED | OUTPATIENT
Start: 2025-09-04 | End: 2025-09-05

## 2025-09-04 RX ORDER — METOPROLOL SUCCINATE 50 MG/1
5 TABLET, EXTENDED RELEASE ORAL ONCE
Refills: 0 | Status: COMPLETED | OUTPATIENT
Start: 2025-09-04 | End: 2025-09-04

## 2025-09-04 RX ORDER — TOPIRAMATE 25 MG/1
100 TABLET, FILM COATED ORAL
Refills: 0 | Status: DISCONTINUED | OUTPATIENT
Start: 2025-09-04 | End: 2025-09-05

## 2025-09-04 RX ADMIN — Medication 25 GRAM(S): at 12:58

## 2025-09-04 RX ADMIN — RIVAROXABAN 20 MILLIGRAM(S): 10 TABLET, FILM COATED ORAL at 16:57

## 2025-09-04 RX ADMIN — DESMOPRESSIN ACETATE 0.1 MILLIGRAM(S): 4 INJECTION INTRAVENOUS at 17:46

## 2025-09-04 RX ADMIN — Medication 5 MILLIGRAM(S): at 17:46

## 2025-09-04 RX ADMIN — METOPROLOL SUCCINATE 5 MILLIGRAM(S): 50 TABLET, EXTENDED RELEASE ORAL at 09:54

## 2025-09-04 RX ADMIN — TOPIRAMATE 100 MILLIGRAM(S): 25 TABLET, FILM COATED ORAL at 17:46

## 2025-09-04 RX ADMIN — Medication 40 MILLIGRAM(S): at 17:52

## 2025-09-04 RX ADMIN — Medication 2 GRAM(S): at 17:47

## 2025-09-05 ENCOUNTER — TRANSCRIPTION ENCOUNTER (OUTPATIENT)
Age: 75
End: 2025-09-05

## 2025-09-05 VITALS
OXYGEN SATURATION: 95 % | TEMPERATURE: 98 F | DIASTOLIC BLOOD PRESSURE: 71 MMHG | HEART RATE: 75 BPM | RESPIRATION RATE: 18 BRPM | SYSTOLIC BLOOD PRESSURE: 110 MMHG

## 2025-09-05 LAB
ALBUMIN SERPL ELPH-MCNC: 3.6 G/DL — SIGNIFICANT CHANGE UP (ref 3.3–5.2)
ALP SERPL-CCNC: 57 U/L — SIGNIFICANT CHANGE UP (ref 40–120)
ALT FLD-CCNC: 17 U/L — SIGNIFICANT CHANGE UP
ANION GAP SERPL CALC-SCNC: 15 MMOL/L — SIGNIFICANT CHANGE UP (ref 5–17)
AST SERPL-CCNC: 12 U/L — SIGNIFICANT CHANGE UP
BASOPHILS # BLD AUTO: 0.05 K/UL — SIGNIFICANT CHANGE UP (ref 0–0.2)
BASOPHILS NFR BLD AUTO: 0.7 % — SIGNIFICANT CHANGE UP (ref 0–2)
BILIRUB SERPL-MCNC: 0.4 MG/DL — SIGNIFICANT CHANGE UP (ref 0.4–2)
BUN SERPL-MCNC: 33.5 MG/DL — HIGH (ref 8–20)
CALCIUM SERPL-MCNC: 8.6 MG/DL — SIGNIFICANT CHANGE UP (ref 8.4–10.5)
CHLORIDE SERPL-SCNC: 103 MMOL/L — SIGNIFICANT CHANGE UP (ref 96–108)
CO2 SERPL-SCNC: 21 MMOL/L — LOW (ref 22–29)
CREAT SERPL-MCNC: 1.57 MG/DL — HIGH (ref 0.5–1.3)
EGFR: 46 ML/MIN/1.73M2 — LOW
EGFR: 46 ML/MIN/1.73M2 — LOW
EOSINOPHIL # BLD AUTO: 0.11 K/UL — SIGNIFICANT CHANGE UP (ref 0–0.5)
EOSINOPHIL NFR BLD AUTO: 1.6 % — SIGNIFICANT CHANGE UP (ref 0–6)
GLUCOSE BLDC GLUCOMTR-MCNC: 147 MG/DL — HIGH (ref 70–99)
GLUCOSE BLDC GLUCOMTR-MCNC: 72 MG/DL — SIGNIFICANT CHANGE UP (ref 70–99)
GLUCOSE BLDC GLUCOMTR-MCNC: 87 MG/DL — SIGNIFICANT CHANGE UP (ref 70–99)
GLUCOSE BLDC GLUCOMTR-MCNC: 97 MG/DL — SIGNIFICANT CHANGE UP (ref 70–99)
GLUCOSE SERPL-MCNC: 69 MG/DL — LOW (ref 70–99)
HCT VFR BLD CALC: 46.2 % — SIGNIFICANT CHANGE UP (ref 39–50)
HGB BLD-MCNC: 14.2 G/DL — SIGNIFICANT CHANGE UP (ref 13–17)
IMM GRANULOCYTES # BLD AUTO: 0.08 K/UL — HIGH (ref 0–0.07)
IMM GRANULOCYTES NFR BLD AUTO: 1.2 % — HIGH (ref 0–0.9)
LYMPHOCYTES # BLD AUTO: 1.76 K/UL — SIGNIFICANT CHANGE UP (ref 1–3.3)
LYMPHOCYTES NFR BLD AUTO: 25.5 % — SIGNIFICANT CHANGE UP (ref 13–44)
MAGNESIUM SERPL-MCNC: 2 MG/DL — SIGNIFICANT CHANGE UP (ref 1.6–2.6)
MCHC RBC-ENTMCNC: 30.4 PG — SIGNIFICANT CHANGE UP (ref 27–34)
MCHC RBC-ENTMCNC: 30.7 G/DL — LOW (ref 32–36)
MCV RBC AUTO: 98.9 FL — SIGNIFICANT CHANGE UP (ref 80–100)
MONOCYTES # BLD AUTO: 0.68 K/UL — SIGNIFICANT CHANGE UP (ref 0–0.9)
MONOCYTES NFR BLD AUTO: 9.8 % — SIGNIFICANT CHANGE UP (ref 2–14)
NEUTROPHILS # BLD AUTO: 4.23 K/UL — SIGNIFICANT CHANGE UP (ref 1.8–7.4)
NEUTROPHILS NFR BLD AUTO: 61.2 % — SIGNIFICANT CHANGE UP (ref 43–77)
NRBC # BLD AUTO: 0 K/UL — SIGNIFICANT CHANGE UP (ref 0–0)
NRBC # FLD: 0 K/UL — SIGNIFICANT CHANGE UP (ref 0–0)
NRBC BLD AUTO-RTO: 0 /100 WBCS — SIGNIFICANT CHANGE UP (ref 0–0)
PHOSPHATE SERPL-MCNC: 4 MG/DL — SIGNIFICANT CHANGE UP (ref 2.4–4.7)
PLATELET # BLD AUTO: 130 K/UL — LOW (ref 150–400)
PMV BLD: 9.8 FL — SIGNIFICANT CHANGE UP (ref 7–13)
POTASSIUM SERPL-MCNC: 5 MMOL/L — SIGNIFICANT CHANGE UP (ref 3.5–5.3)
POTASSIUM SERPL-SCNC: 5 MMOL/L — SIGNIFICANT CHANGE UP (ref 3.5–5.3)
PROT SERPL-MCNC: 6.1 G/DL — LOW (ref 6.6–8.7)
RBC # BLD: 4.67 M/UL — SIGNIFICANT CHANGE UP (ref 4.2–5.8)
RBC # FLD: 15.9 % — HIGH (ref 10.3–14.5)
SODIUM SERPL-SCNC: 138 MMOL/L — SIGNIFICANT CHANGE UP (ref 135–145)
WBC # BLD: 6.91 K/UL — SIGNIFICANT CHANGE UP (ref 3.8–10.5)
WBC # FLD AUTO: 6.91 K/UL — SIGNIFICANT CHANGE UP (ref 3.8–10.5)

## 2025-09-05 PROCEDURE — 93312 ECHO TRANSESOPHAGEAL: CPT

## 2025-09-05 PROCEDURE — 71045 X-RAY EXAM CHEST 1 VIEW: CPT

## 2025-09-05 PROCEDURE — 85025 COMPLETE CBC W/AUTO DIFF WBC: CPT

## 2025-09-05 PROCEDURE — 93005 ELECTROCARDIOGRAM TRACING: CPT

## 2025-09-05 PROCEDURE — 93320 DOPPLER ECHO COMPLETE: CPT

## 2025-09-05 PROCEDURE — 82962 GLUCOSE BLOOD TEST: CPT

## 2025-09-05 PROCEDURE — 84100 ASSAY OF PHOSPHORUS: CPT

## 2025-09-05 PROCEDURE — 80053 COMPREHEN METABOLIC PANEL: CPT

## 2025-09-05 PROCEDURE — 83880 ASSAY OF NATRIURETIC PEPTIDE: CPT

## 2025-09-05 PROCEDURE — 84480 ASSAY TRIIODOTHYRONINE (T3): CPT

## 2025-09-05 PROCEDURE — 36415 COLL VENOUS BLD VENIPUNCTURE: CPT

## 2025-09-05 PROCEDURE — 84484 ASSAY OF TROPONIN QUANT: CPT

## 2025-09-05 PROCEDURE — 99291 CRITICAL CARE FIRST HOUR: CPT | Mod: 25

## 2025-09-05 PROCEDURE — 84436 ASSAY OF TOTAL THYROXINE: CPT

## 2025-09-05 PROCEDURE — 84443 ASSAY THYROID STIM HORMONE: CPT

## 2025-09-05 PROCEDURE — 82550 ASSAY OF CK (CPK): CPT

## 2025-09-05 PROCEDURE — 99239 HOSP IP/OBS DSCHRG MGMT >30: CPT

## 2025-09-05 PROCEDURE — 93325 DOPPLER ECHO COLOR FLOW MAPG: CPT

## 2025-09-05 PROCEDURE — 83735 ASSAY OF MAGNESIUM: CPT

## 2025-09-05 PROCEDURE — 96374 THER/PROPH/DIAG INJ IV PUSH: CPT

## 2025-09-05 RX ORDER — DAPAGLIFLOZIN 5 MG/1
1 TABLET, FILM COATED ORAL
Qty: 30 | Refills: 0
Start: 2025-09-05 | End: 2025-10-04

## 2025-09-05 RX ORDER — FUROSEMIDE 10 MG/ML
1 INJECTION INTRAMUSCULAR; INTRAVENOUS
Qty: 0 | Refills: 0 | DISCHARGE
Start: 2025-09-05

## 2025-09-05 RX ORDER — DEXTROAMPHETAMINE SACCHARATE, AMPHETAMINE ASPARTATE MONOHYDRATE, DEXTROAMPHETAMINE SULFATE AND AMPHETAMINE SULFATE 2.5; 2.5; 2.5; 2.5 MG/1; MG/1; MG/1; MG/1
1 CAPSULE, EXTENDED RELEASE ORAL
Refills: 0 | DISCHARGE

## 2025-09-05 RX ORDER — NYSTATIN 100000 [USP'U]/G
1 CREAM TOPICAL
Refills: 0 | Status: DISCONTINUED | OUTPATIENT
Start: 2025-09-05 | End: 2025-09-05

## 2025-09-05 RX ORDER — METOPROLOL SUCCINATE 50 MG/1
25 TABLET, EXTENDED RELEASE ORAL DAILY
Refills: 0 | Status: DISCONTINUED | OUTPATIENT
Start: 2025-09-05 | End: 2025-09-05

## 2025-09-05 RX ORDER — METOPROLOL SUCCINATE 50 MG/1
1 TABLET, EXTENDED RELEASE ORAL
Qty: 0 | Refills: 0 | DISCHARGE
Start: 2025-09-05

## 2025-09-05 RX ORDER — DILTIAZEM HYDROCHLORIDE 240 MG/1
1 TABLET, EXTENDED RELEASE ORAL
Refills: 0 | DISCHARGE

## 2025-09-05 RX ORDER — FUROSEMIDE 10 MG/ML
1 INJECTION INTRAMUSCULAR; INTRAVENOUS
Refills: 0 | DISCHARGE

## 2025-09-05 RX ORDER — DAPAGLIFLOZIN 5 MG/1
10 TABLET, FILM COATED ORAL DAILY
Refills: 0 | Status: DISCONTINUED | OUTPATIENT
Start: 2025-09-05 | End: 2025-09-05

## 2025-09-05 RX ADMIN — Medication 5 MILLIGRAM(S): at 06:36

## 2025-09-05 RX ADMIN — DAPAGLIFLOZIN 10 MILLIGRAM(S): 5 TABLET, FILM COATED ORAL at 17:36

## 2025-09-05 RX ADMIN — METOPROLOL SUCCINATE 25 MILLIGRAM(S): 50 TABLET, EXTENDED RELEASE ORAL at 06:36

## 2025-09-05 RX ADMIN — Medication 40 MILLIGRAM(S): at 06:35

## 2025-09-05 RX ADMIN — Medication 40 MILLIGRAM(S): at 00:02

## 2025-09-05 RX ADMIN — Medication 81 MILLIGRAM(S): at 08:39

## 2025-09-05 RX ADMIN — DESMOPRESSIN ACETATE 0.1 MILLIGRAM(S): 4 INJECTION INTRAVENOUS at 17:37

## 2025-09-05 RX ADMIN — VENLAFAXINE HYDROCHLORIDE 150 MILLIGRAM(S): 37.5 CAPSULE, EXTENDED RELEASE ORAL at 08:39

## 2025-09-05 RX ADMIN — DONEPEZIL HYDROCHLORIDE 5 MILLIGRAM(S): 5 TABLET ORAL at 00:01

## 2025-09-05 RX ADMIN — TOPIRAMATE 100 MILLIGRAM(S): 25 TABLET, FILM COATED ORAL at 17:36

## 2025-09-05 RX ADMIN — Medication 2 GRAM(S): at 06:35

## 2025-09-05 RX ADMIN — Medication 150 MICROGRAM(S): at 06:36

## 2025-09-05 RX ADMIN — Medication 500 MILLIGRAM(S): at 00:01

## 2025-09-05 RX ADMIN — Medication 325 MILLIGRAM(S): at 08:39

## 2025-09-05 RX ADMIN — NYSTATIN 1 APPLICATION(S): 100000 CREAM TOPICAL at 06:41

## 2025-09-05 RX ADMIN — TAMSULOSIN HYDROCHLORIDE 0.8 MILLIGRAM(S): 0.4 CAPSULE ORAL at 00:01

## 2025-09-05 RX ADMIN — Medication 40 MILLIGRAM(S): at 17:37

## 2025-09-05 RX ADMIN — Medication 2 GRAM(S): at 17:37

## 2025-09-05 RX ADMIN — RIVAROXABAN 20 MILLIGRAM(S): 10 TABLET, FILM COATED ORAL at 17:37

## 2025-09-05 RX ADMIN — NYSTATIN 1 APPLICATION(S): 100000 CREAM TOPICAL at 17:37

## 2025-09-05 RX ADMIN — Medication 5 MILLIGRAM(S): at 17:37

## 2025-09-05 RX ADMIN — TOPIRAMATE 100 MILLIGRAM(S): 25 TABLET, FILM COATED ORAL at 06:35

## 2025-09-05 RX ADMIN — FUROSEMIDE 20 MILLIGRAM(S): 10 INJECTION INTRAMUSCULAR; INTRAVENOUS at 06:35

## 2025-09-05 RX ADMIN — ATORVASTATIN CALCIUM 40 MILLIGRAM(S): 80 TABLET, FILM COATED ORAL at 00:02

## 2025-09-05 RX ADMIN — Medication 10 MILLIGRAM(S): at 00:01

## 2025-09-05 RX ADMIN — DESMOPRESSIN ACETATE 0.1 MILLIGRAM(S): 4 INJECTION INTRAVENOUS at 06:36

## 2025-09-05 RX ADMIN — DEXTROAMPHETAMINE SACCHARATE, AMPHETAMINE ASPARTATE MONOHYDRATE, DEXTROAMPHETAMINE SULFATE AND AMPHETAMINE SULFATE 5 MILLIGRAM(S): 2.5; 2.5; 2.5; 2.5 CAPSULE, EXTENDED RELEASE ORAL at 11:30

## (undated) DEVICE — PACK IV START WITH CHG

## (undated) DEVICE — CAP TRANSPARENT DISTAL ATTACHMENT DISP

## (undated) DEVICE — FOLEY HOLDER STATLOCK 2 WAY ADULT

## (undated) DEVICE — SUCTION YANKAUER NO CONTROL VENT

## (undated) DEVICE — SYR ALLIANCE II INFLATION 60ML

## (undated) DEVICE — SOL INJ NS 0.9% 500ML 2 PORT

## (undated) DEVICE — FORCEP RADIAL JAW 4 JUMBO 2.8MM 3.2MM 240CM ORANGE DISP

## (undated) DEVICE — IRRIGATOR BIO SHIELD

## (undated) DEVICE — BIOPSY FORCEP RADIAL JAW 4 STANDARD WITH NEEDLE

## (undated) DEVICE — CATH IV SAFE BC 20G X 1.16" (PINK)

## (undated) DEVICE — CAP HALO SMALL

## (undated) DEVICE — SENSOR O2 FINGER ADULT

## (undated) DEVICE — TUBING SUCTION CONN 6FT STERILE

## (undated) DEVICE — SYR LUER LOK 50CC

## (undated) DEVICE — BITE BLOCK ADULT 20 X 27MM (GREEN)

## (undated) DEVICE — CATH IV SAFE BC 22G X 1" (BLUE)

## (undated) DEVICE — CLAMP BX HOT RAD JAW 3

## (undated) DEVICE — TUBING IV SET GRAVITY 3Y 100" MACRO

## (undated) DEVICE — SENSOR O2 FINGER ADULT 24/CA

## (undated) DEVICE — BALLOON US ENDO

## (undated) DEVICE — FORCEP MULTIBITE MULTIPLE SAMPLE 2.4MM 2.8MM 240CM ORANGE DISP

## (undated) DEVICE — BRUSH COLONOSCOPY CYTOLOGY

## (undated) DEVICE — TUBING SUCTION 20FT